# Patient Record
Sex: FEMALE | Race: WHITE | NOT HISPANIC OR LATINO | Employment: OTHER | URBAN - NONMETROPOLITAN AREA
[De-identification: names, ages, dates, MRNs, and addresses within clinical notes are randomized per-mention and may not be internally consistent; named-entity substitution may affect disease eponyms.]

---

## 2017-09-12 ENCOUNTER — OFFICE VISIT (OUTPATIENT)
Dept: GASTROENTEROLOGY | Facility: CLINIC | Age: 67
End: 2017-09-12

## 2017-09-12 VITALS
HEART RATE: 68 BPM | BODY MASS INDEX: 25.27 KG/M2 | SYSTOLIC BLOOD PRESSURE: 122 MMHG | WEIGHT: 148 LBS | DIASTOLIC BLOOD PRESSURE: 80 MMHG | OXYGEN SATURATION: 99 % | HEIGHT: 64 IN

## 2017-09-12 DIAGNOSIS — Z80.0 FAMILY HX OF COLON CANCER: ICD-10-CM

## 2017-09-12 DIAGNOSIS — R13.10 DIFFICULTY SWALLOWING SOLIDS: ICD-10-CM

## 2017-09-12 DIAGNOSIS — Z86.010 HX OF ADENOMATOUS COLONIC POLYPS: Primary | ICD-10-CM

## 2017-09-12 PROBLEM — Z86.0101 HX OF ADENOMATOUS COLONIC POLYPS: Status: ACTIVE | Noted: 2017-09-12

## 2017-09-12 PROCEDURE — 99214 OFFICE O/P EST MOD 30 MIN: CPT | Performed by: CLINICAL NURSE SPECIALIST

## 2017-09-12 RX ORDER — SODIUM, POTASSIUM,MAG SULFATES 17.5-3.13G
SOLUTION, RECONSTITUTED, ORAL ORAL
Qty: 2 BOTTLE | Refills: 0 | Status: SHIPPED | OUTPATIENT
Start: 2017-09-12 | End: 2018-06-20

## 2017-09-12 NOTE — PROGRESS NOTES
Liudmila Ga  1950 9/12/2017  Chief Complaint   Patient presents with   • Colonoscopy     Subjective   HPI  Liudmila Ga is a 66 y.o. female who presents as a referral for preventative maintenance. She has no complaints of nausea or vomiting. No change in bowels. No wt loss. No BRBPR. No melena. There is a negative family hx for colon cancer. No abdominal pain.    She also has recurrent difficulty swallowing It is with any solid foods. It is located in the mid esophageal region. It occurs daily when she eats. She denies any nausea or vomiting. No wt loss. No melena. She has had dilatation in the past in 2015 noted below.   Past Medical History:   Diagnosis Date   • Colon polyps    • GERD (gastroesophageal reflux disease)    • Hiatal hernia    • Osteopenia    • Overactive bladder    • Vitamin D deficiency      Past Surgical History:   Procedure Laterality Date   • CHOLECYSTECTOMY  2004   • COLONOSCOPY  08/20/2014    HYPERPLASTIC POLYP ILEOCECAL VALVE AND HEPATIC FLEXURE, DIVERTICULA IN SIGMOID COLON   • COLONOSCOPY  02/04/2014    TUBULAR ADENOMA CECAL POLYP, TUBULAR ADENOMA RECTAL POLYP, HYPERPLASTICS POLYPS AT ILEOCECAL VALVE AND RECTAL.   • ENDOSCOPY  12/22/2015    HIATAL HERNIA, MILD SCHATZKI RING DILATED   • HYSTERECTOMY  06/2011    TOTAL   • TONSILLECTOMY       Outpatient Prescriptions Marked as Taking for the 9/12/17 encounter (Office Visit) with ALTHEA Valdivia   Medication Sig Dispense Refill   • atenolol (TENORMIN) 25 MG tablet Take 25 mg by mouth Daily.     • DULoxetine (CYMBALTA) 30 MG capsule Take 30 mg by mouth Daily.     • fluticasone (FLONASE) 50 MCG/ACT nasal spray 2 sprays into each nostril Daily.     • zolpidem (AMBIEN) 10 MG tablet Take 10 mg by mouth At Night As Needed for Sleep.       Allergies   Allergen Reactions   • Lortab [Hydrocodone-Acetaminophen]    • Sulfa Antibiotics      Social History     Social History   • Marital status:      Spouse name: N/A   •  "Number of children: N/A   • Years of education: N/A     Occupational History   • Not on file.     Social History Main Topics   • Smoking status: Former Smoker   • Smokeless tobacco: Never Used   • Alcohol use Yes      Comment: RARE   • Drug use: Not on file   • Sexual activity: Not on file     Other Topics Concern   • Not on file     Social History Narrative     Family History   Problem Relation Age of Onset   • Liver cancer Mother    • Colon cancer Brother    • Colon polyps Brother      Health Maintenance   Topic Date Due   • TDAP/TD VACCINES (1 - Tdap) 12/02/1967   • PNEUMOCOCCAL VACCINES (65+ LOW/MEDIUM RISK) (1 of 2 - PCV13) 12/02/2013   • INFLUENZA VACCINE  08/01/2017   • HEPATITIS C SCREENING  08/30/2017   • MEDICARE ANNUAL WELLNESS  08/30/2017   • MAMMOGRAM  08/30/2017   • ZOSTER VACCINE  08/30/2017   • DXA SCAN  09/11/2017   • COLONOSCOPY  08/20/2024       REVIEW OF SYSTEMS  General: well appearing, no fever chills or sweats, no unexplained wt loss  HEENT: no acute visual or hearing disturbances  Cardiovascular: No chest pain or palpitations  Pulmonary: No shortness of breath, coughing, wheezing or hemoptysis  : No burning, urgency, hematuria, or dysuria  Musculoskeletal: No joint pain or stiffness  Peripheral: no edema  Skin: No lesions or rashes  Neuro: No dizziness, headaches, stroke, syncope  Endocrine: No hot or cold intolerances  Hematological: No blood dyscrasias    Objective   Vitals:    09/12/17 1406   BP: 122/80   Pulse: 68   SpO2: 99%   Weight: 148 lb (67.1 kg)   Height: 64\" (162.6 cm)     Body mass index is 25.4 kg/(m^2).    PHYSICAL EXAM  General: age appropriate well nourished well appearing, no acute distress  Head: normocephalic and atraumatic  Global assessment-supple  Neck-No JVD noted, no lymphadenopathy  Pulmonary-clear to auscultation bilaterally, normal respiratory effort  Cardiovascular-normal rate and rhythm, normal heart sounds, S1 and S2 noted  Abdomen-soft, non tender, non " distended, normal bowel sounds all 4 quadrants, no hepatosplenomegaly noted  Extremities-No clubbing cyanosis or edema  Neuro-Non focal, converses appropriately, awake, alert, oriented    Assessment/Plan     Liudmila was seen today for colonoscopy.    Diagnoses and all orders for this visit:    Hx of adenomatous colonic polyps  Comments:  2/2014 as noted in surgical hx.  Orders:  -     SUPREP BOWEL PREP KIT 17.5-3.13-1.6 GM/180ML solution oral solution; Take as directed by office instructions provided  -     Case Request; Standing  -     Implement Anesthesia Orders Day of Procedure; Standing  -     Obtain Informed Consent; Standing  -     Verify bowel prep was successful; Standing  -     Case Request    Difficulty swallowing solids  -     Case Request; Standing  -     Implement Anesthesia Orders Day of Procedure; Standing  -     Obtain Informed Consent; Standing  -     Case Request    Family hx of colon cancer  Comments:  Brother with hx of colon cancer, in his 60s.      SHE SAYS THAT SHE HAS A DIFFICULT COLON SMALL IN SIZE AND TWISTED.     COLONOSCOPY WITH ANESTHESIA (N/A)  Body mass index is 25.4 kg/(m^2).    Patient instructions on prep prior to procedure provided to the patient.    All risks, benefits, alternatives, and indications of colonoscopy procedure have been discussed with the patient. Risks to include perforation of the colon requiring possible surgery or colostomy, risk of bleeding from biopsies or removal of colon tissue, possibility of missing a colon polyp or cancer, or adverse drug reaction.  Benefits to include the diagnosis and management of disease of the colon and rectum. Alternatives to include barium enema, radiographic evaluation, lab testing or no intervention. Pt verbalizes understanding and agrees.

## 2017-11-07 ENCOUNTER — ANESTHESIA (OUTPATIENT)
Dept: GASTROENTEROLOGY | Facility: HOSPITAL | Age: 67
End: 2017-11-07

## 2017-11-07 ENCOUNTER — HOSPITAL ENCOUNTER (OUTPATIENT)
Facility: HOSPITAL | Age: 67
Setting detail: HOSPITAL OUTPATIENT SURGERY
Discharge: HOME | End: 2017-11-07
Attending: INTERNAL MEDICINE | Admitting: INTERNAL MEDICINE

## 2017-11-07 ENCOUNTER — ANESTHESIA EVENT (OUTPATIENT)
Dept: GASTROENTEROLOGY | Facility: HOSPITAL | Age: 67
End: 2017-11-07

## 2017-11-07 VITALS
RESPIRATION RATE: 15 BRPM | TEMPERATURE: 97.7 F | BODY MASS INDEX: 26.29 KG/M2 | OXYGEN SATURATION: 97 % | HEART RATE: 69 BPM | WEIGHT: 154 LBS | SYSTOLIC BLOOD PRESSURE: 112 MMHG | HEIGHT: 64 IN | DIASTOLIC BLOOD PRESSURE: 59 MMHG

## 2017-11-07 DIAGNOSIS — R13.10 DIFFICULTY SWALLOWING SOLIDS: ICD-10-CM

## 2017-11-07 PROCEDURE — 43450 DILATE ESOPHAGUS 1/MULT PASS: CPT | Performed by: INTERNAL MEDICINE

## 2017-11-07 PROCEDURE — 25010000002 PROPOFOL 10 MG/ML EMULSION: Performed by: NURSE ANESTHETIST, CERTIFIED REGISTERED

## 2017-11-07 PROCEDURE — 43235 EGD DIAGNOSTIC BRUSH WASH: CPT | Performed by: INTERNAL MEDICINE

## 2017-11-07 RX ORDER — SODIUM CHLORIDE 9 MG/ML
500 INJECTION, SOLUTION INTRAVENOUS CONTINUOUS PRN
Status: DISCONTINUED | OUTPATIENT
Start: 2017-11-07 | End: 2017-11-07 | Stop reason: HOSPADM

## 2017-11-07 RX ORDER — SODIUM CHLORIDE 0.9 % (FLUSH) 0.9 %
3 SYRINGE (ML) INJECTION AS NEEDED
Status: DISCONTINUED | OUTPATIENT
Start: 2017-11-07 | End: 2017-11-07 | Stop reason: HOSPADM

## 2017-11-07 RX ORDER — LIDOCAINE HYDROCHLORIDE 20 MG/ML
INJECTION, SOLUTION INFILTRATION; PERINEURAL AS NEEDED
Status: DISCONTINUED | OUTPATIENT
Start: 2017-11-07 | End: 2017-11-07 | Stop reason: SURG

## 2017-11-07 RX ORDER — PROPOFOL 10 MG/ML
VIAL (ML) INTRAVENOUS AS NEEDED
Status: DISCONTINUED | OUTPATIENT
Start: 2017-11-07 | End: 2017-11-07 | Stop reason: SURG

## 2017-11-07 RX ADMIN — LIDOCAINE HYDROCHLORIDE 50 MG: 20 INJECTION, SOLUTION INFILTRATION; PERINEURAL at 09:21

## 2017-11-07 RX ADMIN — SODIUM CHLORIDE 500 ML: 9 INJECTION, SOLUTION INTRAVENOUS at 08:21

## 2017-11-07 RX ADMIN — PROPOFOL 80 MG: 10 INJECTION, EMULSION INTRAVENOUS at 09:22

## 2017-11-07 RX ADMIN — PROPOFOL 50 MG: 10 INJECTION, EMULSION INTRAVENOUS at 09:26

## 2017-11-07 NOTE — ANESTHESIA PREPROCEDURE EVALUATION
Anesthesia Evaluation     Patient summary reviewed   no history of anesthetic complications:         Airway   Mallampati: II  TM distance: <3 FB  Dental          Pulmonary    (+) a smoker (quit 01/2012) Former,   (-) COPD, asthma, sleep apnea  Cardiovascular   Exercise tolerance: good (4-7 METS)    Patient on routine beta blocker and Beta blocker given within 24 hours of surgery    (+) dysrhythmias Tachycardia,       Neuro/Psych  (-) seizures, TIA, CVA  GI/Hepatic/Renal/Endo    (+)  hiatal hernia, GERD,   (-) liver disease, no renal disease, diabetes    Musculoskeletal     Abdominal    Substance History      OB/GYN          Other                                        Anesthesia Plan    ASA 2     general   total IV anesthesia  intravenous induction   Anesthetic plan and risks discussed with patient.

## 2017-11-07 NOTE — PLAN OF CARE
Problem: Patient Care Overview (Adult)  Goal: Plan of Care Review  Outcome: Outcome(s) achieved Date Met:  11/07/17 11/07/17 0943   Coping/Psychosocial Response Interventions   Plan Of Care Reviewed With patient;spouse   Patient Care Overview   Progress improving   Outcome Evaluation   Outcome Summary/Follow up Plan discharge criteria met

## 2017-11-07 NOTE — PLAN OF CARE
Problem: Patient Care Overview (Adult)  Goal: Plan of Care Review  Outcome: Ongoing (interventions implemented as appropriate)    11/07/17 0907   Coping/Psychosocial Response Interventions   Plan Of Care Reviewed With patient   Patient Care Overview   Progress no change   Outcome Evaluation   Outcome Summary/Follow up Plan pt tolerated procedure well

## 2017-11-07 NOTE — PLAN OF CARE
Problem: GI Endoscopy (Adult)  Goal: Signs and Symptoms of Listed Potential Problems Will be Absent or Manageable (GI Endoscopy)  Outcome: Outcome(s) achieved Date Met:  11/07/17 11/07/17 0942   GI Endoscopy   Problems Assessed (GI Endoscopy) all   Problems Present (GI Endoscopy) none

## 2017-11-07 NOTE — ANESTHESIA POSTPROCEDURE EVALUATION
Patient: Liudmila Ga    Procedure Summary     Date Anesthesia Start Anesthesia Stop Room / Location    11/07/17 0918 0932 [facility] ENDOSCOPY 4 / [facility]       Procedure Diagnosis Surgeon Provider    ESOPHAGOGASTRODUODENOSCOPY WITH ANESTHESIA (N/A Esophagus) Difficulty swallowing solids  (Difficulty swallowing solids [R13.10]) MD Papo Shane CRNA          Anesthesia Type: general  Last vitals  BP   118/55 (11/07/17 0801)   Temp   97.7 °F (36.5 °C) (11/07/17 0801)   Pulse   72 (11/07/17 0801)   Resp   18 (11/07/17 0801)     SpO2   95 % (11/07/17 0801)     Post Anesthesia Care and Evaluation    Patient participation: complete - patient participated  Level of consciousness: awake  Pain score: 1  Pain management: adequate  Airway patency: patent  Anesthetic complications: No anesthetic complications  PONV Status: none  Cardiovascular status: acceptable  Respiratory status: acceptable  Hydration status: acceptable

## 2017-11-07 NOTE — H&P
"[facility] Gastroenterology  Pre Procedure History & Physical    Chief Complaint:   Dysphagia    Subjective     HPI:   Here for endoscopy.  Having dysphagia to solids and liquids.  Feels like she gets choked at times.  History of hiatal hernia and Schatzki's ring.    Past Medical History:   Past Medical History:   Diagnosis Date   • Arthritis    • Colon polyps    • GERD (gastroesophageal reflux disease)    • Hiatal hernia    • Osteopenia    • Overactive bladder    • Vitamin D deficiency        Past Surgical History:  [unfilled]    Family History:  Family History   Problem Relation Age of Onset   • Liver cancer Mother    • Colon cancer Brother    • Colon polyps Brother        Social History:   reports that she quit smoking about 5 years ago. She has never used smokeless tobacco. She reports that she drinks alcohol. She reports that she does not use illicit drugs.    Medications:   Prior to Admission medications    Medication Sig Start Date End Date Taking? Authorizing Provider   atenolol (TENORMIN) 25 MG tablet Take 25 mg by mouth Daily.   Yes Historical Provider, MD   DULoxetine (CYMBALTA) 30 MG capsule Take 30 mg by mouth Daily.   Yes Historical Provider, MD   fluticasone (FLONASE) 50 MCG/ACT nasal spray 2 sprays into each nostril Daily.   Yes Historical Provider, MD   zolpidem (AMBIEN) 10 MG tablet Take 10 mg by mouth At Night As Needed for Sleep.   Yes Historical Provider, MD   SUPREP BOWEL PREP KIT 17.5-3.13-1.6 GM/180ML solution oral solution Take as directed by office instructions provided 9/12/17   ALTHEA Valdivia       Allergies:  Lortab [hydrocodone-acetaminophen] and Sulfa antibiotics    Objective     Blood pressure 118/55, pulse 72, temperature 97.7 °F (36.5 °C), temperature source Temporal Artery , resp. rate 18, height 64\" (162.6 cm), weight 154 lb (69.9 kg), SpO2 95 %.    Physical Exam   Constitutional: Pt is oriented to person, place, and in no distress.   HENT: Mouth/Throat: Oropharynx is " clear.   Cardiovascular: Normal rate, regular rhythm.    Pulmonary/Chest: Effort normal. No respiratory distress. No  wheezes.   Abdominal: Soft. Non-distended.  Skin: Skin is warm and dry.   Psychiatric: Mood, memory, affect and judgment appear normal.     Assessment/Plan     Diagnosis:    Dysphagia    Anticipated Surgical Procedure:    Proceed with endoscopy as scheduled    The following major R/B/A were discussed with the patient, however the list is not all inclusive . Risk:  Bleeding (immediate and delayed), perforation (rupture or tear), reaction to medication, missed lesion/cancer, pain during the procedure, infection, need for surgery, need for ostomy, need for mechanical ventilation (breathing machine), death.  Benefits: removal of polyp/tissue, burn/clip/or inject to stop bleeding, removal of foreign body, dilate any stricture.  Alternatives: Xray or CT, surgery, do nothing with associated risk   The patient was given time to ask question and received explanation, and agrees to proceed as per History and Physical.   No guarantee given or expressed.    EMR Dragon/transcription disclaimer: Much of this encounter note is an electronic transcription/translation of spoken language to printed text.  The electronic translation of spoken language may permit erroneous, or at times, nonsensical words or phrases to be inadvertently transcribed.  Although I have reviewed the note for such errors, some may still exist.    Stevan Mullins MD  9:21 AM  11/7/2017

## 2017-12-11 ENCOUNTER — ANESTHESIA (OUTPATIENT)
Dept: GASTROENTEROLOGY | Facility: HOSPITAL | Age: 67
End: 2017-12-11

## 2017-12-11 ENCOUNTER — ANESTHESIA EVENT (OUTPATIENT)
Dept: GASTROENTEROLOGY | Facility: HOSPITAL | Age: 67
End: 2017-12-11

## 2017-12-11 ENCOUNTER — HOSPITAL ENCOUNTER (OUTPATIENT)
Facility: HOSPITAL | Age: 67
Setting detail: HOSPITAL OUTPATIENT SURGERY
Discharge: HOME | End: 2017-12-11
Attending: INTERNAL MEDICINE | Admitting: INTERNAL MEDICINE

## 2017-12-11 VITALS
OXYGEN SATURATION: 97 % | HEIGHT: 64 IN | HEART RATE: 63 BPM | TEMPERATURE: 98.2 F | SYSTOLIC BLOOD PRESSURE: 112 MMHG | BODY MASS INDEX: 26.29 KG/M2 | RESPIRATION RATE: 23 BRPM | DIASTOLIC BLOOD PRESSURE: 58 MMHG | WEIGHT: 154 LBS

## 2017-12-11 DIAGNOSIS — Z86.010 HX OF ADENOMATOUS COLONIC POLYPS: ICD-10-CM

## 2017-12-11 PROCEDURE — 88305 TISSUE EXAM BY PATHOLOGIST: CPT | Performed by: INTERNAL MEDICINE

## 2017-12-11 PROCEDURE — 25010000002 PROPOFOL 10 MG/ML EMULSION: Performed by: NURSE ANESTHETIST, CERTIFIED REGISTERED

## 2017-12-11 PROCEDURE — 45385 COLONOSCOPY W/LESION REMOVAL: CPT | Performed by: INTERNAL MEDICINE

## 2017-12-11 RX ORDER — SODIUM CHLORIDE 0.9 % (FLUSH) 0.9 %
3 SYRINGE (ML) INJECTION AS NEEDED
Status: DISCONTINUED | OUTPATIENT
Start: 2017-12-11 | End: 2017-12-11 | Stop reason: HOSPADM

## 2017-12-11 RX ORDER — PROPOFOL 10 MG/ML
VIAL (ML) INTRAVENOUS AS NEEDED
Status: DISCONTINUED | OUTPATIENT
Start: 2017-12-11 | End: 2017-12-11 | Stop reason: SURG

## 2017-12-11 RX ORDER — SODIUM CHLORIDE 9 MG/ML
500 INJECTION, SOLUTION INTRAVENOUS CONTINUOUS PRN
Status: DISCONTINUED | OUTPATIENT
Start: 2017-12-11 | End: 2017-12-11 | Stop reason: HOSPADM

## 2017-12-11 RX ADMIN — PROPOFOL 50 MG: 10 INJECTION, EMULSION INTRAVENOUS at 10:46

## 2017-12-11 RX ADMIN — PROPOFOL 50 MG: 10 INJECTION, EMULSION INTRAVENOUS at 10:55

## 2017-12-11 RX ADMIN — PROPOFOL 50 MG: 10 INJECTION, EMULSION INTRAVENOUS at 10:47

## 2017-12-11 RX ADMIN — PROPOFOL 50 MG: 10 INJECTION, EMULSION INTRAVENOUS at 10:45

## 2017-12-11 RX ADMIN — SODIUM CHLORIDE 500 ML: 9 INJECTION, SOLUTION INTRAVENOUS at 10:34

## 2017-12-11 RX ADMIN — PROPOFOL 50 MG: 10 INJECTION, EMULSION INTRAVENOUS at 11:03

## 2017-12-11 RX ADMIN — PROPOFOL 50 MG: 10 INJECTION, EMULSION INTRAVENOUS at 11:05

## 2017-12-11 RX ADMIN — LIDOCAINE HYDROCHLORIDE 0.5 ML: 10 INJECTION, SOLUTION EPIDURAL; INFILTRATION; INTRACAUDAL; PERINEURAL at 10:35

## 2017-12-11 NOTE — PLAN OF CARE
Problem: Patient Care Overview (Adult)  Goal: Plan of Care Review  Outcome: Ongoing (interventions implemented as appropriate)    12/11/17 1100   Coping/Psychosocial Response Interventions   Plan Of Care Reviewed With patient   Patient Care Overview   Progress no change   Outcome Evaluation   Outcome Summary/Follow up Plan tolerating well         Problem: GI Endoscopy (Adult)  Goal: Signs and Symptoms of Listed Potential Problems Will be Absent or Manageable (GI Endoscopy)  Outcome: Ongoing (interventions implemented as appropriate)

## 2017-12-11 NOTE — H&P
"[facility] Gastroenterology  Pre Procedure History & Physical    Chief Complaint:   History of polyps    Subjective     HPI:   Here for colonoscopy.  History of adenomatous polyps.  See office note dated 9/12/2017    Past Medical History:   Past Medical History:   Diagnosis Date   • Arthritis    • Colon polyps    • GERD (gastroesophageal reflux disease)    • Hiatal hernia    • Osteopenia    • Overactive bladder    • Vitamin D deficiency        Past Surgical History:  [unfilled]    Family History:  Family History   Problem Relation Age of Onset   • Liver cancer Mother    • Colon cancer Brother    • Colon polyps Brother        Social History:   reports that she quit smoking about 5 years ago. She has never used smokeless tobacco. She reports that she drinks alcohol. She reports that she does not use illicit drugs.    Medications:   Prior to Admission medications    Medication Sig Start Date End Date Taking? Authorizing Provider   atenolol (TENORMIN) 25 MG tablet Take 25 mg by mouth Daily.   Yes Historical Provider, MD   DULoxetine (CYMBALTA) 30 MG capsule Take 30 mg by mouth Daily.   Yes Historical Provider, MD   fluticasone (FLONASE) 50 MCG/ACT nasal spray 2 sprays into each nostril Daily.   Yes Historical Provider, MD   SUPREP BOWEL PREP KIT 17.5-3.13-1.6 GM/180ML solution oral solution Take as directed by office instructions provided 9/12/17  Yes ALTHEA Valdivia   zolpidem (AMBIEN) 10 MG tablet Take 10 mg by mouth At Night As Needed for Sleep.   Yes Historical Provider, MD       Allergies:  Lortab [hydrocodone-acetaminophen] and Sulfa antibiotics    Objective     Blood pressure 126/67, pulse 69, temperature 98.2 °F (36.8 °C), temperature source Temporal Artery , resp. rate 20, height 162.6 cm (64\"), weight 69.9 kg (154 lb), SpO2 98 %.    Physical Exam   Constitutional: Pt is oriented to person, place, and in no distress.   HENT: Mouth/Throat: Oropharynx is clear.   Cardiovascular: Normal rate, regular " rhythm.    Pulmonary/Chest: Effort normal. No respiratory distress. No  wheezes.   Abdominal: Soft. Non-distended.  Skin: Skin is warm and dry.   Psychiatric: Mood, memory, affect and judgment appear normal.     Assessment/Plan     Diagnosis:  History of polyps    Anticipated Surgical Procedure:    Proceed with colonoscopy as scheduled    The following major R/B/A were discussed with the patient, however the list is not all inclusive . Risk:  Bleeding (immediate and delayed), perforation (rupture or tear), reaction to medication, missed lesion/cancer, pain during the procedure, infection, need for surgery, need for ostomy, need for mechanical ventilation (breathing machine), death.  Benefits: removal of polyp/tissue, burn/clip/or inject to stop bleeding, removal of foreign body, dilate any stricture.  Alternatives: Xray or CT, surgery, do nothing with associated risk   The patient was given time to ask question and received explanation, and agrees to proceed as per History and Physical.   No guarantee given or expressed.    EMR Dragon/transcription disclaimer: Much of this encounter note is an electronic transcription/translation of spoken language to printed text.  The electronic translation of spoken language may permit erroneous, or at times, nonsensical words or phrases to be inadvertently transcribed.  Although I have reviewed the note for such errors, some may still exist.    Stevan Mullins MD  10:41 AM  12/11/2017

## 2017-12-11 NOTE — PLAN OF CARE
Problem: Patient Care Overview (Adult)  Goal: Plan of Care Review  Outcome: Outcome(s) achieved Date Met:  12/11/17 12/11/17 1110   Coping/Psychosocial Response Interventions   Plan Of Care Reviewed With patient;spouse   Patient Care Overview   Progress improving   Outcome Evaluation   Outcome Summary/Follow up Plan discharge criteria met

## 2017-12-11 NOTE — PLAN OF CARE
Problem: GI Endoscopy (Adult)  Goal: Signs and Symptoms of Listed Potential Problems Will be Absent or Manageable (GI Endoscopy)  Outcome: Outcome(s) achieved Date Met:  12/11/17 12/11/17 1110   GI Endoscopy   Problems Assessed (GI Endoscopy) all   Problems Present (GI Endoscopy) none

## 2017-12-11 NOTE — ANESTHESIA POSTPROCEDURE EVALUATION
Patient: Liudmila Ga    Procedure Summary     Date Anesthesia Start Anesthesia Stop Room / Location    12/11/17 1044 1109  [facility] /  [facility]       Procedure Diagnosis Surgeon Provider    COLONOSCOPY WITH ANESTHESIA (N/A ) Hx of adenomatous colonic polyps  (Hx of adenomatous colonic polyps [Z86.010]) MD Ian Shane CRNA          Anesthesia Type: general  Last vitals  BP   112/58 (12/11/17 1120)   Temp   98.2 °F (36.8 °C) (12/11/17 1008)   Pulse   63 (12/11/17 1120)   Resp   23 (12/11/17 1120)     SpO2   97 % (12/11/17 1120)     Post Anesthesia Care and Evaluation    Patient location during evaluation: PHASE II  Patient participation: complete - patient participated  Level of consciousness: awake and sleepy but conscious  Pain score: 0  Pain management: adequate  Airway patency: patent  Anesthetic complications: No anesthetic complications    Cardiovascular status: acceptable  Respiratory status: acceptable  Hydration status: acceptable

## 2017-12-16 LAB
CYTO UR: NORMAL
LAB AP CASE REPORT: NORMAL
LAB AP CLINICAL INFORMATION: NORMAL
Lab: NORMAL
PATH REPORT.FINAL DX SPEC: NORMAL
PATH REPORT.GROSS SPEC: NORMAL

## 2018-06-20 ENCOUNTER — OFFICE VISIT (OUTPATIENT)
Dept: OBSTETRICS AND GYNECOLOGY | Facility: CLINIC | Age: 68
End: 2018-06-20

## 2018-06-20 VITALS
WEIGHT: 144 LBS | SYSTOLIC BLOOD PRESSURE: 128 MMHG | BODY MASS INDEX: 24.59 KG/M2 | DIASTOLIC BLOOD PRESSURE: 74 MMHG | HEIGHT: 64 IN

## 2018-06-20 DIAGNOSIS — Z46.89 FITTING AND ADJUSTMENT OF PESSARY: ICD-10-CM

## 2018-06-20 DIAGNOSIS — N81.6 RECTOCELE: Primary | ICD-10-CM

## 2018-06-20 DIAGNOSIS — N81.10 FEMALE CYSTOCELE: ICD-10-CM

## 2018-06-20 PROCEDURE — 57160 INSERT PESSARY/OTHER DEVICE: CPT | Performed by: OBSTETRICS & GYNECOLOGY

## 2018-06-20 PROCEDURE — A4562 PESSARY, NON RUBBER,ANY TYPE: HCPCS | Performed by: OBSTETRICS & GYNECOLOGY

## 2018-06-20 PROCEDURE — 99202 OFFICE O/P NEW SF 15 MIN: CPT | Performed by: OBSTETRICS & GYNECOLOGY

## 2018-06-20 RX ORDER — GLUCOSAMINE/D3/BOSWELLIA SERRA 1500MG-400
TABLET ORAL
COMMUNITY

## 2018-06-20 RX ORDER — AMOXICILLIN 500 MG
1200 CAPSULE ORAL DAILY
COMMUNITY

## 2018-06-20 RX ORDER — BISACODYL 5 MG/1
5 TABLET, DELAYED RELEASE ORAL DAILY PRN
COMMUNITY

## 2018-06-20 RX ORDER — LEVOTHYROXINE SODIUM 25 MCG
TABLET ORAL
COMMUNITY
Start: 2018-05-30 | End: 2022-08-23

## 2018-06-20 RX ORDER — DICLOFENAC SODIUM 75 MG/1
75 TABLET, DELAYED RELEASE ORAL 2 TIMES DAILY
COMMUNITY
Start: 2018-05-31

## 2018-06-20 RX ORDER — CHOLECALCIFEROL (VITAMIN D3) 125 MCG
CAPSULE ORAL
COMMUNITY

## 2018-06-20 NOTE — PROGRESS NOTES
Subjective   Chief Complaint   Patient presents with   • Prolapse     New pt. Referral for prolapse. Pt is s/p hyst and previous bladder sling surgery.      Liudmila Ga is a 67 y.o. year old who presents to be seen for pelvic prolapse.      [unfilled]    The patient is s/p hysterectomy and bladder sling for stress incontinence in June 2011.  She had satisfactory results after surgery and reports that everything was good until just recently.  She reports positive vaginal pressure and bulge outside her body, but denies urinary incontinence, urinary hesitancy and stool trapping.  The patient feels like the problem began several months ago.  She is not currently sexually active.  She has used a pessary in the past, but says that her incontinence was worse with the pessary in place.      Past Medical History:   Diagnosis Date   • Arthritis    • Colon polyps    • GERD (gastroesophageal reflux disease)    • Hiatal hernia    • Osteopenia    • Overactive bladder    • Vitamin D deficiency        Current Outpatient Prescriptions:   •  atenolol (TENORMIN) 25 MG tablet, Take 25 mg by mouth Daily., Disp: , Rfl:   •  Biotin 74859 MCG tablet, Take  by mouth., Disp: , Rfl:   •  bisacodyl (DULCOLAX) 5 MG EC tablet, Take 5 mg by mouth Daily As Needed for Constipation., Disp: , Rfl:   •  Cholecalciferol (VITAMIN D3) 2000 units tablet, Take  by mouth., Disp: , Rfl:   •  diclofenac (VOLTAREN) 75 MG EC tablet, , Disp: , Rfl:   •  DULoxetine (CYMBALTA) 30 MG capsule, Take 30 mg by mouth Daily., Disp: , Rfl:   •  fluticasone (FLONASE) 50 MCG/ACT nasal spray, 2 sprays into each nostril Daily., Disp: , Rfl:   •  Omega-3 Fatty Acids (FISH OIL) 1200 MG capsule capsule, Take 1,200 mg by mouth Daily., Disp: , Rfl:   •  SYNTHROID 25 MCG tablet, , Disp: , Rfl:   •  Zinc 50 MG capsule, Take  by mouth., Disp: , Rfl:   •  zolpidem (AMBIEN) 10 MG tablet, Take 10 mg by mouth At Night As Needed for Sleep., Disp: , Rfl:   Family History   Problem  "Relation Age of Onset   • Liver cancer Mother    • Colon cancer Brother    • Colon polyps Brother      Social History     Social History   • Marital status:      Social History Main Topics   • Smoking status: Former Smoker     Quit date: 1/7/2012   • Smokeless tobacco: Never Used   • Alcohol use Yes      Comment: RARE   • Drug use: No   • Sexual activity: No     Other Topics Concern   • Not on file     Allergies   Allergen Reactions   • Lortab [Hydrocodone-Acetaminophen] Itching   • Sulfa Antibiotics Unknown (See Comments)     unknown       Family History   Problem Relation Age of Onset   • Liver cancer Mother    • Colon cancer Brother    • Colon polyps Brother      Review of Systems   Constitutional: Negative for activity change and unexpected weight change.   Respiratory: Negative for shortness of breath.    Cardiovascular: Negative for chest pain.   Gastrointestinal: Positive for constipation (well-managed with stool softeners every day). Negative for abdominal pain and diarrhea.   Genitourinary: Positive for vaginal pain (\"pressure\" \"it feels like a tampon is trying to fall out\"). Negative for difficulty urinating, enuresis (none since sling in 2011), vaginal bleeding and vaginal discharge.           Objective   /74 (BP Location: Right arm, Patient Position: Sitting)   Ht 162.6 cm (64\")   Wt 65.3 kg (144 lb)   BMI 24.72 kg/m²     Physical Exam   Constitutional: She is oriented to person, place, and time. She appears well-developed and well-nourished. No distress.   HENT:   Head: Normocephalic and atraumatic.   Eyes: EOM are normal.   Neck: Normal range of motion.   Pulmonary/Chest: Effort normal.   Abdominal: Soft. She exhibits no distension. There is no tenderness.   Genitourinary:   Genitourinary Comments: S/p hysterectomy.  Vaginal mucosa and cuff unremarkable.  Grade II cystocele, grade III rectocele.  2-3/4\" donut pessary placed.  Patient reported to be comfortable, even with different types " "of movement.   Musculoskeletal: Normal range of motion.   Neurological: She is alert and oriented to person, place, and time.   Skin: Skin is warm and dry.   Psychiatric: She has a normal mood and affect. Her behavior is normal. Judgment normal.   Nursing note and vitals reviewed.      Imaging   No data reviewed       Assessment & Plan    Liudmila was seen today for prolapse.    Diagnoses and all orders for this visit:    Rectocele: Pt fitted with a 2-3/4\" donut pessary.  She reports it to be comfortable.  She will RTO in one week, or sooner if she has problems.  Patient wants to avoid surgery; she is not interested in any discussion of AR/SC at this time.    Female cystocele    Fitting and adjustment of pessary          Chloe Waggoner MD  6/20/2018  3:06 PM  "

## 2018-06-21 ENCOUNTER — TELEPHONE (OUTPATIENT)
Dept: OBSTETRICS AND GYNECOLOGY | Facility: CLINIC | Age: 68
End: 2018-06-21

## 2018-06-28 ENCOUNTER — OFFICE VISIT (OUTPATIENT)
Dept: OBSTETRICS AND GYNECOLOGY | Facility: CLINIC | Age: 68
End: 2018-06-28

## 2018-06-28 VITALS
WEIGHT: 145 LBS | SYSTOLIC BLOOD PRESSURE: 116 MMHG | HEIGHT: 64 IN | DIASTOLIC BLOOD PRESSURE: 80 MMHG | BODY MASS INDEX: 24.75 KG/M2

## 2018-06-28 DIAGNOSIS — N81.6 RECTOCELE: Primary | ICD-10-CM

## 2018-06-28 DIAGNOSIS — N81.10 FEMALE CYSTOCELE: ICD-10-CM

## 2018-06-28 DIAGNOSIS — Z46.89 FITTING AND ADJUSTMENT OF PESSARY: ICD-10-CM

## 2018-06-28 PROCEDURE — 99213 OFFICE O/P EST LOW 20 MIN: CPT | Performed by: OBSTETRICS & GYNECOLOGY

## 2018-06-28 NOTE — PROGRESS NOTES
"Subjective   Chief Complaint   Patient presents with   • Pessary Check     pt here today for pessary check. pt says that around 5:30pm, the day her pessary was inserted, it fell out when she went to the bathroom. pt voices no other concerns.     Liudmila Ga is a 67 y.o. year old .  No LMP recorded. Patient has had a hysterectomy.  She presents to be seen because new pessary placed several days ago fell out.  She returns to try a different one.     The following portions of the patient's history were reviewed and updated as appropriate:current medications and allergies    Smoking status: Former Smoker                                                              Packs/day: 0.00      Years: 0.00         Quit date: 2012  Smokeless tobacco: Never Used                        Review of Systems   Constitutional: Negative for activity change and unexpected weight change.   Respiratory: Negative for shortness of breath.    Cardiovascular: Negative for chest pain.   Gastrointestinal: Positive for constipation (well-managed with stool softeners every day). Negative for abdominal pain and diarrhea.   Genitourinary: Positive for vaginal pain (\"pressure\" \"it feels like a tampon is trying to fall out\"). Negative for difficulty urinating, enuresis (none since sling in ), vaginal bleeding and vaginal discharge.         Objective   /80   Ht 162.6 cm (64\")   Wt 65.8 kg (145 lb)   BMI 24.89 kg/m²     Physical Exam   Constitutional: She is oriented to person, place, and time. She appears well-developed and well-nourished. No distress.   HENT:   Head: Normocephalic and atraumatic.   Eyes: EOM are normal.   Neck: Normal range of motion.   Pulmonary/Chest: Effort normal.   Abdominal: Soft. She exhibits no distension. There is no tenderness.   Genitourinary:   Genitourinary Comments: S/p hysterectomy.  Vaginal mucosa and cuff unremarkable.  Grade II cystocele, grade III rectocele.  2-3/4\" donut pessary placed at " "last visit fell out that evening, so today the patient was fitted with a 3 1/4\" donut.  Patient reported to be comfortable, even with different types of movement.   Musculoskeletal: Normal range of motion.   Neurological: She is alert and oriented to person, place, and time.   Skin: Skin is warm and dry.   Psychiatric: She has a normal mood and affect. Her behavior is normal. Judgment normal.   Nursing note and vitals reviewed.      Lab Review   No data reviewed    Imaging   No data reviewed     Assessment & Plan    Liudmila was seen today for pessary check.    Diagnoses and all orders for this visit:    Rectocele: Fitted for new pessary today - 3 1/4\" donut.  RTO in two weeks.    Female cystocele    Fitting and adjustment of pessary      This note was electronically signed.    Chloe Waggoner MD  June 28, 2018  9:50 AM    Total time spent today with Liudmila  was 20 minutes (level 3).  Greater than 50% of the time was spent coordinating care, fitting for a new size pessary.      "

## 2018-07-06 ENCOUNTER — APPOINTMENT (OUTPATIENT)
Dept: LAB | Facility: HOSPITAL | Age: 68
End: 2018-07-06
Attending: INTERNAL MEDICINE

## 2018-07-06 ENCOUNTER — TRANSCRIBE ORDERS (OUTPATIENT)
Dept: ADMINISTRATIVE | Facility: HOSPITAL | Age: 68
End: 2018-07-06

## 2018-07-06 DIAGNOSIS — E03.9 HYPOTHYROIDISM, UNSPECIFIED TYPE: Primary | ICD-10-CM

## 2018-07-06 LAB — TSH SERPL DL<=0.05 MIU/L-ACNC: 0.17 MIU/ML (ref 0.47–4.68)

## 2018-07-06 PROCEDURE — 84443 ASSAY THYROID STIM HORMONE: CPT | Performed by: INTERNAL MEDICINE

## 2018-07-06 PROCEDURE — 36415 COLL VENOUS BLD VENIPUNCTURE: CPT

## 2018-07-13 ENCOUNTER — TRANSCRIBE ORDERS (OUTPATIENT)
Dept: LAB | Facility: HOSPITAL | Age: 68
End: 2018-07-13

## 2018-07-13 ENCOUNTER — OFFICE VISIT (OUTPATIENT)
Dept: OBSTETRICS AND GYNECOLOGY | Facility: CLINIC | Age: 68
End: 2018-07-13

## 2018-07-13 ENCOUNTER — APPOINTMENT (OUTPATIENT)
Dept: LAB | Facility: HOSPITAL | Age: 68
End: 2018-07-13
Attending: INTERNAL MEDICINE

## 2018-07-13 VITALS
HEIGHT: 64 IN | WEIGHT: 156 LBS | DIASTOLIC BLOOD PRESSURE: 58 MMHG | BODY MASS INDEX: 26.63 KG/M2 | SYSTOLIC BLOOD PRESSURE: 114 MMHG

## 2018-07-13 DIAGNOSIS — N30.00 ACUTE CYSTITIS WITHOUT HEMATURIA: Primary | ICD-10-CM

## 2018-07-13 DIAGNOSIS — N95.1 MENOPAUSAL STATE: Primary | ICD-10-CM

## 2018-07-13 DIAGNOSIS — N81.6 RECTOCELE: ICD-10-CM

## 2018-07-13 DIAGNOSIS — N81.10 FEMALE CYSTOCELE: ICD-10-CM

## 2018-07-13 DIAGNOSIS — E03.9 HYPOTHYROIDISM, UNSPECIFIED TYPE: ICD-10-CM

## 2018-07-13 DIAGNOSIS — R53.83 OTHER FATIGUE: ICD-10-CM

## 2018-07-13 LAB
ESTRADIOL SERPL HS-MCNC: 2947.9 PG/ML
PROGEST SERPL-MCNC: 29.3 NG/ML
T4 FREE SERPL-MCNC: 1.18 NG/DL (ref 0.78–2.19)
TSH SERPL DL<=0.05 MIU/L-ACNC: 0.18 MIU/ML (ref 0.47–4.68)

## 2018-07-13 PROCEDURE — 82679 ASSAY OF ESTRONE: CPT | Performed by: INTERNAL MEDICINE

## 2018-07-13 PROCEDURE — 99213 OFFICE O/P EST LOW 20 MIN: CPT | Performed by: OBSTETRICS & GYNECOLOGY

## 2018-07-13 PROCEDURE — 84439 ASSAY OF FREE THYROXINE: CPT | Performed by: INTERNAL MEDICINE

## 2018-07-13 PROCEDURE — 84443 ASSAY THYROID STIM HORMONE: CPT | Performed by: INTERNAL MEDICINE

## 2018-07-13 PROCEDURE — 82670 ASSAY OF TOTAL ESTRADIOL: CPT | Performed by: INTERNAL MEDICINE

## 2018-07-13 PROCEDURE — 84144 ASSAY OF PROGESTERONE: CPT | Performed by: INTERNAL MEDICINE

## 2018-07-13 PROCEDURE — 84403 ASSAY OF TOTAL TESTOSTERONE: CPT | Performed by: INTERNAL MEDICINE

## 2018-07-13 PROCEDURE — 36415 COLL VENOUS BLD VENIPUNCTURE: CPT

## 2018-07-13 NOTE — PROGRESS NOTES
"Subjective   Chief Complaint   Patient presents with   • Pessary Check     pt here today for pessary check. pt says that she feels like her pessary is sliding down. pt also says that she has felt pain with her pessary the past couple of days. pt voices no other concerns.      Liudmila Ga is a 67 y.o. year old .  No LMP recorded. Patient has had a hysterectomy.  She presents to be seen for two week follow-up of a new pessary.  This one was the second attempt (first one fell out shortly after placed).  She reports that until two days ago this pessary was feeling fine, with occasional adjustments in position by herself.  Two days ago she started feeling more pressure in area of her bladder, and a slight tingling sensation that may represent a UTI.     The following portions of the patient's history were reviewed and updated as appropriate:current medications and allergies    Smoking status: Former Smoker                                                              Packs/day: 0.00      Years: 0.00         Quit date: 2012  Smokeless tobacco: Never Used                        Review of Systems   Constitutional: Negative for activity change and unexpected weight change.   Respiratory: Negative for shortness of breath.    Cardiovascular: Negative for chest pain.   Gastrointestinal: Positive for constipation (well-managed with stool softeners every day). Negative for abdominal pain and diarrhea.   Genitourinary: Positive for dysuria (mild tingling) and vaginal pain (\"pressure\".  Pt reports that this pessary felt fine for first 10-12 days, but only started having pressure two days ago). Negative for difficulty urinating, enuresis (none since sling in ), vaginal bleeding and vaginal discharge.         Objective   /58   Ht 162.6 cm (64\")   Wt 70.8 kg (156 lb)   BMI 26.78 kg/m²     Physical Exam   Constitutional: She is oriented to person, place, and time. She appears well-developed and well-nourished. " No distress.   HENT:   Head: Normocephalic and atraumatic.   Eyes: EOM are normal.   Neck: Normal range of motion.   Pulmonary/Chest: Effort normal.   Abdominal: Soft. She exhibits no distension. There is no tenderness.   Musculoskeletal: Normal range of motion.   Neurological: She is alert and oriented to person, place, and time.   Skin: Skin is warm and dry.   Psychiatric: She has a normal mood and affect. Her behavior is normal. Judgment normal.   Nursing note and vitals reviewed.      Lab Review   No data reviewed    Imaging   No data reviewed     Assessment & Plan    Liudmila was seen today for pessary check.    Diagnoses and all orders for this visit:    Acute cystitis without hematuria: Patient has no new pessary placed 2 weeks ago.  She reports that her current pessary was comfortable for the first 10-12 days, but that she started having pressure in the region of her bladder 2 days ago.  The patient also reports mild tingling or burning when she urinates.  We discussed the fact that her symptoms may be coming from her pessary more from a new UTI, but it is more likely infectious since her pessary was comfortable for more than a week.  Urinalysis is being sent today, and the fascial having new appointment made for next Tuesday in case she needs to return and try a different size pessary.  -     Urinalysis With Microscopic If Indicated (No Culture) - Urine, Clean Catch; Future  -     Urine Culture - Urine, Urine, Clean Catch    Female cystocele    Rectocele    This note was electronically signed.    Chloe Waggoner MD  July 13, 2018  10:41 AM    Total time spent today with Liudmila  was 15 minutes.  Greater than 50% of the time was spent coordinating care, answering her questions and counseling regarding pathophysiology of her presenting problem along with plans for any diagnositc work-up and treatment.

## 2018-07-17 ENCOUNTER — OFFICE VISIT (OUTPATIENT)
Dept: OBSTETRICS AND GYNECOLOGY | Facility: CLINIC | Age: 68
End: 2018-07-17

## 2018-07-17 VITALS
HEIGHT: 64 IN | BODY MASS INDEX: 24.92 KG/M2 | SYSTOLIC BLOOD PRESSURE: 142 MMHG | WEIGHT: 146 LBS | DIASTOLIC BLOOD PRESSURE: 70 MMHG

## 2018-07-17 DIAGNOSIS — N30.00 ACUTE CYSTITIS WITHOUT HEMATURIA: Primary | ICD-10-CM

## 2018-07-17 LAB — ESTRONE SERPL-MCNC: 100 PG/ML

## 2018-07-17 PROCEDURE — 99213 OFFICE O/P EST LOW 20 MIN: CPT | Performed by: NURSE PRACTITIONER

## 2018-07-17 RX ORDER — NITROFURANTOIN 25; 75 MG/1; MG/1
100 CAPSULE ORAL 2 TIMES DAILY
Qty: 14 CAPSULE | Refills: 0 | Status: SHIPPED | OUTPATIENT
Start: 2018-07-17 | End: 2018-08-02

## 2018-07-17 RX ORDER — PHENAZOPYRIDINE HYDROCHLORIDE 200 MG/1
200 TABLET, FILM COATED ORAL 3 TIMES DAILY
Qty: 9 TABLET | Refills: 1 | Status: SHIPPED | OUTPATIENT
Start: 2018-07-17 | End: 2018-07-20

## 2018-07-18 LAB
BACTERIA UR CULT: ABNORMAL
BACTERIA UR CULT: ABNORMAL
OTHER ANTIBIOTIC SUSC ISLT: ABNORMAL

## 2018-07-19 ENCOUNTER — DOCUMENTATION (OUTPATIENT)
Dept: OBSTETRICS AND GYNECOLOGY | Facility: CLINIC | Age: 68
End: 2018-07-19

## 2018-07-20 LAB — TESTOST SERPL-MCNC: 8.8 NG/DL (ref 7–40)

## 2018-07-20 NOTE — PROGRESS NOTES
Liudmila Ga is a 67 y.o.      Chief Complaint   Patient presents with   • Pessary Check     pt here today for pessary check. pessary is out because pt says that she felt like she has a bladder infection. Urine results from last week still pending. pt voices no other concerns.            HPI - Liudmila has dysuria and frequency.  She is wearing a pessary.  Her urine culture shows E-coli but the sensitivity is not in yet.  She is not running a fever.      The following portions of the patient's history were reviewed and updated as appropriate:vital signs, allergies, current medications, past family history, past medical history, past social history, past surgical history and problem list.      Current Outpatient Prescriptions:   •  atenolol (TENORMIN) 25 MG tablet, Take 25 mg by mouth Daily., Disp: , Rfl:   •  Biotin 55496 MCG tablet, Take  by mouth., Disp: , Rfl:   •  bisacodyl (DULCOLAX) 5 MG EC tablet, Take 5 mg by mouth Daily As Needed for Constipation., Disp: , Rfl:   •  Cholecalciferol (VITAMIN D3) 2000 units tablet, Take  by mouth., Disp: , Rfl:   •  diclofenac (VOLTAREN) 75 MG EC tablet, , Disp: , Rfl:   •  DULoxetine (CYMBALTA) 30 MG capsule, Take 30 mg by mouth Daily., Disp: , Rfl:   •  fluticasone (FLONASE) 50 MCG/ACT nasal spray, 2 sprays into each nostril Daily., Disp: , Rfl:   •  Omega-3 Fatty Acids (FISH OIL) 1200 MG capsule capsule, Take 1,200 mg by mouth Daily., Disp: , Rfl:   •  SYNTHROID 25 MCG tablet, , Disp: , Rfl:   •  Zinc 50 MG capsule, Take  by mouth., Disp: , Rfl:   •  zolpidem (AMBIEN) 10 MG tablet, Take 10 mg by mouth At Night As Needed for Sleep., Disp: , Rfl:   •  nitrofurantoin, macrocrystal-monohydrate, (MACROBID) 100 MG capsule, Take 1 capsule by mouth 2 (Two) Times a Day., Disp: 14 capsule, Rfl: 0  •  phenazopyridine (PYRIDIUM) 200 MG tablet, Take 1 tablet by mouth 3 (Three) Times a Day for 3 days., Disp: 9 tablet, Rfl: 1    Review of Systems   Constitutional: Negative  "for activity change and unexpected weight change.   Respiratory: Negative for shortness of breath.    Cardiovascular: Negative for chest pain.   Gastrointestinal: Positive for constipation (well-managed with stool softeners every day). Negative for abdominal pain and diarrhea.   Genitourinary: Positive for dysuria (burning), frequency and urgency. Negative for dyspareunia, enuresis (none since sling in 2011), vaginal bleeding, vaginal discharge and vaginal pain.     Breast ROS: negative    Objective      /70   Ht 162.6 cm (64\")   Wt 66.2 kg (146 lb)   BMI 25.06 kg/m²       Physical Exam   Constitutional: She is oriented to person, place, and time. She appears well-developed and well-nourished.   HENT:   Head: Normocephalic and atraumatic.   Eyes: Right eye exhibits no discharge. Left eye exhibits no discharge.   Pulmonary/Chest: Effort normal.   Neurological: She is alert and oriented to person, place, and time.   Psychiatric: She has a normal mood and affect. Her behavior is normal.   Nursing note and vitals reviewed.       Assessment/Plan     ASSESSMENT/PLAN    Liudmila was seen today for pessary check.    Diagnoses and all orders for this visit:    Acute cystitis without hematuria  -     nitrofurantoin, macrocrystal-monohydrate, (MACROBID) 100 MG capsule; Take 1 capsule by mouth 2 (Two) Times a Day.  -     phenazopyridine (PYRIDIUM) 200 MG tablet; Take 1 tablet by mouth 3 (Three) Times a Day for 3 days.      Ua culture pending and patient will be notified if I need to change her antibiotic based on final ua culture and sensitivity.  She is to drink plenty of water and report elevated temp, n/v and/or flank pain.        ALTHEA Moore     Sensitivity reviewed   [facility]    7/20/2018           "

## 2018-07-31 ENCOUNTER — OFFICE VISIT (OUTPATIENT)
Dept: OBSTETRICS AND GYNECOLOGY | Facility: CLINIC | Age: 68
End: 2018-07-31

## 2018-07-31 VITALS
WEIGHT: 149 LBS | HEIGHT: 64 IN | SYSTOLIC BLOOD PRESSURE: 124 MMHG | DIASTOLIC BLOOD PRESSURE: 68 MMHG | BODY MASS INDEX: 25.44 KG/M2

## 2018-07-31 DIAGNOSIS — E28.0 ESTRADIOL EXCESS: Primary | ICD-10-CM

## 2018-07-31 DIAGNOSIS — Z46.89 FITTING AND ADJUSTMENT OF PESSARY: ICD-10-CM

## 2018-07-31 DIAGNOSIS — N81.10 FEMALE CYSTOCELE: ICD-10-CM

## 2018-07-31 DIAGNOSIS — N81.6 RECTOCELE: ICD-10-CM

## 2018-07-31 LAB
ALBUMIN SERPL-MCNC: 4.4 G/DL (ref 3.5–5)
ALBUMIN/GLOB SERPL: 1.6 G/DL (ref 1.1–2.5)
ALP SERPL-CCNC: 76 U/L (ref 24–120)
ALT SERPL-CCNC: 74 U/L (ref 0–54)
AST SERPL-CCNC: 48 U/L (ref 7–45)
BILIRUB SERPL-MCNC: 0.3 MG/DL (ref 0.1–1)
BUN SERPL-MCNC: 27 MG/DL (ref 5–21)
BUN/CREAT SERPL: 27.3 (ref 7–25)
CALCIUM SERPL-MCNC: 10.3 MG/DL (ref 8.4–10.4)
CHLORIDE SERPL-SCNC: 102 MMOL/L (ref 98–110)
CO2 SERPL-SCNC: 27 MMOL/L (ref 24–31)
CREAT SERPL-MCNC: 0.99 MG/DL (ref 0.5–1.4)
GLOBULIN SER CALC-MCNC: 2.8 GM/DL
GLUCOSE SERPL-MCNC: 94 MG/DL (ref 70–100)
POTASSIUM SERPL-SCNC: 5.2 MMOL/L (ref 3.5–5.3)
PROT SERPL-MCNC: 7.2 G/DL (ref 6.3–8.7)
SODIUM SERPL-SCNC: 141 MMOL/L (ref 135–145)

## 2018-07-31 PROCEDURE — 57160 INSERT PESSARY/OTHER DEVICE: CPT | Performed by: OBSTETRICS & GYNECOLOGY

## 2018-08-02 ENCOUNTER — TELEPHONE (OUTPATIENT)
Dept: OBSTETRICS AND GYNECOLOGY | Facility: CLINIC | Age: 68
End: 2018-08-02

## 2018-08-02 ENCOUNTER — OFFICE VISIT (OUTPATIENT)
Dept: OBSTETRICS AND GYNECOLOGY | Facility: CLINIC | Age: 68
End: 2018-08-02

## 2018-08-02 VITALS
SYSTOLIC BLOOD PRESSURE: 130 MMHG | WEIGHT: 148 LBS | DIASTOLIC BLOOD PRESSURE: 76 MMHG | HEIGHT: 64 IN | BODY MASS INDEX: 25.27 KG/M2

## 2018-08-02 DIAGNOSIS — Z46.89 PESSARY MAINTENANCE: Primary | ICD-10-CM

## 2018-08-02 PROCEDURE — 99213 OFFICE O/P EST LOW 20 MIN: CPT | Performed by: NURSE PRACTITIONER

## 2018-08-02 NOTE — TELEPHONE ENCOUNTER
Pt says the pessary she got put in on the 31st is causing a lot of pain right now. She thought it was go away but its got worse. She said is requesting an appt to be seen. I connected her with scheduling to see who has an opening to check her pessary

## 2018-08-03 ENCOUNTER — HOSPITAL ENCOUNTER (OUTPATIENT)
Dept: CT IMAGING | Facility: HOSPITAL | Age: 68
Discharge: HOME | End: 2018-08-03
Attending: OBSTETRICS & GYNECOLOGY | Admitting: OBSTETRICS & GYNECOLOGY

## 2018-08-03 DIAGNOSIS — E28.0 ESTRADIOL EXCESS: ICD-10-CM

## 2018-08-03 LAB — CREAT BLDA-MCNC: 1.1 MG/DL (ref 0.6–1.3)

## 2018-08-03 PROCEDURE — 82565 ASSAY OF CREATININE: CPT

## 2018-08-03 PROCEDURE — 74177 CT ABD & PELVIS W/CONTRAST: CPT

## 2018-08-03 PROCEDURE — 25010000002 IOPAMIDOL 61 % SOLUTION: Performed by: OBSTETRICS & GYNECOLOGY

## 2018-08-03 RX ADMIN — IOPAMIDOL 100 ML: 612 INJECTION, SOLUTION INTRAVENOUS at 08:47

## 2018-08-06 DIAGNOSIS — Z78.0 MENOPAUSE: ICD-10-CM

## 2018-08-06 DIAGNOSIS — E28.0 ESTROGEN EXCESS: Primary | ICD-10-CM

## 2018-08-06 NOTE — PROGRESS NOTES
Please let patient know that her CT showed no evidence of an adrenal tumor.  I want her to come to the office and have her hormone level re-drawn before we consider scanning her head to look for a pituitary tumor.  I am ordering an estradiol level that she can come in and have drawn in our office.  Thanks.

## 2018-08-06 NOTE — PROGRESS NOTES
Liudmila Ga is a 67 y.o.      Chief Complaint   Patient presents with   • Pelvic Pain     Pt here to have her pessary removed. It causing her to cramp. Pt states she is having some poss. vaginal bleeding.           HPI The cube pessary is causing her a lot of vaginal pain and  Backache.  It was just put in recently.    The following portions of the patient's history were reviewed and updated as appropriate:vital signs, allergies, current medications, past family history, past medical history, past social history, past surgical history and problem list.      Current Outpatient Prescriptions:   •  atenolol (TENORMIN) 25 MG tablet, Take 25 mg by mouth Daily., Disp: , Rfl:   •  Biotin 03203 MCG tablet, Take  by mouth., Disp: , Rfl:   •  bisacodyl (DULCOLAX) 5 MG EC tablet, Take 5 mg by mouth Daily As Needed for Constipation., Disp: , Rfl:   •  Cholecalciferol (VITAMIN D3) 2000 units tablet, Take  by mouth., Disp: , Rfl:   •  diclofenac (VOLTAREN) 75 MG EC tablet, , Disp: , Rfl:   •  DULoxetine (CYMBALTA) 30 MG capsule, Take 30 mg by mouth Daily., Disp: , Rfl:   •  fluticasone (FLONASE) 50 MCG/ACT nasal spray, 2 sprays into each nostril Daily., Disp: , Rfl:   •  Omega-3 Fatty Acids (FISH OIL) 1200 MG capsule capsule, Take 1,200 mg by mouth Daily., Disp: , Rfl:   •  SYNTHROID 25 MCG tablet, , Disp: , Rfl:   •  Zinc 50 MG capsule, Take  by mouth., Disp: , Rfl:   •  zolpidem (AMBIEN) 10 MG tablet, Take 10 mg by mouth At Night As Needed for Sleep., Disp: , Rfl:     Review of Systems   Constitutional: Negative for activity change and unexpected weight change.   Respiratory: Negative for shortness of breath.    Cardiovascular: Negative for chest pain.   Gastrointestinal: Positive for constipation (well-managed with stool softeners every day). Negative for abdominal pain and diarrhea.   Genitourinary: Positive for dysuria (burning), frequency and urgency. Negative for dyspareunia, enuresis (none since sling in  "2011), vaginal bleeding, vaginal discharge and vaginal pain.     Breast ROS: negative    Objective      /76   Ht 162.6 cm (64\")   Wt 67.1 kg (148 lb)   Breastfeeding? No   BMI 25.40 kg/m²       Physical Exam   Constitutional: She is oriented to person, place, and time. She appears well-developed and well-nourished.   HENT:   Head: Normocephalic and atraumatic.   Abdominal: Soft. She exhibits no distension. There is no tenderness.   Genitourinary: There is no rash or tenderness on the right labia. There is no rash or tenderness on the left labia. Right adnexum displays tenderness. Right adnexum displays no mass. Left adnexum displays tenderness. Left adnexum displays no mass. No tenderness or bleeding in the vagina. No signs of injury around the vagina. No vaginal discharge found.   Genitourinary Comments: Cube pessary removed due to being painful   Neurological: She is alert and oriented to person, place, and time.   Skin: Skin is warm and dry.   Psychiatric: She has a normal mood and affect. Her behavior is normal.   Nursing note and vitals reviewed.       Assessment/Plan     Liudmila was seen today for pelvic pain.    Diagnoses and all orders for this visit:    Pessary maintenance  Cube pessary removed due to it being painful and causing backache.  Patient will RTO for another fitting.            Claudia Orr, APRN  8/6/2018           "

## 2018-08-07 LAB — ESTRADIOL SERPL-MCNC: 14.2 PG/ML

## 2018-08-07 NOTE — PROGRESS NOTES
Please let patient know that her estradiol level is now normal.  I do not think that she needs any further imaging.  I will call Nigel Ramos and let her know.

## 2018-08-08 ENCOUNTER — OFFICE VISIT (OUTPATIENT)
Dept: OBSTETRICS AND GYNECOLOGY | Facility: CLINIC | Age: 68
End: 2018-08-08

## 2018-08-08 VITALS
BODY MASS INDEX: 25.78 KG/M2 | WEIGHT: 151 LBS | SYSTOLIC BLOOD PRESSURE: 122 MMHG | HEIGHT: 64 IN | DIASTOLIC BLOOD PRESSURE: 77 MMHG

## 2018-08-08 DIAGNOSIS — N81.11 CYSTOCELE, MIDLINE: ICD-10-CM

## 2018-08-08 DIAGNOSIS — N81.6 RECTOCELE: ICD-10-CM

## 2018-08-08 DIAGNOSIS — Z46.89 FITTING AND ADJUSTMENT OF PESSARY: Primary | ICD-10-CM

## 2018-08-08 PROCEDURE — 57160 INSERT PESSARY/OTHER DEVICE: CPT | Performed by: NURSE PRACTITIONER

## 2018-08-08 PROCEDURE — A4562 PESSARY, NON RUBBER,ANY TYPE: HCPCS | Performed by: NURSE PRACTITIONER

## 2018-08-08 RX ORDER — ASCORBIC ACID 500 MG
1000 TABLET ORAL DAILY
COMMUNITY

## 2018-08-09 NOTE — PROGRESS NOTES
"Subjective   Chief Complaint   Patient presents with   • Pessary Check     pt here today for pessary check. pt pessary not in today because she felt like it was sliding out. wanting to try to get fitted with another size.      Liudmila Ga is a 67 y.o. year old .  No LMP recorded. Patient has had a hysterectomy.  She presents to be seen because of her pessary is not fitting well.  The patient has had two different donut pessaries in past few weeks.  Will try a different shape today.     The following portions of the patient's history were reviewed and updated as appropriate:current medications and allergies    Smoking status: Former Smoker                                                              Packs/day: 0.00      Years: 0.00         Quit date: 2012  Smokeless tobacco: Never Used                        Review of Systems   Constitutional: Negative for activity change and unexpected weight change.   Respiratory: Negative for shortness of breath.    Cardiovascular: Negative for chest pain.   Gastrointestinal: Positive for constipation (well-managed with stool softeners every day). Negative for abdominal pain and diarrhea.   Genitourinary: Positive for dysuria (mild tingling) and vaginal pain (\"pressure\".  Pt feels current pessary trying to come out). Negative for difficulty urinating, enuresis (none since sling in ), vaginal bleeding and vaginal discharge.         Objective   /68   Ht 162.6 cm (64\")   Wt 67.6 kg (149 lb)   BMI 25.58 kg/m²     Physical Exam   Constitutional: She is oriented to person, place, and time. She appears well-developed and well-nourished. No distress.   HENT:   Head: Normocephalic and atraumatic.   Eyes: EOM are normal.   Neck: Normal range of motion.   Pulmonary/Chest: Effort normal.   Abdominal: Soft. She exhibits no distension. There is no tenderness.   Genitourinary:   Genitourinary Comments: Donut pessary exchanged for cube with holes   Musculoskeletal: " Normal range of motion.   Neurological: She is alert and oriented to person, place, and time.   Skin: Skin is warm and dry.   Psychiatric: She has a normal mood and affect. Her behavior is normal. Judgment normal.   Nursing note and vitals reviewed.      Lab Review   hormone panel    Imaging   No data reviewed     Assessment & Plan  Liudmila was seen today for pessary check.    Diagnoses and all orders for this visit:    Estradiol excess: I was recently contacted by patient's PCP, who had drawn hormone levels.  Her estradiol was almost 3,000 and Dr. Ramos suggested I be the one to investigate.  This was discussed with the patient today.  CT being ordered to look for an adrenal tumor and patient has been instructed to stop all compounded HRT.  -     CT Abdomen Pelvis With Contrast; Future  -     Comprehensive Metabolic Panel    Female cystocele    Rectocele    Fitting and adjustment of pessary: donut pessary replaced with a cube today.  Vaginal mucosa intact.        This note was electronically signed.    Chloe Waggoner MD  August 9, 2018  7:51 AM    Total time spent today with Liudmila  was 20 minutes (level 3).  Greater than 50% of the time was spent coordinating care, answering her questions and counseling regarding pathophysiology of her presenting problem along with plans for any diagnositc work-up and treatment.

## 2018-08-09 NOTE — PROGRESS NOTES
Liudmila Ga is a 67 y.o.      Chief Complaint   Patient presents with   • Pessary Check     Has tried several pessaries, is here to try again.            ANN Nur is here to try another pessary.  The cube that I removed last week had been in place a few days and became so painful, it slipped down in the vagina and also caused increased rectal pressure and back pain.      The following portions of the patient's history were reviewed and updated as appropriate:vital signs, allergies, current medications, past family history, past medical history, past social history, past surgical history and problem list.      Current Outpatient Prescriptions:   •  atenolol (TENORMIN) 25 MG tablet, Take 25 mg by mouth Daily., Disp: , Rfl:   •  Biotin 26311 MCG tablet, Take  by mouth., Disp: , Rfl:   •  bisacodyl (DULCOLAX) 5 MG EC tablet, Take 5 mg by mouth Daily As Needed for Constipation., Disp: , Rfl:   •  Cholecalciferol (VITAMIN D3) 2000 units tablet, Take  by mouth., Disp: , Rfl:   •  diclofenac (VOLTAREN) 75 MG EC tablet, , Disp: , Rfl:   •  DULoxetine (CYMBALTA) 30 MG capsule, Take 30 mg by mouth Daily., Disp: , Rfl:   •  fluticasone (FLONASE) 50 MCG/ACT nasal spray, 2 sprays into each nostril Daily., Disp: , Rfl:   •  Omega-3 Fatty Acids (FISH OIL) 1200 MG capsule capsule, Take 1,200 mg by mouth Daily., Disp: , Rfl:   •  SYNTHROID 25 MCG tablet, , Disp: , Rfl:   •  vitamin C (ASCORBIC ACID) 500 MG tablet, Take 1,000 mg by mouth Daily., Disp: , Rfl:   •  Zinc 50 MG capsule, Take  by mouth., Disp: , Rfl:   •  zolpidem (AMBIEN) 10 MG tablet, Take 10 mg by mouth At Night As Needed for Sleep., Disp: , Rfl:     Review of Systems   Constitutional: Negative for diaphoresis, fatigue and fever.   HENT: Negative for congestion, dental problem, ear discharge and facial swelling.    Eyes: Negative for blurred vision and double vision.   Respiratory: Negative for cough, chest tightness and stridor.   "  Cardiovascular: Negative for chest pain and leg swelling.   Gastrointestinal: Negative for abdominal distention, abdominal pain, blood in stool and indigestion.   Endocrine: Negative for heat intolerance and polyphagia.   Genitourinary: Positive for pelvic pain and vaginal pain. Negative for difficulty urinating, flank pain, hematuria and breast lump.        Patient has been off estrogen compound for several weeks as her level was over 2500     Rechecked and is now 14    Musculoskeletal: Positive for arthralgias and myalgias.   Neurological: Negative for dizziness, numbness, headache and confusion.   Psychiatric/Behavioral: Negative for agitation and behavioral problems.     Breast ROS: negative    Objective      /77 (BP Location: Left arm, Patient Position: Sitting, Cuff Size: Adult)   Ht 162.6 cm (64\")   Wt 68.5 kg (151 lb)   BMI 25.92 kg/m²       Physical Exam   Constitutional: She is oriented to person, place, and time. She appears well-developed and well-nourished.   Eyes: Right eye exhibits no discharge. Left eye exhibits discharge.   Pulmonary/Chest: Effort normal.   Abdominal: Soft.   Genitourinary: There is no rash or tenderness on the right labia. There is no rash or tenderness on the left labia.   Cervix is not parous and not nulliparous. Cervix does not exhibit motion tenderness or discharge. Right adnexum displays no mass and no tenderness. Left adnexum displays no mass and no tenderness.   Genitourinary Comments: Vaginal vault is slightly short  Rectocele and cystocele   Neurological: She is alert and oriented to person, place, and time.   Skin: Skin is warm and dry.   Psychiatric: She has a normal mood and affect. Her behavior is normal.   Nursing note and vitals reviewed.       Assessment/Plan       Liudmila was seen today for pessary check.    Diagnoses and all orders for this visit:    Fitting and adjustment of pessary  Multiple pessaries were tried and a gelhorn with short tip was " ordered.  Hopefully this will be comfortable for Liudmila.     Her estrogen level (off compounds) now 14, she is having hot flashes and she will start on Premarin .625.  She will be called when pessary arrives.    Rectocele    Cystocele, midline        Claudiashanice Orr, APRN  8/9/2018

## 2018-08-16 ENCOUNTER — OFFICE VISIT (OUTPATIENT)
Dept: OBSTETRICS AND GYNECOLOGY | Facility: CLINIC | Age: 68
End: 2018-08-16

## 2018-08-16 VITALS
HEIGHT: 64 IN | DIASTOLIC BLOOD PRESSURE: 68 MMHG | WEIGHT: 147 LBS | BODY MASS INDEX: 25.1 KG/M2 | SYSTOLIC BLOOD PRESSURE: 110 MMHG

## 2018-08-16 DIAGNOSIS — N81.11 CYSTOCELE, MIDLINE: Primary | ICD-10-CM

## 2018-08-16 DIAGNOSIS — N81.6 RECTOCELE: ICD-10-CM

## 2018-08-16 PROCEDURE — A4562 PESSARY, NON RUBBER,ANY TYPE: HCPCS | Performed by: NURSE PRACTITIONER

## 2018-08-16 PROCEDURE — 57160 INSERT PESSARY/OTHER DEVICE: CPT | Performed by: NURSE PRACTITIONER

## 2018-08-16 NOTE — PROGRESS NOTES
"      Liudmila Ga is a 67 y.o.      Chief Complaint   Patient presents with   • Pessary Check     Pt is here for a pessary insertion.           HPI -Liudmila is here for insertion of a gelhorn 2 1/2 short  For vaginal vault prolaps.  Several pessaries have been tried and hopefully this one will work.        The following portions of the patient's history were reviewed and updated as appropriate:vital signs, allergies, current medications, past family history, past medical history, past social history, past surgical history and problem list.      Current Outpatient Prescriptions:   •  atenolol (TENORMIN) 25 MG tablet, Take 25 mg by mouth Daily., Disp: , Rfl:   •  Biotin 17647 MCG tablet, Take  by mouth., Disp: , Rfl:   •  bisacodyl (DULCOLAX) 5 MG EC tablet, Take 5 mg by mouth Daily As Needed for Constipation., Disp: , Rfl:   •  Cholecalciferol (VITAMIN D3) 2000 units tablet, Take  by mouth., Disp: , Rfl:   •  diclofenac (VOLTAREN) 75 MG EC tablet, , Disp: , Rfl:   •  DULoxetine (CYMBALTA) 30 MG capsule, Take 30 mg by mouth Daily., Disp: , Rfl:   •  estrogens, conjugated, (PREMARIN) 0.625 MG tablet, Take 0.625 mg by mouth Daily. Take daily for 21 days then do not take for 7 days., Disp: , Rfl:   •  fluticasone (FLONASE) 50 MCG/ACT nasal spray, 2 sprays into each nostril Daily., Disp: , Rfl:   •  Omega-3 Fatty Acids (FISH OIL) 1200 MG capsule capsule, Take 1,200 mg by mouth Daily., Disp: , Rfl:   •  SYNTHROID 25 MCG tablet, , Disp: , Rfl:   •  vitamin C (ASCORBIC ACID) 500 MG tablet, Take 1,000 mg by mouth Daily., Disp: , Rfl:   •  Zinc 50 MG capsule, Take  by mouth., Disp: , Rfl:   •  zolpidem (AMBIEN) 10 MG tablet, Take 10 mg by mouth At Night As Needed for Sleep., Disp: , Rfl:     Review of Systems  Breast ROS: negative    Objective      /68   Ht 162.6 cm (64\")   Wt 66.7 kg (147 lb)   BMI 25.23 kg/m²       Physical Exam   Constitutional: She appears well-developed and well-nourished. No " distress.   HENT:   Head: Normocephalic and atraumatic.   Eyes: Right eye exhibits no discharge. Left eye exhibits no discharge.   Pulmonary/Chest: Effort normal.   Abdominal: Soft. She exhibits no distension. There is no tenderness.   Genitourinary: Pelvic exam was performed with patient supine. There is no tenderness or lesion on the right labia. There is no tenderness or lesion on the left labia. Uterus is not enlarged. Right adnexum displays no tenderness and no fullness. Left adnexum displays no tenderness and no fullness. No tenderness or bleeding in the vagina. No signs of injury around the vagina. No vaginal discharge found. There is a cystocele and rectocele present in the vagina.  Genitourinary Comments: relaxed   Neurological: She is alert.   Skin: Skin is dry.   Nursing note and vitals reviewed.       Assessment/Plan     Liudmila was seen today for pessary check.    Diagnoses and all orders for this visit:    Cystocele, midline    Rectocele    #2 1/4 is uncomfortable but does no stay in place when she goes to the BR to void.  # 2 1/2 is inserted and this is comfortable and is stay in place.      Patient is counseled re: BSE, diet, exercise, mammogram, calcium and Vit. D3            Claudia Orr, APRN  8/16/2018

## 2018-11-02 ENCOUNTER — TRANSCRIBE ORDERS (OUTPATIENT)
Dept: ADMINISTRATIVE | Facility: HOSPITAL | Age: 68
End: 2018-11-02

## 2018-11-02 ENCOUNTER — APPOINTMENT (OUTPATIENT)
Dept: LAB | Facility: HOSPITAL | Age: 68
End: 2018-11-02
Attending: INTERNAL MEDICINE

## 2018-11-02 DIAGNOSIS — E03.9 HYPOTHYROIDISM, UNSPECIFIED TYPE: Primary | ICD-10-CM

## 2018-11-02 LAB — TSH SERPL DL<=0.05 MIU/L-ACNC: 0.23 MIU/ML (ref 0.47–4.68)

## 2018-11-02 PROCEDURE — 84443 ASSAY THYROID STIM HORMONE: CPT | Performed by: INTERNAL MEDICINE

## 2018-11-02 PROCEDURE — 36415 COLL VENOUS BLD VENIPUNCTURE: CPT

## 2019-08-06 ENCOUNTER — OFFICE VISIT (OUTPATIENT)
Dept: GASTROENTEROLOGY | Facility: CLINIC | Age: 69
End: 2019-08-06

## 2019-08-06 VITALS
WEIGHT: 155 LBS | OXYGEN SATURATION: 94 % | HEIGHT: 64 IN | BODY MASS INDEX: 26.46 KG/M2 | HEART RATE: 76 BPM | SYSTOLIC BLOOD PRESSURE: 142 MMHG | DIASTOLIC BLOOD PRESSURE: 78 MMHG

## 2019-08-06 DIAGNOSIS — R13.19 ESOPHAGEAL DYSPHAGIA: ICD-10-CM

## 2019-08-06 DIAGNOSIS — E66.9 OBESITY, UNSPECIFIED OBESITY SEVERITY, UNSPECIFIED OBESITY TYPE: ICD-10-CM

## 2019-08-06 DIAGNOSIS — Z78.9 NONSMOKER: ICD-10-CM

## 2019-08-06 DIAGNOSIS — Z79.1 NSAID LONG-TERM USE: ICD-10-CM

## 2019-08-06 DIAGNOSIS — K21.9 GASTROESOPHAGEAL REFLUX DISEASE WITHOUT ESOPHAGITIS: Primary | ICD-10-CM

## 2019-08-06 PROCEDURE — 99214 OFFICE O/P EST MOD 30 MIN: CPT | Performed by: CLINICAL NURSE SPECIALIST

## 2019-08-06 RX ORDER — OMEPRAZOLE 20 MG/1
20 CAPSULE, DELAYED RELEASE ORAL DAILY
Qty: 90 CAPSULE | Refills: 4 | Status: SHIPPED | OUTPATIENT
Start: 2019-08-06 | End: 2019-08-19 | Stop reason: HOSPADM

## 2019-08-06 RX ORDER — OMEPRAZOLE 20 MG/1
20 CAPSULE, DELAYED RELEASE ORAL DAILY
COMMUNITY
End: 2019-08-06 | Stop reason: SDUPTHER

## 2019-08-06 NOTE — PROGRESS NOTES
Liudmila Ga  1950 8/6/2019  Chief Complaint   Patient presents with   • GI Problem     Burping and nausea     Subjective   HPI  Liudmila Ga is a 68 y.o. female who presents with a complaint of progressive ongoing reflux, nausea increased belching. No certain triggers. She has managed on Prilosec in the past but has been off of this for year or more and managing on Gaviscon. It is moderate to severe at times. She has associated dysphagia as well Mid esophageal region. Vomiting helps. It is with oral intake that is typically meat such as a burger. This is moderate for her. Dilatation in the past has helped her. It comes and goes. No wt loss. No fever. No change in bowels. She is up to date with her colonoscopy.    Past Medical History:   Diagnosis Date   • Arthritis    • Colon polyps    • GERD (gastroesophageal reflux disease)    • Hiatal hernia    • Osteopenia    • Overactive bladder    • Vitamin D deficiency      Past Surgical History:   Procedure Laterality Date   • CHOLECYSTECTOMY  2004   • COLONOSCOPY  08/20/2014    HYPERPLASTIC POLYP ILEOCECAL VALVE AND HEPATIC FLEXURE, DIVERTICULA IN SIGMOID COLON   • COLONOSCOPY  02/04/2014    TUBULAR ADENOMA CECAL POLYP, TUBULAR ADENOMA RECTAL POLYP, HYPERPLASTICS POLYPS AT ILEOCECAL VALVE AND RECTAL.   • COLONOSCOPY N/A 12/11/2017    2 Sessile serrated adenomas large intestine and hepatic flexure, 2 tubular adenomas cecum and at 60 cm  repeat exam in 3 years   • ENDOSCOPY  12/22/2015    HIATAL HERNIA, MILD SCHATZKI RING DILATED   • ENDOSCOPY N/A 11/7/2017    HH, dilated otherwise normal exam   • HYSTERECTOMY  06/2011    TOTAL   • TONSILLECTOMY         Outpatient Medications Marked as Taking for the 8/6/19 encounter (Office Visit) with Allegra Houser APRN   Medication Sig Dispense Refill   • atenolol (TENORMIN) 25 MG tablet Take 25 mg by mouth Daily.     • Biotin 27011 MCG tablet Take  by mouth.     • bisacodyl (DULCOLAX) 5 MG EC tablet Take 5 mg by  mouth Daily As Needed for Constipation.     • Cholecalciferol (VITAMIN D3) 2000 units tablet Take  by mouth.     • diclofenac (VOLTAREN) 75 MG EC tablet      • DULoxetine (CYMBALTA) 30 MG capsule Take 30 mg by mouth Daily.     • fluticasone (FLONASE) 50 MCG/ACT nasal spray 2 sprays into each nostril Daily.     • Omega-3 Fatty Acids (FISH OIL) 1200 MG capsule capsule Take 1,200 mg by mouth Daily.     • omeprazole (priLOSEC) 20 MG capsule Take 1 capsule by mouth Daily. 90 capsule 4   • SYNTHROID 25 MCG tablet      • vitamin C (ASCORBIC ACID) 500 MG tablet Take 1,000 mg by mouth Daily.     • Zinc 50 MG capsule Take  by mouth.     • zolpidem (AMBIEN) 10 MG tablet Take 10 mg by mouth At Night As Needed for Sleep.     • [DISCONTINUED] omeprazole (priLOSEC) 20 MG capsule Take 20 mg by mouth Daily.       Allergies   Allergen Reactions   • Lortab [Hydrocodone-Acetaminophen] Itching   • Sulfa Antibiotics Unknown (See Comments)     unknown     Social History     Socioeconomic History   • Marital status:      Spouse name: Not on file   • Number of children: Not on file   • Years of education: Not on file   • Highest education level: Not on file   Tobacco Use   • Smoking status: Former Smoker     Last attempt to quit: 2012     Years since quittin.5   • Smokeless tobacco: Never Used   Substance and Sexual Activity   • Alcohol use: Yes     Comment: RARE   • Drug use: No   • Sexual activity: No     Family History   Problem Relation Age of Onset   • Liver cancer Mother    • Colon cancer Brother    • Colon polyps Brother    • Breast cancer Neg Hx    • Ovarian cancer Neg Hx      Health Maintenance   Topic Date Due   • TDAP/TD VACCINES (1 - Tdap) 1967   • ZOSTER VACCINE (2 of 3) 2009   • PNEUMOCOCCAL VACCINES (65+ LOW/MEDIUM RISK) (1 of 2 - PCV13) 2013   • HEPATITIS C SCREENING  2017   • MEDICARE ANNUAL WELLNESS  2017   • COLOGUARD  2017   • DXA SCAN  2018   • INFLUENZA VACCINE   "08/01/2019   • MAMMOGRAM  06/19/2020     Review of Systems   Constitutional: Negative for activity change, appetite change, chills, diaphoresis, fatigue, fever and unexpected weight change.   HENT: Positive for trouble swallowing. Negative for ear pain, hearing loss, mouth sores, sore throat and voice change.    Eyes: Negative.    Respiratory: Negative for cough, choking, shortness of breath and wheezing.    Cardiovascular: Negative for chest pain and palpitations.   Gastrointestinal: Positive for nausea. Negative for abdominal pain, blood in stool, constipation, diarrhea and vomiting.        Belching and dyspepsia   Endocrine: Negative for cold intolerance and heat intolerance.   Genitourinary: Negative for decreased urine volume, dysuria, frequency, hematuria and urgency.   Musculoskeletal: Negative for back pain, gait problem and myalgias.   Skin: Negative for color change, pallor and rash.   Allergic/Immunologic: Negative for food allergies and immunocompromised state.   Neurological: Negative for dizziness, tremors, seizures, syncope, weakness, light-headedness, numbness and headaches.   Hematological: Negative for adenopathy. Does not bruise/bleed easily.   Psychiatric/Behavioral: Negative for agitation and confusion. The patient is not nervous/anxious.    All other systems reviewed and are negative.    Objective   Vitals:    08/06/19 1042   BP: 142/78   Pulse: 76   SpO2: 94%   Weight: 70.3 kg (155 lb)   Height: 162.6 cm (64\")     Body mass index is 26.61 kg/m².  Physical Exam   Constitutional: She is oriented to person, place, and time. She appears well-developed and well-nourished.   HENT:   Head: Normocephalic and atraumatic.   Eyes: Pupils are equal, round, and reactive to light.   Neck: Normal range of motion. Neck supple. No tracheal deviation present.   Cardiovascular: Normal rate, regular rhythm and normal heart sounds. Exam reveals no gallop and no friction rub.   No murmur heard.  Pulmonary/Chest: " Effort normal and breath sounds normal. No respiratory distress. She has no wheezes. She has no rales. She exhibits no tenderness.   Abdominal: Soft. Bowel sounds are normal. She exhibits no distension. There is no hepatosplenomegaly. There is no tenderness. There is no rigidity, no rebound and no guarding.   Musculoskeletal: Normal range of motion. She exhibits no edema, tenderness or deformity.   Neurological: She is alert and oriented to person, place, and time. She has normal reflexes.   Skin: Skin is warm and dry. No rash noted. No pallor.   Psychiatric: She has a normal mood and affect. Her behavior is normal. Judgment and thought content normal.     Assessment/Plan   Liudmila was seen today for gi problem.    Diagnoses and all orders for this visit:    Gastroesophageal reflux disease without esophagitis  -     Cancel: Case Request; Standing  -     Cancel: Follow Anesthesia Guidelines / Standing Orders; Future  -     Cancel: Obtain Informed Consent; Future  -     Cancel: Implement Anesthesia Orders Day of Procedure; Standing  -     Cancel: Obtain Informed Consent; Standing  -     Cancel: Case Request  -     Case Request; Standing  -     Case Request  -     omeprazole (priLOSEC) 20 MG capsule; Take 1 capsule by mouth Daily.    Esophageal dysphagia    NSAID long-term use  Comments:  suggested dicontinuing    Obesity, unspecified obesity severity, unspecified obesity type    Nonsmoker    Other orders  -     Follow Anesthesia Guidelines / Standing Orders; Future  -     Obtain Informed Consent; Future  -     Implement Anesthesia Orders Day of Procedure; Standing  -     Obtain Informed Consent; Standing    I discussed non pharmaceutical treatment of gerd.  This includes gradually losing weight to achieve ideal body wt., elevation of the head of bed by 4-6 inches, nothing to eat or drink 3 hours prior to lying down, avoiding tight clothing, stress reduction, tobacco cessation, reduction of alcohol intake, and dietary  restrictions (avoiding caffeine, coffee, fatty foods, mints, chocolate, spicy foods and tomato based sauces as much as possible).    Smaller more frequent meals discussed  She is back on her Prilosec, to continue.    ESOPHAGOGASTRODUODENOSCOPY WITH ANESTHESIA (N/A)  EMR Dragon/transcription disclaimer: Much of this encounter note is electronic transcription/translation of spoken language to printed text. The electronic translation of spoken language may be erroneous, or at times, nonsensical words or phrases may be inadvertently transcribed. Although I have reviewed the note for such errors, some may still exist.  Body mass index is 26.61 kg/m².  No Follow-up on file.    Patient's Body mass index is 26.61 kg/m². BMI is above normal parameters. Recommendations include: nutrition counseling.      All risks, benefits, alternatives, and indications of colonoscopy and/or Endoscopy procedure have been discussed with the patient. Risks to include perforation of the colon requiring possible surgery or colostomy, risk of bleeding from biopsies or removal of colon tissue, possibility of missing a colon polyp or cancer, or adverse drug reaction.  Benefits to include the diagnosis and management of disease of the colon and rectum. Alternatives to include barium enema, radiographic evaluation, lab testing or no intervention. Pt verbalizes understanding and agrees.     Allegra Houser, APRN  8/6/2019  11:03 AM      Obesity, Adult  Obesity is the condition of having too much total body fat. Being overweight or obese means that your weight is greater than what is considered healthy for your body size. Obesity is determined by a measurement called BMI. BMI is an estimate of body fat and is calculated from height and weight. For adults, a BMI of 30 or higher is considered obese.  Obesity can eventually lead to other health concerns and major illnesses, including:  · Stroke.  · Coronary artery disease (CAD).  · Type 2  diabetes.  · Some types of cancer, including cancers of the colon, breast, uterus, and gallbladder.  · Osteoarthritis.  · High blood pressure (hypertension).  · High cholesterol.  · Sleep apnea.  · Gallbladder stones.  · Infertility problems.  What are the causes?  The main cause of obesity is taking in (consuming) more calories than your body uses for energy. Other factors that contribute to this condition may include:  · Being born with genes that make you more likely to become obese.  · Having a medical condition that causes obesity. These conditions include:  ¨ Hypothyroidism.  ¨ Polycystic ovarian syndrome (PCOS).  ¨ Binge-eating disorder.  ¨ Cushing syndrome.  · Taking certain medicines, such as steroids, antidepressants, and seizure medicines.  · Not being physically active (sedentary lifestyle).  · Living where there are limited places to exercise safely or buy healthy foods.  · Not getting enough sleep.  What increases the risk?  The following factors may increase your risk of this condition:  · Having a family history of obesity.  · Being a woman of -American descent.  · Being a man of  descent.  What are the signs or symptoms?  Having excessive body fat is the main symptom of this condition.  How is this diagnosed?  This condition may be diagnosed based on:  · Your symptoms.  · Your medical history.  · A physical exam. Your health care provider may measure:  ¨ Your BMI. If you are an adult with a BMI between 25 and less than 30, you are considered overweight. If you are an adult with a BMI of 30 or higher, you are considered obese.  ¨ The distances around your hips and your waist (circumferences). These may be compared to each other to help diagnose your condition.  ¨ Your skinfold thickness. Your health care provider may gently pinch a fold of your skin and measure it.  How is this treated?  Treatment for this condition often includes changing your lifestyle. Treatment may include some or  all of the following:  · Dietary changes. Work with your health care provider and a dietitian to set a weight-loss goal that is healthy and reasonable for you. Dietary changes may include eating:  ¨ Smaller portions. A portion size is the amount of a particular food that is healthy for you to eat at one time. This varies from person to person.  ¨ Low-calorie or low-fat options.  ¨ More whole grains, fruits, and vegetables.  · Regular physical activity. This may include aerobic activity (cardio) and strength training.  · Medicine to help you lose weight. Your health care provider may prescribe medicine if you are unable to lose 1 pound a week after 6 weeks of eating more healthily and doing more physical activity.  · Surgery. Surgical options may include gastric banding and gastric bypass. Surgery may be done if:  ¨ Other treatments have not helped to improve your condition.  ¨ You have a BMI of 40 or higher.  ¨ You have life-threatening health problems related to obesity.  Follow these instructions at home:     Eating and drinking     · Follow recommendations from your health care provider about what you eat and drink. Your health care provider may advise you to:  ¨ Limit fast foods, sweets, and processed snack foods.  ¨ Choose low-fat options, such as low-fat milk instead of whole milk.  ¨ Eat 5 or more servings of fruits or vegetables every day.  ¨ Eat at home more often. This gives you more control over what you eat.  ¨ Choose healthy foods when you eat out.  ¨ Learn what a healthy portion size is.  ¨ Keep low-fat snacks on hand.  ¨ Avoid sugary drinks, such as soda, fruit juice, iced tea sweetened with sugar, and flavored milk.  ¨ Eat a healthy breakfast.  · Drink enough water to keep your urine clear or pale yellow.  · Do not go without eating for long periods of time (do not fast) or follow a fad diet. Fasting and fad diets can be unhealthy and even dangerous.  Physical Activity   · Exercise regularly, as told  by your health care provider. Ask your health care provider what types of exercise are safe for you and how often you should exercise.  · Warm up and stretch before being active.  · Cool down and stretch after being active.  · Rest between periods of activity.  Lifestyle   · Limit the time that you spend in front of your TV, computer, or video game system.  · Find ways to reward yourself that do not involve food.  · Limit alcohol intake to no more than 1 drink a day for nonpregnant women and 2 drinks a day for men. One drink equals 12 oz of beer, 5 oz of wine, or 1½ oz of hard liquor.  General instructions   · Keep a weight loss journal to keep track of the food you eat and how much you exercise you get.  · Take over-the-counter and prescription medicines only as told by your health care provider.  · Take vitamins and supplements only as told by your health care provider.  · Consider joining a support group. Your health care provider may be able to recommend a support group.  · Keep all follow-up visits as told by your health care provider. This is important.  Contact a health care provider if:  · You are unable to meet your weight loss goal after 6 weeks of dietary and lifestyle changes.  This information is not intended to replace advice given to you by your health care provider. Make sure you discuss any questions you have with your health care provider.  Document Released: 01/25/2006 Document Revised: 05/22/2017 Document Reviewed: 10/05/2016  [employer] Interactive Patient Education © 2017 [employer]      If you smoke or use tobacco, 4 minutes reading provided  Steps to Quit Smoking  Smoking tobacco can be harmful to your health and can affect almost every organ in your body. Smoking puts you, and those around you, at risk for developing many serious chronic diseases. Quitting smoking is difficult, but it is one of the best things that you can do for your health. It is never too late to quit.  What are the  benefits of quitting smoking?  When you quit smoking, you lower your risk of developing serious diseases and conditions, such as:  · Lung cancer or lung disease, such as COPD.  · Heart disease.  · Stroke.  · Heart attack.  · Infertility.  · Osteoporosis and bone fractures.  Additionally, symptoms such as coughing, wheezing, and shortness of breath may get better when you quit. You may also find that you get sick less often because your body is stronger at fighting off colds and infections. If you are pregnant, quitting smoking can help to reduce your chances of having a baby of low birth weight.  How do I get ready to quit?  When you decide to quit smoking, create a plan to make sure that you are successful. Before you quit:  · Pick a date to quit. Set a date within the next two weeks to give you time to prepare.  · Write down the reasons why you are quitting. Keep this list in places where you will see it often, such as on your bathroom mirror or in your car or wallet.  · Identify the people, places, things, and activities that make you want to smoke (triggers) and avoid them. Make sure to take these actions:  ¨ Throw away all cigarettes at home, at work, and in your car.  ¨ Throw away smoking accessories, such as ashtrays and lighters.  ¨ Clean your car and make sure to empty the ashtray.  ¨ Clean your home, including curtains and carpets.  · Tell your family, friends, and coworkers that you are quitting. Support from your loved ones can make quitting easier.  · Talk with your health care provider about your options for quitting smoking.  · Find out what treatment options are covered by your health insurance.  What strategies can I use to quit smoking?  Talk with your healthcare provider about different strategies to quit smoking. Some strategies include:  · Quitting smoking altogether instead of gradually lessening how much you smoke over a period of time. Research shows that quitting “cold turkey” is more  successful than gradually quitting.  · Attending in-person counseling to help you build problem-solving skills. You are more likely to have success in quitting if you attend several counseling sessions. Even short sessions of 10 minutes can be effective.  · Finding resources and support systems that can help you to quit smoking and remain smoke-free after you quit. These resources are most helpful when you use them often. They can include:  ¨ Online chats with a counselor.  ¨ Telephone quitlines.  ¨ Printed self-help materials.  ¨ Support groups or group counseling.  ¨ Text messaging programs.  ¨ Mobile phone applications.  · Taking medicines to help you quit smoking. (If you are pregnant or breastfeeding, talk with your health care provider first.) Some medicines contain nicotine and some do not. Both types of medicines help with cravings, but the medicines that include nicotine help to relieve withdrawal symptoms. Your health care provider may recommend:  ¨ Nicotine patches, gum, or lozenges.  ¨ Nicotine inhalers or sprays.  ¨ Non-nicotine medicine that is taken by mouth.  Talk with your health care provider about combining strategies, such as taking medicines while you are also receiving in-person counseling. Using these two strategies together makes you more likely to succeed in quitting than if you used either strategy on its own.  If you are pregnant or breastfeeding, talk with your health care provider about finding counseling or other support strategies to quit smoking. Do not take medicine to help you quit smoking unless told to do so by your health care provider.  What things can I do to make it easier to quit?  Quitting smoking might feel overwhelming at first, but there is a lot that you can do to make it easier. Take these important actions:  · Reach out to your family and friends and ask that they support and encourage you during this time. Call telephone quitlines, reach out to support groups, or work  with a counselor for support.  · Ask people who smoke to avoid smoking around you.  · Avoid places that trigger you to smoke, such as bars, parties, or smoke-break areas at work.  · Spend time around people who do not smoke.  · Lessen stress in your life, because stress can be a smoking trigger for some people. To lessen stress, try:  ¨ Exercising regularly.  ¨ Deep-breathing exercises.  ¨ Yoga.  ¨ Meditating.  ¨ Performing a body scan. This involves closing your eyes, scanning your body from head to toe, and noticing which parts of your body are particularly tense. Purposefully relax the muscles in those areas.  · Download or purchase mobile phone or tablet apps (applications) that can help you stick to your quit plan by providing reminders, tips, and encouragement. There are many free apps, such as QuitGuide from the CDC (Centers for Disease Control and Prevention). You can find other support for quitting smoking (smoking cessation) through smokefree.gov and other websites.  How will I feel when I quit smoking?  Within the first 24 hours of quitting smoking, you may start to feel some withdrawal symptoms. These symptoms are usually most noticeable 2-3 days after quitting, but they usually do not last beyond 2-3 weeks. Changes or symptoms that you might experience include:  · Mood swings.  · Restlessness, anxiety, or irritation.  · Difficulty concentrating.  · Dizziness.  · Strong cravings for sugary foods in addition to nicotine.  · Mild weight gain.  · Constipation.  · Nausea.  · Coughing or a sore throat.  · Changes in how your medicines work in your body.  · A depressed mood.  · Difficulty sleeping (insomnia).  After the first 2-3 weeks of quitting, you may start to notice more positive results, such as:  · Improved sense of smell and taste.  · Decreased coughing and sore throat.  · Slower heart rate.  · Lower blood pressure.  · Clearer skin.  · The ability to breathe more easily.  · Fewer sick days.  Quitting  smoking is very challenging for most people. Do not get discouraged if you are not successful the first time. Some people need to make many attempts to quit before they achieve long-term success. Do your best to stick to your quit plan, and talk with your health care provider if you have any questions or concerns.  This information is not intended to replace advice given to you by your health care provider. Make sure you discuss any questions you have with your health care provider.  Document Released: 12/12/2002 Document Revised: 08/15/2017 Document Reviewed: 05/03/2016  [employer] Interactive Patient Education © 2017 [employer]

## 2019-08-06 NOTE — H&P (VIEW-ONLY)
Liudmila Ga  1950 8/6/2019  Chief Complaint   Patient presents with   • GI Problem     Burping and nausea     Subjective   HPI  Liudmila Ga is a 68 y.o. female who presents with a complaint of progressive ongoing reflux, nausea increased belching. No certain triggers. She has managed on Prilosec in the past but has been off of this for year or more and managing on Gaviscon. It is moderate to severe at times. She has associated dysphagia as well Mid esophageal region. Vomiting helps. It is with oral intake that is typically meat such as a burger. This is moderate for her. Dilatation in the past has helped her. It comes and goes. No wt loss. No fever. No change in bowels. She is up to date with her colonoscopy.    Past Medical History:   Diagnosis Date   • Arthritis    • Colon polyps    • GERD (gastroesophageal reflux disease)    • Hiatal hernia    • Osteopenia    • Overactive bladder    • Vitamin D deficiency      Past Surgical History:   Procedure Laterality Date   • CHOLECYSTECTOMY  2004   • COLONOSCOPY  08/20/2014    HYPERPLASTIC POLYP ILEOCECAL VALVE AND HEPATIC FLEXURE, DIVERTICULA IN SIGMOID COLON   • COLONOSCOPY  02/04/2014    TUBULAR ADENOMA CECAL POLYP, TUBULAR ADENOMA RECTAL POLYP, HYPERPLASTICS POLYPS AT ILEOCECAL VALVE AND RECTAL.   • COLONOSCOPY N/A 12/11/2017    2 Sessile serrated adenomas large intestine and hepatic flexure, 2 tubular adenomas cecum and at 60 cm  repeat exam in 3 years   • ENDOSCOPY  12/22/2015    HIATAL HERNIA, MILD SCHATZKI RING DILATED   • ENDOSCOPY N/A 11/7/2017    HH, dilated otherwise normal exam   • HYSTERECTOMY  06/2011    TOTAL   • TONSILLECTOMY         Outpatient Medications Marked as Taking for the 8/6/19 encounter (Office Visit) with Allegra Houser APRN   Medication Sig Dispense Refill   • atenolol (TENORMIN) 25 MG tablet Take 25 mg by mouth Daily.     • Biotin 88184 MCG tablet Take  by mouth.     • bisacodyl (DULCOLAX) 5 MG EC tablet Take 5 mg by  mouth Daily As Needed for Constipation.     • Cholecalciferol (VITAMIN D3) 2000 units tablet Take  by mouth.     • diclofenac (VOLTAREN) 75 MG EC tablet      • DULoxetine (CYMBALTA) 30 MG capsule Take 30 mg by mouth Daily.     • fluticasone (FLONASE) 50 MCG/ACT nasal spray 2 sprays into each nostril Daily.     • Omega-3 Fatty Acids (FISH OIL) 1200 MG capsule capsule Take 1,200 mg by mouth Daily.     • omeprazole (priLOSEC) 20 MG capsule Take 1 capsule by mouth Daily. 90 capsule 4   • SYNTHROID 25 MCG tablet      • vitamin C (ASCORBIC ACID) 500 MG tablet Take 1,000 mg by mouth Daily.     • Zinc 50 MG capsule Take  by mouth.     • zolpidem (AMBIEN) 10 MG tablet Take 10 mg by mouth At Night As Needed for Sleep.     • [DISCONTINUED] omeprazole (priLOSEC) 20 MG capsule Take 20 mg by mouth Daily.       Allergies   Allergen Reactions   • Lortab [Hydrocodone-Acetaminophen] Itching   • Sulfa Antibiotics Unknown (See Comments)     unknown     Social History     Socioeconomic History   • Marital status:      Spouse name: Not on file   • Number of children: Not on file   • Years of education: Not on file   • Highest education level: Not on file   Tobacco Use   • Smoking status: Former Smoker     Last attempt to quit: 2012     Years since quittin.5   • Smokeless tobacco: Never Used   Substance and Sexual Activity   • Alcohol use: Yes     Comment: RARE   • Drug use: No   • Sexual activity: No     Family History   Problem Relation Age of Onset   • Liver cancer Mother    • Colon cancer Brother    • Colon polyps Brother    • Breast cancer Neg Hx    • Ovarian cancer Neg Hx      Health Maintenance   Topic Date Due   • TDAP/TD VACCINES (1 - Tdap) 1967   • ZOSTER VACCINE (2 of 3) 2009   • PNEUMOCOCCAL VACCINES (65+ LOW/MEDIUM RISK) (1 of 2 - PCV13) 2013   • HEPATITIS C SCREENING  2017   • MEDICARE ANNUAL WELLNESS  2017   • COLOGUARD  2017   • DXA SCAN  2018   • INFLUENZA VACCINE   "08/01/2019   • MAMMOGRAM  06/19/2020     Review of Systems   Constitutional: Negative for activity change, appetite change, chills, diaphoresis, fatigue, fever and unexpected weight change.   HENT: Positive for trouble swallowing. Negative for ear pain, hearing loss, mouth sores, sore throat and voice change.    Eyes: Negative.    Respiratory: Negative for cough, choking, shortness of breath and wheezing.    Cardiovascular: Negative for chest pain and palpitations.   Gastrointestinal: Positive for nausea. Negative for abdominal pain, blood in stool, constipation, diarrhea and vomiting.        Belching and dyspepsia   Endocrine: Negative for cold intolerance and heat intolerance.   Genitourinary: Negative for decreased urine volume, dysuria, frequency, hematuria and urgency.   Musculoskeletal: Negative for back pain, gait problem and myalgias.   Skin: Negative for color change, pallor and rash.   Allergic/Immunologic: Negative for food allergies and immunocompromised state.   Neurological: Negative for dizziness, tremors, seizures, syncope, weakness, light-headedness, numbness and headaches.   Hematological: Negative for adenopathy. Does not bruise/bleed easily.   Psychiatric/Behavioral: Negative for agitation and confusion. The patient is not nervous/anxious.    All other systems reviewed and are negative.    Objective   Vitals:    08/06/19 1042   BP: 142/78   Pulse: 76   SpO2: 94%   Weight: 70.3 kg (155 lb)   Height: 162.6 cm (64\")     Body mass index is 26.61 kg/m².  Physical Exam   Constitutional: She is oriented to person, place, and time. She appears well-developed and well-nourished.   HENT:   Head: Normocephalic and atraumatic.   Eyes: Pupils are equal, round, and reactive to light.   Neck: Normal range of motion. Neck supple. No tracheal deviation present.   Cardiovascular: Normal rate, regular rhythm and normal heart sounds. Exam reveals no gallop and no friction rub.   No murmur heard.  Pulmonary/Chest: " Effort normal and breath sounds normal. No respiratory distress. She has no wheezes. She has no rales. She exhibits no tenderness.   Abdominal: Soft. Bowel sounds are normal. She exhibits no distension. There is no hepatosplenomegaly. There is no tenderness. There is no rigidity, no rebound and no guarding.   Musculoskeletal: Normal range of motion. She exhibits no edema, tenderness or deformity.   Neurological: She is alert and oriented to person, place, and time. She has normal reflexes.   Skin: Skin is warm and dry. No rash noted. No pallor.   Psychiatric: She has a normal mood and affect. Her behavior is normal. Judgment and thought content normal.     Assessment/Plan   Liudmila was seen today for gi problem.    Diagnoses and all orders for this visit:    Gastroesophageal reflux disease without esophagitis  -     Cancel: Case Request; Standing  -     Cancel: Follow Anesthesia Guidelines / Standing Orders; Future  -     Cancel: Obtain Informed Consent; Future  -     Cancel: Implement Anesthesia Orders Day of Procedure; Standing  -     Cancel: Obtain Informed Consent; Standing  -     Cancel: Case Request  -     Case Request; Standing  -     Case Request  -     omeprazole (priLOSEC) 20 MG capsule; Take 1 capsule by mouth Daily.    Esophageal dysphagia    NSAID long-term use  Comments:  suggested dicontinuing    Obesity, unspecified obesity severity, unspecified obesity type    Nonsmoker    Other orders  -     Follow Anesthesia Guidelines / Standing Orders; Future  -     Obtain Informed Consent; Future  -     Implement Anesthesia Orders Day of Procedure; Standing  -     Obtain Informed Consent; Standing    I discussed non pharmaceutical treatment of gerd.  This includes gradually losing weight to achieve ideal body wt., elevation of the head of bed by 4-6 inches, nothing to eat or drink 3 hours prior to lying down, avoiding tight clothing, stress reduction, tobacco cessation, reduction of alcohol intake, and dietary  restrictions (avoiding caffeine, coffee, fatty foods, mints, chocolate, spicy foods and tomato based sauces as much as possible).    Smaller more frequent meals discussed  She is back on her Prilosec, to continue.    ESOPHAGOGASTRODUODENOSCOPY WITH ANESTHESIA (N/A)  EMR Dragon/transcription disclaimer: Much of this encounter note is electronic transcription/translation of spoken language to printed text. The electronic translation of spoken language may be erroneous, or at times, nonsensical words or phrases may be inadvertently transcribed. Although I have reviewed the note for such errors, some may still exist.  Body mass index is 26.61 kg/m².  No Follow-up on file.    Patient's Body mass index is 26.61 kg/m². BMI is above normal parameters. Recommendations include: nutrition counseling.      All risks, benefits, alternatives, and indications of colonoscopy and/or Endoscopy procedure have been discussed with the patient. Risks to include perforation of the colon requiring possible surgery or colostomy, risk of bleeding from biopsies or removal of colon tissue, possibility of missing a colon polyp or cancer, or adverse drug reaction.  Benefits to include the diagnosis and management of disease of the colon and rectum. Alternatives to include barium enema, radiographic evaluation, lab testing or no intervention. Pt verbalizes understanding and agrees.     Allegra Houser, APRN  8/6/2019  11:03 AM      Obesity, Adult  Obesity is the condition of having too much total body fat. Being overweight or obese means that your weight is greater than what is considered healthy for your body size. Obesity is determined by a measurement called BMI. BMI is an estimate of body fat and is calculated from height and weight. For adults, a BMI of 30 or higher is considered obese.  Obesity can eventually lead to other health concerns and major illnesses, including:  · Stroke.  · Coronary artery disease (CAD).  · Type 2  diabetes.  · Some types of cancer, including cancers of the colon, breast, uterus, and gallbladder.  · Osteoarthritis.  · High blood pressure (hypertension).  · High cholesterol.  · Sleep apnea.  · Gallbladder stones.  · Infertility problems.  What are the causes?  The main cause of obesity is taking in (consuming) more calories than your body uses for energy. Other factors that contribute to this condition may include:  · Being born with genes that make you more likely to become obese.  · Having a medical condition that causes obesity. These conditions include:  ¨ Hypothyroidism.  ¨ Polycystic ovarian syndrome (PCOS).  ¨ Binge-eating disorder.  ¨ Cushing syndrome.  · Taking certain medicines, such as steroids, antidepressants, and seizure medicines.  · Not being physically active (sedentary lifestyle).  · Living where there are limited places to exercise safely or buy healthy foods.  · Not getting enough sleep.  What increases the risk?  The following factors may increase your risk of this condition:  · Having a family history of obesity.  · Being a woman of -American descent.  · Being a man of  descent.  What are the signs or symptoms?  Having excessive body fat is the main symptom of this condition.  How is this diagnosed?  This condition may be diagnosed based on:  · Your symptoms.  · Your medical history.  · A physical exam. Your health care provider may measure:  ¨ Your BMI. If you are an adult with a BMI between 25 and less than 30, you are considered overweight. If you are an adult with a BMI of 30 or higher, you are considered obese.  ¨ The distances around your hips and your waist (circumferences). These may be compared to each other to help diagnose your condition.  ¨ Your skinfold thickness. Your health care provider may gently pinch a fold of your skin and measure it.  How is this treated?  Treatment for this condition often includes changing your lifestyle. Treatment may include some or  all of the following:  · Dietary changes. Work with your health care provider and a dietitian to set a weight-loss goal that is healthy and reasonable for you. Dietary changes may include eating:  ¨ Smaller portions. A portion size is the amount of a particular food that is healthy for you to eat at one time. This varies from person to person.  ¨ Low-calorie or low-fat options.  ¨ More whole grains, fruits, and vegetables.  · Regular physical activity. This may include aerobic activity (cardio) and strength training.  · Medicine to help you lose weight. Your health care provider may prescribe medicine if you are unable to lose 1 pound a week after 6 weeks of eating more healthily and doing more physical activity.  · Surgery. Surgical options may include gastric banding and gastric bypass. Surgery may be done if:  ¨ Other treatments have not helped to improve your condition.  ¨ You have a BMI of 40 or higher.  ¨ You have life-threatening health problems related to obesity.  Follow these instructions at home:     Eating and drinking     · Follow recommendations from your health care provider about what you eat and drink. Your health care provider may advise you to:  ¨ Limit fast foods, sweets, and processed snack foods.  ¨ Choose low-fat options, such as low-fat milk instead of whole milk.  ¨ Eat 5 or more servings of fruits or vegetables every day.  ¨ Eat at home more often. This gives you more control over what you eat.  ¨ Choose healthy foods when you eat out.  ¨ Learn what a healthy portion size is.  ¨ Keep low-fat snacks on hand.  ¨ Avoid sugary drinks, such as soda, fruit juice, iced tea sweetened with sugar, and flavored milk.  ¨ Eat a healthy breakfast.  · Drink enough water to keep your urine clear or pale yellow.  · Do not go without eating for long periods of time (do not fast) or follow a fad diet. Fasting and fad diets can be unhealthy and even dangerous.  Physical Activity   · Exercise regularly, as told  by your health care provider. Ask your health care provider what types of exercise are safe for you and how often you should exercise.  · Warm up and stretch before being active.  · Cool down and stretch after being active.  · Rest between periods of activity.  Lifestyle   · Limit the time that you spend in front of your TV, computer, or video game system.  · Find ways to reward yourself that do not involve food.  · Limit alcohol intake to no more than 1 drink a day for nonpregnant women and 2 drinks a day for men. One drink equals 12 oz of beer, 5 oz of wine, or 1½ oz of hard liquor.  General instructions   · Keep a weight loss journal to keep track of the food you eat and how much you exercise you get.  · Take over-the-counter and prescription medicines only as told by your health care provider.  · Take vitamins and supplements only as told by your health care provider.  · Consider joining a support group. Your health care provider may be able to recommend a support group.  · Keep all follow-up visits as told by your health care provider. This is important.  Contact a health care provider if:  · You are unable to meet your weight loss goal after 6 weeks of dietary and lifestyle changes.  This information is not intended to replace advice given to you by your health care provider. Make sure you discuss any questions you have with your health care provider.  Document Released: 01/25/2006 Document Revised: 05/22/2017 Document Reviewed: 10/05/2016  [employer] Patient Education © 2017 [employer]      If you smoke or use tobacco, 4 minutes reading provided  Steps to Quit Smoking  Smoking tobacco can be harmful to your health and can affect almost every organ in your body. Smoking puts you, and those around you, at risk for developing many serious chronic diseases. Quitting smoking is difficult, but it is one of the best things that you can do for your health. It is never too late to quit.  What are the  benefits of quitting smoking?  When you quit smoking, you lower your risk of developing serious diseases and conditions, such as:  · Lung cancer or lung disease, such as COPD.  · Heart disease.  · Stroke.  · Heart attack.  · Infertility.  · Osteoporosis and bone fractures.  Additionally, symptoms such as coughing, wheezing, and shortness of breath may get better when you quit. You may also find that you get sick less often because your body is stronger at fighting off colds and infections. If you are pregnant, quitting smoking can help to reduce your chances of having a baby of low birth weight.  How do I get ready to quit?  When you decide to quit smoking, create a plan to make sure that you are successful. Before you quit:  · Pick a date to quit. Set a date within the next two weeks to give you time to prepare.  · Write down the reasons why you are quitting. Keep this list in places where you will see it often, such as on your bathroom mirror or in your car or wallet.  · Identify the people, places, things, and activities that make you want to smoke (triggers) and avoid them. Make sure to take these actions:  ¨ Throw away all cigarettes at home, at work, and in your car.  ¨ Throw away smoking accessories, such as ashtrays and lighters.  ¨ Clean your car and make sure to empty the ashtray.  ¨ Clean your home, including curtains and carpets.  · Tell your family, friends, and coworkers that you are quitting. Support from your loved ones can make quitting easier.  · Talk with your health care provider about your options for quitting smoking.  · Find out what treatment options are covered by your health insurance.  What strategies can I use to quit smoking?  Talk with your healthcare provider about different strategies to quit smoking. Some strategies include:  · Quitting smoking altogether instead of gradually lessening how much you smoke over a period of time. Research shows that quitting “cold turkey” is more  successful than gradually quitting.  · Attending in-person counseling to help you build problem-solving skills. You are more likely to have success in quitting if you attend several counseling sessions. Even short sessions of 10 minutes can be effective.  · Finding resources and support systems that can help you to quit smoking and remain smoke-free after you quit. These resources are most helpful when you use them often. They can include:  ¨ Online chats with a counselor.  ¨ Telephone quitlines.  ¨ Printed self-help materials.  ¨ Support groups or group counseling.  ¨ Text messaging programs.  ¨ Mobile phone applications.  · Taking medicines to help you quit smoking. (If you are pregnant or breastfeeding, talk with your health care provider first.) Some medicines contain nicotine and some do not. Both types of medicines help with cravings, but the medicines that include nicotine help to relieve withdrawal symptoms. Your health care provider may recommend:  ¨ Nicotine patches, gum, or lozenges.  ¨ Nicotine inhalers or sprays.  ¨ Non-nicotine medicine that is taken by mouth.  Talk with your health care provider about combining strategies, such as taking medicines while you are also receiving in-person counseling. Using these two strategies together makes you more likely to succeed in quitting than if you used either strategy on its own.  If you are pregnant or breastfeeding, talk with your health care provider about finding counseling or other support strategies to quit smoking. Do not take medicine to help you quit smoking unless told to do so by your health care provider.  What things can I do to make it easier to quit?  Quitting smoking might feel overwhelming at first, but there is a lot that you can do to make it easier. Take these important actions:  · Reach out to your family and friends and ask that they support and encourage you during this time. Call telephone quitlines, reach out to support groups, or work  with a counselor for support.  · Ask people who smoke to avoid smoking around you.  · Avoid places that trigger you to smoke, such as bars, parties, or smoke-break areas at work.  · Spend time around people who do not smoke.  · Lessen stress in your life, because stress can be a smoking trigger for some people. To lessen stress, try:  ¨ Exercising regularly.  ¨ Deep-breathing exercises.  ¨ Yoga.  ¨ Meditating.  ¨ Performing a body scan. This involves closing your eyes, scanning your body from head to toe, and noticing which parts of your body are particularly tense. Purposefully relax the muscles in those areas.  · Download or purchase mobile phone or tablet apps (applications) that can help you stick to your quit plan by providing reminders, tips, and encouragement. There are many free apps, such as QuitGuide from the CDC (Centers for Disease Control and Prevention). You can find other support for quitting smoking (smoking cessation) through smokefree.gov and other websites.  How will I feel when I quit smoking?  Within the first 24 hours of quitting smoking, you may start to feel some withdrawal symptoms. These symptoms are usually most noticeable 2–3 days after quitting, but they usually do not last beyond 2–3 weeks. Changes or symptoms that you might experience include:  · Mood swings.  · Restlessness, anxiety, or irritation.  · Difficulty concentrating.  · Dizziness.  · Strong cravings for sugary foods in addition to nicotine.  · Mild weight gain.  · Constipation.  · Nausea.  · Coughing or a sore throat.  · Changes in how your medicines work in your body.  · A depressed mood.  · Difficulty sleeping (insomnia).  After the first 2–3 weeks of quitting, you may start to notice more positive results, such as:  · Improved sense of smell and taste.  · Decreased coughing and sore throat.  · Slower heart rate.  · Lower blood pressure.  · Clearer skin.  · The ability to breathe more easily.  · Fewer sick days.  Quitting  smoking is very challenging for most people. Do not get discouraged if you are not successful the first time. Some people need to make many attempts to quit before they achieve long-term success. Do your best to stick to your quit plan, and talk with your health care provider if you have any questions or concerns.  This information is not intended to replace advice given to you by your health care provider. Make sure you discuss any questions you have with your health care provider.  Document Released: 12/12/2002 Document Revised: 08/15/2017 Document Reviewed: 05/03/2016  [employer] Interactive Patient Education © 2017 [employer]

## 2019-08-19 ENCOUNTER — HOSPITAL ENCOUNTER (OUTPATIENT)
Facility: HOSPITAL | Age: 69
Setting detail: HOSPITAL OUTPATIENT SURGERY
Discharge: HOME | End: 2019-08-19
Attending: INTERNAL MEDICINE | Admitting: INTERNAL MEDICINE

## 2019-08-19 ENCOUNTER — ANESTHESIA (OUTPATIENT)
Dept: GASTROENTEROLOGY | Facility: HOSPITAL | Age: 69
End: 2019-08-19

## 2019-08-19 ENCOUNTER — ANESTHESIA EVENT (OUTPATIENT)
Dept: GASTROENTEROLOGY | Facility: HOSPITAL | Age: 69
End: 2019-08-19

## 2019-08-19 VITALS
HEIGHT: 64 IN | BODY MASS INDEX: 27.31 KG/M2 | OXYGEN SATURATION: 94 % | HEART RATE: 75 BPM | SYSTOLIC BLOOD PRESSURE: 120 MMHG | WEIGHT: 160 LBS | DIASTOLIC BLOOD PRESSURE: 54 MMHG | TEMPERATURE: 98 F | RESPIRATION RATE: 18 BRPM

## 2019-08-19 DIAGNOSIS — K21.9 GASTROESOPHAGEAL REFLUX DISEASE WITHOUT ESOPHAGITIS: ICD-10-CM

## 2019-08-19 DIAGNOSIS — R11.0 NAUSEA: Primary | ICD-10-CM

## 2019-08-19 PROCEDURE — 88305 TISSUE EXAM BY PATHOLOGIST: CPT | Performed by: INTERNAL MEDICINE

## 2019-08-19 PROCEDURE — 25010000002 PROPOFOL 10 MG/ML EMULSION: Performed by: NURSE ANESTHETIST, CERTIFIED REGISTERED

## 2019-08-19 PROCEDURE — 43249 ESOPH EGD DILATION <30 MM: CPT | Performed by: INTERNAL MEDICINE

## 2019-08-19 PROCEDURE — 43239 EGD BIOPSY SINGLE/MULTIPLE: CPT | Performed by: INTERNAL MEDICINE

## 2019-08-19 PROCEDURE — C1726 CATH, BAL DIL, NON-VASCULAR: HCPCS | Performed by: INTERNAL MEDICINE

## 2019-08-19 RX ORDER — PANTOPRAZOLE SODIUM 40 MG/1
40 TABLET, DELAYED RELEASE ORAL DAILY
Qty: 30 TABLET | Refills: 11 | Status: SHIPPED | OUTPATIENT
Start: 2019-08-19 | End: 2019-09-16

## 2019-08-19 RX ORDER — LIDOCAINE HYDROCHLORIDE 20 MG/ML
INJECTION, SOLUTION INFILTRATION; PERINEURAL AS NEEDED
Status: DISCONTINUED | OUTPATIENT
Start: 2019-08-19 | End: 2019-08-19 | Stop reason: SURG

## 2019-08-19 RX ORDER — SODIUM CHLORIDE 0.9 % (FLUSH) 0.9 %
3-10 SYRINGE (ML) INJECTION AS NEEDED
Status: CANCELLED | OUTPATIENT
Start: 2019-08-19

## 2019-08-19 RX ORDER — SODIUM CHLORIDE 0.9 % (FLUSH) 0.9 %
3 SYRINGE (ML) INJECTION EVERY 12 HOURS SCHEDULED
Status: CANCELLED | OUTPATIENT
Start: 2019-08-19

## 2019-08-19 RX ORDER — SODIUM CHLORIDE 9 MG/ML
100 INJECTION, SOLUTION INTRAVENOUS CONTINUOUS
Status: CANCELLED | OUTPATIENT
Start: 2019-08-19

## 2019-08-19 RX ORDER — SODIUM CHLORIDE 9 MG/ML
500 INJECTION, SOLUTION INTRAVENOUS CONTINUOUS PRN
Status: DISCONTINUED | OUTPATIENT
Start: 2019-08-19 | End: 2019-08-19 | Stop reason: HOSPADM

## 2019-08-19 RX ORDER — PROPOFOL 10 MG/ML
VIAL (ML) INTRAVENOUS AS NEEDED
Status: DISCONTINUED | OUTPATIENT
Start: 2019-08-19 | End: 2019-08-19 | Stop reason: SURG

## 2019-08-19 RX ORDER — SODIUM CHLORIDE 0.9 % (FLUSH) 0.9 %
3 SYRINGE (ML) INJECTION AS NEEDED
Status: DISCONTINUED | OUTPATIENT
Start: 2019-08-19 | End: 2019-08-19 | Stop reason: HOSPADM

## 2019-08-19 RX ADMIN — PROPOFOL 50 MG: 10 INJECTION, EMULSION INTRAVENOUS at 12:06

## 2019-08-19 RX ADMIN — PROPOFOL 80 MG: 10 INJECTION, EMULSION INTRAVENOUS at 12:00

## 2019-08-19 RX ADMIN — LIDOCAINE HYDROCHLORIDE 50 MG: 20 INJECTION, SOLUTION INFILTRATION; PERINEURAL at 11:57

## 2019-08-19 RX ADMIN — PROPOFOL 70 MG: 10 INJECTION, EMULSION INTRAVENOUS at 11:58

## 2019-08-19 RX ADMIN — SODIUM CHLORIDE 500 ML: 9 INJECTION, SOLUTION INTRAVENOUS at 10:06

## 2019-08-19 NOTE — DISCHARGE INSTRUCTIONS
SCHEDULING WILL CALL PATIENT TO ARRANGE FOR A GASTRIC EMPTYING SCAN. IF YOU DO NOT HEAR FROM SCHEDULING WITHIN 48 PLEASE CALL [phone] OPT[facility] 3

## 2019-08-19 NOTE — ANESTHESIA POSTPROCEDURE EVALUATION
Patient: Liudmila Ga    Procedure Summary     Date:  08/19/19 Room / Location:  [facility] ENDOSCOPY 2 / [facility] END[facility]    Anesthesia Start:  1154 Anesthesia Stop:  1211    Procedure:  ESOPHAGOGASTRODUODENOSCOPY WITH ANESTHESIA (N/A ) Diagnosis:       Gastroesophageal reflux disease without esophagitis      (Gastroesophageal reflux disease without esophagitis [K21.9])    Surgeon:  Carin Huang MD Provider:  Papo Jaimes CRNA    Anesthesia Type:  MAC ASA Status:  2          Anesthesia Type: MAC  Last vitals  BP   129/71 (08/19/19 0948)   Temp   98 °F (36.7 °C) (08/19/19 0948)   Pulse   75 (08/19/19 0948)   Resp   20 (08/19/19 0948)     SpO2   93 % (08/19/19 0948)     Post Anesthesia Care and Evaluation    Patient location during evaluation: PHASE II  Patient participation: complete - patient participated  Level of consciousness: awake  Pain score: 0  Pain management: adequate  Airway patency: patent  Anesthetic complications: No anesthetic complications  PONV Status: none  Cardiovascular status: acceptable  Respiratory status: acceptable  Hydration status: acceptable

## 2019-08-19 NOTE — ANESTHESIA PREPROCEDURE EVALUATION
Anesthesia Evaluation     no history of anesthetic complications:  NPO Solid Status: > 8 hours  NPO Liquid Status: > 8 hours           Airway   Mallampati: III  TM distance: >3 FB  Neck ROM: full  Dental - normal exam         Pulmonary - normal exam    breath sounds clear to auscultation  (-) asthma, recent URI, sleep apnea, not a smoker  Cardiovascular - normal exam  Exercise tolerance: good (4-7 METS)    Patient on routine beta blocker and Beta blocker given within 24 hours of surgery  Rhythm: regular  Rate: normal    (+) dysrhythmias (treated with atenolol) Tachycardia,   (-) pacemaker, hypertension, past MI, angina, cardiac stents, CABG      Neuro/Psych  (-) seizures, TIA, CVA  GI/Hepatic/Renal/Endo    (+)  hiatal hernia, GERD,  hypothyroidism,   (-) liver disease, no renal disease, diabetes, hyperthyroidism    Musculoskeletal     Abdominal    Substance History      OB/GYN          Other                        Anesthesia Plan    ASA 2     MAC     intravenous induction   Anesthetic plan, all risks, benefits, and alternatives have been provided, discussed and informed consent has been obtained with: patient.

## 2019-08-20 LAB
CYTO UR: NORMAL
LAB AP CASE REPORT: NORMAL
LAB AP CLINICAL INFORMATION: NORMAL
PATH REPORT.FINAL DX SPEC: NORMAL
PATH REPORT.GROSS SPEC: NORMAL

## 2019-08-28 ENCOUNTER — HOSPITAL ENCOUNTER (OUTPATIENT)
Dept: NUCLEAR MEDICINE | Facility: HOSPITAL | Age: 69
Discharge: HOME | End: 2019-08-28

## 2019-08-28 DIAGNOSIS — R11.0 NAUSEA: ICD-10-CM

## 2019-08-28 PROCEDURE — 0 TECHNETIUM SULFUR COLLOID: Performed by: INTERNAL MEDICINE

## 2019-08-28 PROCEDURE — 78264 GASTRIC EMPTYING IMG STUDY: CPT

## 2019-08-28 PROCEDURE — A9541 TC99M SULFUR COLLOID: HCPCS | Performed by: INTERNAL MEDICINE

## 2019-08-28 RX ADMIN — TECHNETIUM TC 99M SULFUR COLLOID 1 DOSE: KIT at 08:10

## 2019-09-16 ENCOUNTER — OFFICE VISIT (OUTPATIENT)
Dept: GASTROENTEROLOGY | Facility: CLINIC | Age: 69
End: 2019-09-16

## 2019-09-16 VITALS
HEART RATE: 70 BPM | OXYGEN SATURATION: 96 % | DIASTOLIC BLOOD PRESSURE: 72 MMHG | HEIGHT: 64 IN | BODY MASS INDEX: 26.46 KG/M2 | WEIGHT: 155 LBS | SYSTOLIC BLOOD PRESSURE: 122 MMHG

## 2019-09-16 DIAGNOSIS — E66.9 OBESITY, UNSPECIFIED OBESITY SEVERITY, UNSPECIFIED OBESITY TYPE: ICD-10-CM

## 2019-09-16 DIAGNOSIS — R11.0 NAUSEA: ICD-10-CM

## 2019-09-16 DIAGNOSIS — Z78.9 NONSMOKER: ICD-10-CM

## 2019-09-16 DIAGNOSIS — R13.19 ESOPHAGEAL DYSPHAGIA: ICD-10-CM

## 2019-09-16 DIAGNOSIS — K21.9 GASTROESOPHAGEAL REFLUX DISEASE WITHOUT ESOPHAGITIS: Primary | ICD-10-CM

## 2019-09-16 PROCEDURE — 99214 OFFICE O/P EST MOD 30 MIN: CPT | Performed by: CLINICAL NURSE SPECIALIST

## 2019-09-16 RX ORDER — OMEPRAZOLE 20 MG/1
20 CAPSULE, DELAYED RELEASE ORAL 2 TIMES DAILY
COMMUNITY
End: 2020-07-07

## 2019-09-16 NOTE — PROGRESS NOTES
Liudmila Ga  1950 9/20/2019  Chief Complaint   Patient presents with   • GI Problem     Still having some nausea and belching and some epigastric pain         HPI    Liudmila Ga is a  68 y.o. female here for a follow up visit for progressive ongoing reflux nausea increased belching.  She was last seen by me on 8/6/2019 she was set up for endoscopy this was performed by Dr. Carin Huang on 8/19/2019.  Small hiatal hernia nonobstructing Schatzki's ring which was dilated, a medium amount of food residue in the stomach was noted.  Normal examined duodenum and no specimens collected a gastric emptying scan was arranged post procedure and Dr. Huang recommended repeat endoscopy and continued PPI therapy.  Gastric emptying study normal solid-phase gastric emptying 8/28/2019 she has persistent symptoms especially with certain foods such as salads.  She has some intermittent nausea good days and bad days symptoms seem to come and go.  No fever chills sweats no change in bowels she is up-to-date with her colonoscopy.    Past Medical History:   Diagnosis Date   • Arthritis    • Colon polyps    • Disease of thyroid gland    • GERD (gastroesophageal reflux disease)    • Hiatal hernia    • Osteopenia    • Overactive bladder    • Vitamin D deficiency      Past Surgical History:   Procedure Laterality Date   • CHOLECYSTECTOMY  2004   • COLONOSCOPY  08/20/2014    HYPERPLASTIC POLYP ILEOCECAL VALVE AND HEPATIC FLEXURE, DIVERTICULA IN SIGMOID COLON   • COLONOSCOPY  02/04/2014    TUBULAR ADENOMA CECAL POLYP, TUBULAR ADENOMA RECTAL POLYP, HYPERPLASTICS POLYPS AT ILEOCECAL VALVE AND RECTAL.   • COLONOSCOPY N/A 12/11/2017    2 Sessile serrated adenomas large intestine and hepatic flexure, 2 tubular adenomas cecum and at 60 cm  repeat exam in 3 years   • ENDOSCOPY  12/22/2015    HIATAL HERNIA, MILD SCHATZKI RING DILATED   • ENDOSCOPY N/A 11/7/2017    HH, dilated otherwise normal exam   • ENDOSCOPY N/A 8/19/2019     Small HH, NOn-obstructing Schatzki ring dilated, food residue in stomach   • HYSTERECTOMY  2011    TOTAL   • TONSILLECTOMY         Outpatient Medications Marked as Taking for the 19 encounter (Office Visit) with Allegra Houser APRN   Medication Sig Dispense Refill   • atenolol (TENORMIN) 25 MG tablet Take 25 mg by mouth Daily.     • Biotin 36930 MCG tablet Take  by mouth.     • bisacodyl (DULCOLAX) 5 MG EC tablet Take 5 mg by mouth Daily As Needed for Constipation.     • Cholecalciferol (VITAMIN D3) 2000 units tablet Take  by mouth.     • diclofenac (VOLTAREN) 75 MG EC tablet      • DULoxetine (CYMBALTA) 30 MG capsule Take 30 mg by mouth Daily.     • fluticasone (FLONASE) 50 MCG/ACT nasal spray 2 sprays into each nostril Daily.     • Omega-3 Fatty Acids (FISH OIL) 1200 MG capsule capsule Take 1,200 mg by mouth Daily.     • omeprazole (priLOSEC) 20 MG capsule Take 20 mg by mouth 2 (Two) Times a Day.     • SYNTHROID 25 MCG tablet      • vitamin C (ASCORBIC ACID) 500 MG tablet Take 1,000 mg by mouth Daily.     • Zinc 50 MG capsule Take  by mouth.     • zolpidem (AMBIEN) 10 MG tablet Take 10 mg by mouth At Night As Needed for Sleep.         Allergies   Allergen Reactions   • Lortab [Hydrocodone-Acetaminophen] Itching   • Sulfa Antibiotics Unknown (See Comments)     unknown       Social History     Socioeconomic History   • Marital status:      Spouse name: Not on file   • Number of children: Not on file   • Years of education: Not on file   • Highest education level: Not on file   Tobacco Use   • Smoking status: Former Smoker     Last attempt to quit: 2012     Years since quittin.7   • Smokeless tobacco: Never Used   Substance and Sexual Activity   • Alcohol use: Yes     Comment: nightly   • Drug use: No   • Sexual activity: No       Family History   Problem Relation Age of Onset   • Liver cancer Mother    • Colon cancer Brother    • Colon polyps Brother    • Breast cancer Neg Hx    •  "Ovarian cancer Neg Hx        Review of Systems   Constitutional: Negative for activity change, appetite change, chills, diaphoresis, fatigue, fever and unexpected weight change.   HENT: Negative for ear pain, hearing loss, mouth sores, sore throat, trouble swallowing and voice change.    Eyes: Negative.    Respiratory: Negative for cough, choking, shortness of breath and wheezing.    Cardiovascular: Negative for chest pain and palpitations.   Gastrointestinal: Positive for nausea. Negative for abdominal pain, blood in stool, constipation, diarrhea and vomiting.   Endocrine: Negative for cold intolerance and heat intolerance.   Genitourinary: Negative for decreased urine volume, dysuria, frequency, hematuria and urgency.   Musculoskeletal: Negative for back pain, gait problem and myalgias.   Skin: Negative for color change, pallor and rash.   Allergic/Immunologic: Negative for food allergies and immunocompromised state.   Neurological: Negative for dizziness, tremors, seizures, syncope, weakness, light-headedness, numbness and headaches.   Hematological: Negative for adenopathy. Does not bruise/bleed easily.   Psychiatric/Behavioral: Negative for agitation and confusion. The patient is not nervous/anxious.    All other systems reviewed and are negative.      /72   Pulse 70   Ht 162.6 cm (64\")   Wt 70.3 kg (155 lb)   SpO2 96%   Breastfeeding? No   BMI 26.61 kg/m²   Body mass index is 26.61 kg/m².    Physical Exam   Constitutional: She is oriented to person, place, and time. She appears well-developed and well-nourished.   HENT:   Head: Normocephalic and atraumatic.   Eyes: Pupils are equal, round, and reactive to light.   Neck: Normal range of motion. Neck supple. No tracheal deviation present.   Cardiovascular: Normal rate, regular rhythm and normal heart sounds. Exam reveals no gallop and no friction rub.   No murmur heard.  Pulmonary/Chest: Effort normal and breath sounds normal. No respiratory " distress. She has no wheezes. She has no rales. She exhibits no tenderness.   Abdominal: Soft. Bowel sounds are normal. She exhibits no distension. There is no hepatosplenomegaly. There is no tenderness. There is no rigidity, no rebound and no guarding.   Musculoskeletal: Normal range of motion. She exhibits no edema, tenderness or deformity.   Neurological: She is alert and oriented to person, place, and time. She has normal reflexes.   Skin: Skin is warm and dry. No rash noted. No pallor.   Psychiatric: She has a normal mood and affect. Her behavior is normal. Judgment and thought content normal.       ASSESSMENT AND PLAN    Patient's Body mass index is 26.61 kg/m². BMI is above normal parameters. Recommendations include: nutrition counseling.    Liudmila was seen today for gi problem.    Diagnoses and all orders for this visit:    Gastroesophageal reflux disease without esophagitis  -     Case Request; Standing  -     Follow Anesthesia Guidelines / Standing Orders; Future  -     Obtain Informed Consent; Future  -     Implement Anesthesia Orders Day of Procedure; Standing  -     Obtain Informed Consent; Standing  -     Case Request    Nausea    Esophageal dysphagia    Nonsmoker    Obesity, unspecified obesity severity, unspecified obesity type    I have once again expressed the need for smaller more frequent meals taking her time eating as well as consuming easily digested food avoiding salad since he seemed to give her difficulties she is to continue PPI therapy further recommendations to follow  Reviewed endoscopy by Dr. Huang and Dr. Huang's recommendation was repeat procedure to look at the stomach given she had a medium amount of food residue in the stomach on her exam 8/19/2019.  She is aware and agreeable risk benefits indications alternatives discussed  ESOPHAGOGASTRODUODENOSCOPY WITH ANESTHESIA (N/A)    There are no Patient Instructions on file for this visit.  Allegra Houser, APRN  11:34  AM  9/20/2019    Obesity, Adult  Obesity is the condition of having too much total body fat. Being overweight or obese means that your weight is greater than what is considered healthy for your body size. Obesity is determined by a measurement called BMI. BMI is an estimate of body fat and is calculated from height and weight. For adults, a BMI of 30 or higher is considered obese.  Obesity can eventually lead to other health concerns and major illnesses, including:  · Stroke.  · Coronary artery disease (CAD).  · Type 2 diabetes.  · Some types of cancer, including cancers of the colon, breast, uterus, and gallbladder.  · Osteoarthritis.  · High blood pressure (hypertension).  · High cholesterol.  · Sleep apnea.  · Gallbladder stones.  · Infertility problems.  What are the causes?  The main cause of obesity is taking in (consuming) more calories than your body uses for energy. Other factors that contribute to this condition may include:  · Being born with genes that make you more likely to become obese.  · Having a medical condition that causes obesity. These conditions include:  ¨ Hypothyroidism.  ¨ Polycystic ovarian syndrome (PCOS).  ¨ Binge-eating disorder.  ¨ Cushing syndrome.  · Taking certain medicines, such as steroids, antidepressants, and seizure medicines.  · Not being physically active (sedentary lifestyle).  · Living where there are limited places to exercise safely or buy healthy foods.  · Not getting enough sleep.  What increases the risk?  The following factors may increase your risk of this condition:  · Having a family history of obesity.  · Being a woman of -American descent.  · Being a man of  descent.  What are the signs or symptoms?  Having excessive body fat is the main symptom of this condition.  How is this diagnosed?  This condition may be diagnosed based on:  · Your symptoms.  · Your medical history.  · A physical exam. Your health care provider may measure:  ¨ Your BMI. If  you are an adult with a BMI between 25 and less than 30, you are considered overweight. If you are an adult with a BMI of 30 or higher, you are considered obese.  ¨ The distances around your hips and your waist (circumferences). These may be compared to each other to help diagnose your condition.  ¨ Your skinfold thickness. Your health care provider may gently pinch a fold of your skin and measure it.  How is this treated?  Treatment for this condition often includes changing your lifestyle. Treatment may include some or all of the following:  · Dietary changes. Work with your health care provider and a dietitian to set a weight-loss goal that is healthy and reasonable for you. Dietary changes may include eating:  ¨ Smaller portions. A portion size is the amount of a particular food that is healthy for you to eat at one time. This varies from person to person.  ¨ Low-calorie or low-fat options.  ¨ More whole grains, fruits, and vegetables.  · Regular physical activity. This may include aerobic activity (cardio) and strength training.  · Medicine to help you lose weight. Your health care provider may prescribe medicine if you are unable to lose 1 pound a week after 6 weeks of eating more healthily and doing more physical activity.  · Surgery. Surgical options may include gastric banding and gastric bypass. Surgery may be done if:  ¨ Other treatments have not helped to improve your condition.  ¨ You have a BMI of 40 or higher.  ¨ You have life-threatening health problems related to obesity.  Follow these instructions at home:     Eating and drinking     · Follow recommendations from your health care provider about what you eat and drink. Your health care provider may advise you to:  ¨ Limit fast foods, sweets, and processed snack foods.  ¨ Choose low-fat options, such as low-fat milk instead of whole milk.  ¨ Eat 5 or more servings of fruits or vegetables every day.  ¨ Eat at home more often. This gives you more  control over what you eat.  ¨ Choose healthy foods when you eat out.  ¨ Learn what a healthy portion size is.  ¨ Keep low-fat snacks on hand.  ¨ Avoid sugary drinks, such as soda, fruit juice, iced tea sweetened with sugar, and flavored milk.  ¨ Eat a healthy breakfast.  · Drink enough water to keep your urine clear or pale yellow.  · Do not go without eating for long periods of time (do not fast) or follow a fad diet. Fasting and fad diets can be unhealthy and even dangerous.  Physical Activity   · Exercise regularly, as told by your health care provider. Ask your health care provider what types of exercise are safe for you and how often you should exercise.  · Warm up and stretch before being active.  · Cool down and stretch after being active.  · Rest between periods of activity.  Lifestyle   · Limit the time that you spend in front of your TV, computer, or video game system.  · Find ways to reward yourself that do not involve food.  · Limit alcohol intake to no more than 1 drink a day for nonpregnant women and 2 drinks a day for men. One drink equals 12 oz of beer, 5 oz of wine, or 1½ oz of hard liquor.  General instructions   · Keep a weight loss journal to keep track of the food you eat and how much you exercise you get.  · Take over-the-counter and prescription medicines only as told by your health care provider.  · Take vitamins and supplements only as told by your health care provider.  · Consider joining a support group. Your health care provider may be able to recommend a support group.  · Keep all follow-up visits as told by your health care provider. This is important.  Contact a health care provider if:  · You are unable to meet your weight loss goal after 6 weeks of dietary and lifestyle changes.  This information is not intended to replace advice given to you by your health care provider. Make sure you discuss any questions you have with your health care provider.  Document Released: 01/25/2006  Document Revised: 05/22/2017 Document Reviewed: 10/05/2016  [employer] Patient Education © 2017 [employer]      IF YOU SMOKE OR USE TOBACCO PLEASE READ THE FOLLOWING:    Why is smoking bad for me?  Smoking increases the risk of heart disease, lung disease, vascular disease, stroke, and cancer.     If you smoke, STOP!    If you would like more information on quitting smoking, please visit the [employer] website: www.[place]/[employer]/healthier-together/smoke   This link will provide additional resources including the QUIT line and the [employer] the [employer] support groups.     For more information:    Quit Now [place]  1-800-QUIT-NOW  https://[email or web address].quitlogix.org/en-[place]/

## 2019-09-20 ENCOUNTER — TELEPHONE (OUTPATIENT)
Dept: GASTROENTEROLOGY | Facility: CLINIC | Age: 69
End: 2019-09-20

## 2019-09-20 NOTE — TELEPHONE ENCOUNTER
Temo,  Get her scheduled for a repeat Endoscopy as suggested by Dr Huang please. Pt is aware that you are going to be calling and case request is in.   Thanks kristin

## 2019-10-03 ENCOUNTER — ANESTHESIA EVENT (OUTPATIENT)
Dept: GASTROENTEROLOGY | Facility: HOSPITAL | Age: 69
End: 2019-10-03

## 2019-10-03 ENCOUNTER — ANESTHESIA (OUTPATIENT)
Dept: GASTROENTEROLOGY | Facility: HOSPITAL | Age: 69
End: 2019-10-03

## 2019-10-03 ENCOUNTER — HOSPITAL ENCOUNTER (OUTPATIENT)
Facility: HOSPITAL | Age: 69
Setting detail: HOSPITAL OUTPATIENT SURGERY
Discharge: HOME | End: 2019-10-03
Attending: INTERNAL MEDICINE | Admitting: INTERNAL MEDICINE

## 2019-10-03 VITALS
SYSTOLIC BLOOD PRESSURE: 110 MMHG | BODY MASS INDEX: 27.14 KG/M2 | RESPIRATION RATE: 20 BRPM | WEIGHT: 159 LBS | OXYGEN SATURATION: 99 % | HEART RATE: 64 BPM | HEIGHT: 64 IN | DIASTOLIC BLOOD PRESSURE: 45 MMHG | TEMPERATURE: 97.2 F

## 2019-10-03 DIAGNOSIS — K21.9 GASTROESOPHAGEAL REFLUX DISEASE WITHOUT ESOPHAGITIS: ICD-10-CM

## 2019-10-03 PROCEDURE — 25010000002 PROPOFOL 10 MG/ML EMULSION: Performed by: NURSE ANESTHETIST, CERTIFIED REGISTERED

## 2019-10-03 PROCEDURE — 43239 EGD BIOPSY SINGLE/MULTIPLE: CPT | Performed by: INTERNAL MEDICINE

## 2019-10-03 PROCEDURE — 87081 CULTURE SCREEN ONLY: CPT | Performed by: INTERNAL MEDICINE

## 2019-10-03 RX ORDER — SODIUM CHLORIDE 0.9 % (FLUSH) 0.9 %
3-10 SYRINGE (ML) INJECTION AS NEEDED
Status: CANCELLED | OUTPATIENT
Start: 2019-10-03

## 2019-10-03 RX ORDER — SODIUM CHLORIDE 0.9 % (FLUSH) 0.9 %
10 SYRINGE (ML) INJECTION AS NEEDED
Status: DISCONTINUED | OUTPATIENT
Start: 2019-10-03 | End: 2019-10-03 | Stop reason: HOSPADM

## 2019-10-03 RX ORDER — SODIUM CHLORIDE 0.9 % (FLUSH) 0.9 %
3 SYRINGE (ML) INJECTION EVERY 12 HOURS SCHEDULED
Status: CANCELLED | OUTPATIENT
Start: 2019-10-03

## 2019-10-03 RX ORDER — CETIRIZINE HYDROCHLORIDE 10 MG/1
10 TABLET ORAL DAILY
COMMUNITY

## 2019-10-03 RX ORDER — SODIUM CHLORIDE 9 MG/ML
500 INJECTION, SOLUTION INTRAVENOUS CONTINUOUS PRN
Status: DISCONTINUED | OUTPATIENT
Start: 2019-10-03 | End: 2019-10-03 | Stop reason: HOSPADM

## 2019-10-03 RX ORDER — PROPOFOL 10 MG/ML
VIAL (ML) INTRAVENOUS AS NEEDED
Status: DISCONTINUED | OUTPATIENT
Start: 2019-10-03 | End: 2019-10-03 | Stop reason: SURG

## 2019-10-03 RX ORDER — SODIUM CHLORIDE 9 MG/ML
100 INJECTION, SOLUTION INTRAVENOUS CONTINUOUS
Status: CANCELLED | OUTPATIENT
Start: 2019-10-03

## 2019-10-03 RX ADMIN — LIDOCAINE HYDROCHLORIDE 100 MG: 20 INJECTION, SOLUTION INTRAVENOUS at 08:10

## 2019-10-03 RX ADMIN — PROPOFOL 150 MG: 10 INJECTION, EMULSION INTRAVENOUS at 08:10

## 2019-10-03 RX ADMIN — SODIUM CHLORIDE 500 ML: 9 INJECTION, SOLUTION INTRAVENOUS at 07:17

## 2019-10-03 NOTE — ANESTHESIA PREPROCEDURE EVALUATION
Anesthesia Evaluation     no history of anesthetic complications:  NPO Solid Status: > 8 hours  NPO Liquid Status: > 8 hours           Airway   Mallampati: III  TM distance: <3 FB  Neck ROM: full  Dental          Pulmonary - normal exam    breath sounds clear to auscultation  (+) a smoker (quit 2012) Former,   (-) asthma, recent URI, sleep apnea  Cardiovascular - normal exam  Exercise tolerance: good (4-7 METS)    Rhythm: regular  Rate: normal    (+) dysrhythmias (No issues since taking atenolol) Tachycardia,   (-) pacemaker, hypertension, past MI, angina, cardiac stents      Neuro/Psych  (-) seizures, TIA, CVA  GI/Hepatic/Renal/Endo    (+) obesity,  GERD,  hypothyroidism,   (-) liver disease, no renal disease, diabetes, hyperthyroidism    Musculoskeletal     Abdominal    Substance History      OB/GYN          Other                      Anesthesia Plan    ASA 3     MAC     intravenous induction   Anesthetic plan, all risks, benefits, and alternatives have been provided, discussed and informed consent has been obtained with: patient.

## 2019-10-03 NOTE — ANESTHESIA POSTPROCEDURE EVALUATION
"Patient: Liudmila Ga    Procedure Summary     Date:  10/03/19 Room / Location:  [facility] ENDOSCOPY 4 / [facility]    Anesthesia Start:  0808 Anesthesia Stop:  0819    Procedure:  ESOPHAGOGASTRODUODENOSCOPY WITH ANESTHESIA (N/A ) Diagnosis:       Gastroesophageal reflux disease without esophagitis      (Gastroesophageal reflux disease without esophagitis [K21.9])    Surgeon:  Stevan Mullins MD Provider:  Milly Zhao CRNA    Anesthesia Type:  MAC ASA Status:  3          Anesthesia Type: MAC  Last vitals  BP   109/50 (10/03/19 0819)   Temp   97.2 °F (36.2 °C) (10/03/19 0654)   Pulse   64 (10/03/19 0819)   Resp   20 (10/03/19 0819)     SpO2   94 % (10/03/19 0819)     Post Anesthesia Care and Evaluation    Patient location during evaluation: PACU  Patient participation: complete - patient participated  Level of consciousness: awake and alert  Pain management: adequate  Airway patency: patent  Anesthetic complications: No anesthetic complications    Cardiovascular status: acceptable  Respiratory status: acceptable  Hydration status: acceptable    Comments: Blood pressure 109/50, pulse 64, temperature 97.2 °F (36.2 °C), temperature source Temporal, resp. rate 20, height 162.6 cm (64\"), weight 72.1 kg (159 lb), SpO2 94 %, not currently breastfeeding.    Pt discharged from PACU based on melvi score >8      "

## 2019-10-04 LAB — UREASE TISS QL: NEGATIVE

## 2020-07-07 ENCOUNTER — OFFICE VISIT (OUTPATIENT)
Dept: GASTROENTEROLOGY | Facility: CLINIC | Age: 70
End: 2020-07-07

## 2020-07-07 VITALS
RESPIRATION RATE: 16 BRPM | HEART RATE: 76 BPM | HEIGHT: 64 IN | BODY MASS INDEX: 25.95 KG/M2 | SYSTOLIC BLOOD PRESSURE: 128 MMHG | WEIGHT: 152 LBS | DIASTOLIC BLOOD PRESSURE: 78 MMHG

## 2020-07-07 DIAGNOSIS — E66.9 OBESITY, UNSPECIFIED OBESITY SEVERITY, UNSPECIFIED OBESITY TYPE: ICD-10-CM

## 2020-07-07 DIAGNOSIS — Z86.010 HX OF ADENOMATOUS COLONIC POLYPS: Primary | ICD-10-CM

## 2020-07-07 DIAGNOSIS — K21.9 GASTROESOPHAGEAL REFLUX DISEASE WITHOUT ESOPHAGITIS: ICD-10-CM

## 2020-07-07 DIAGNOSIS — Z78.9 NONSMOKER: ICD-10-CM

## 2020-07-07 DIAGNOSIS — R11.0 NAUSEA: ICD-10-CM

## 2020-07-07 PROCEDURE — 99213 OFFICE O/P EST LOW 20 MIN: CPT | Performed by: CLINICAL NURSE SPECIALIST

## 2020-07-07 RX ORDER — PANTOPRAZOLE SODIUM 40 MG/1
40 TABLET, DELAYED RELEASE ORAL DAILY
COMMUNITY

## 2020-07-07 RX ORDER — SODIUM, POTASSIUM,MAG SULFATES 17.5-3.13G
SOLUTION, RECONSTITUTED, ORAL ORAL
Qty: 2 BOTTLE | Refills: 0 | Status: SHIPPED | OUTPATIENT
Start: 2020-07-07 | End: 2022-05-02

## 2020-07-07 NOTE — H&P (VIEW-ONLY)
Liudmila Ga  1950 7/7/2020  Chief Complaint   Patient presents with   • GI Problem     Vomiting     Subjective   HPI  Liudmila Ga is a 69 y.o. female who presents with a GERD and nausea. This is ongoing persistent for her for years. Stable at this time. She is on PPI therapy and managing. No dysphagia. She is s/p endo last year and gastric emptying study was normal.   Positive family hx for colon cancer in her brother. Hx of adenomatous polyp with her last colonoscopy. No change in bowels. No rectal bleeding.    Past Medical History:   Diagnosis Date   • Arthritis    • Colon polyps    • Disease of thyroid gland    • GERD (gastroesophageal reflux disease)    • Hiatal hernia    • Osteopenia    • Overactive bladder    • Vitamin D deficiency      Past Surgical History:   Procedure Laterality Date   • CHOLECYSTECTOMY  2004   • COLONOSCOPY  08/20/2014    HYPERPLASTIC POLYP ILEOCECAL VALVE AND HEPATIC FLEXURE, DIVERTICULA IN SIGMOID COLON   • COLONOSCOPY  02/04/2014    TUBULAR ADENOMA CECAL POLYP, TUBULAR ADENOMA RECTAL POLYP, HYPERPLASTICS POLYPS AT ILEOCECAL VALVE AND RECTAL.   • COLONOSCOPY N/A 12/11/2017    2 Sessile serrated adenomas large intestine and hepatic flexure, 2 tubular adenomas cecum and at 60 cm  repeat exam in 3 years   • ENDOSCOPY  12/22/2015    HIATAL HERNIA, MILD SCHATZKI RING DILATED   • ENDOSCOPY N/A 11/7/2017    HH, dilated otherwise normal exam   • ENDOSCOPY N/A 8/19/2019    Small HH, NOn-obstructing Schatzki ring dilated, food residue in stomach   • ENDOSCOPY N/A 10/3/2019    Gastritis otherwise normal exam   • HYSTERECTOMY  06/2011    TOTAL   • TONSILLECTOMY         Outpatient Medications Marked as Taking for the 7/7/20 encounter (Office Visit) with Allegra Houser APRN   Medication Sig Dispense Refill   • atenolol (TENORMIN) 25 MG tablet Take 25 mg by mouth Daily.     • Biotin 61179 MCG tablet Take  by mouth.     • bisacodyl (DULCOLAX) 5 MG EC tablet Take 5 mg  by mouth Daily As Needed for Constipation.     • cetirizine (zyrTEC) 10 MG tablet Take 10 mg by mouth Daily.     • Cholecalciferol (VITAMIN D3) 2000 units tablet Take  by mouth.     • diclofenac (VOLTAREN) 75 MG EC tablet      • DULoxetine (CYMBALTA) 30 MG capsule Take 30 mg by mouth Daily.     • fluticasone (FLONASE) 50 MCG/ACT nasal spray 2 sprays into each nostril Daily.     • Lysine 500 MG capsule Take 500 mg by mouth.     • Omega-3 Fatty Acids (FISH OIL) 1200 MG capsule capsule Take 1,200 mg by mouth Daily.     • pantoprazole (PROTONIX) 40 MG EC tablet Take 40 mg by mouth Daily.     • SYNTHROID 25 MCG tablet      • vitamin C (ASCORBIC ACID) 500 MG tablet Take 1,000 mg by mouth Daily.     • Zinc 50 MG capsule Take  by mouth.     • zolpidem (AMBIEN) 10 MG tablet Take 10 mg by mouth At Night As Needed for Sleep.       Allergies   Allergen Reactions   • Lortab [Hydrocodone-Acetaminophen] Itching   • Sulfa Antibiotics Unknown (See Comments)     unknown     Social History     Socioeconomic History   • Marital status:      Spouse name: Not on file   • Number of children: Not on file   • Years of education: Not on file   • Highest education level: Not on file   Tobacco Use   • Smoking status: Former Smoker     Last attempt to quit: 2012     Years since quittin.5   • Smokeless tobacco: Never Used   Substance and Sexual Activity   • Alcohol use: Yes     Comment: nightly   • Drug use: No   • Sexual activity: Never     Family History   Problem Relation Age of Onset   • Liver cancer Mother    • Colon cancer Brother    • Colon polyps Brother    • Breast cancer Neg Hx    • Ovarian cancer Neg Hx      Health Maintenance   Topic Date Due   • TDAP/TD VACCINES (1 - Tdap) 1959   • ZOSTER VACCINE (2 of 3) 2009   • Pneumococcal Vaccine Once at 65 Years Old  2013   • HEPATITIS C SCREENING  2017   • MEDICARE ANNUAL WELLNESS  2017   • COLOGUARD  2017   • DXA SCAN  2018   •  "MAMMOGRAM  06/19/2020   • INFLUENZA VACCINE  08/01/2020     Review of Systems   Constitutional: Negative for activity change, appetite change, chills, diaphoresis, fatigue, fever and unexpected weight change.   HENT: Negative for ear pain, hearing loss, mouth sores, sore throat, trouble swallowing and voice change.    Eyes: Negative.    Respiratory: Negative for cough, choking, shortness of breath and wheezing.    Cardiovascular: Negative for chest pain and palpitations.   Gastrointestinal: Negative for abdominal pain, blood in stool, constipation, diarrhea, nausea and vomiting.   Endocrine: Negative for cold intolerance and heat intolerance.   Genitourinary: Negative for decreased urine volume, dysuria, frequency, hematuria and urgency.   Musculoskeletal: Negative for back pain, gait problem and myalgias.   Skin: Negative for color change, pallor and rash.   Allergic/Immunologic: Negative for food allergies and immunocompromised state.   Neurological: Negative for dizziness, tremors, seizures, syncope, weakness, light-headedness, numbness and headaches.   Hematological: Negative for adenopathy. Does not bruise/bleed easily.   Psychiatric/Behavioral: Negative for agitation and confusion. The patient is not nervous/anxious.    All other systems reviewed and are negative.    Objective   Vitals:    07/07/20 1241   BP: 128/78   Pulse: 76   Resp: 16   Weight: 68.9 kg (152 lb)   Height: 162.6 cm (64\")     Body mass index is 26.09 kg/m².  Physical Exam   Constitutional: She is oriented to person, place, and time. She appears well-developed and well-nourished.   HENT:   Head: Normocephalic and atraumatic.   Eyes: Pupils are equal, round, and reactive to light.   Neck: Normal range of motion. Neck supple. No tracheal deviation present.   Cardiovascular: Normal rate, regular rhythm and normal heart sounds. Exam reveals no gallop and no friction rub.   No murmur heard.  Pulmonary/Chest: Effort normal and breath sounds normal. " No respiratory distress. She has no wheezes. She has no rales. She exhibits no tenderness.   Abdominal: Soft. Bowel sounds are normal. She exhibits no distension. There is no hepatosplenomegaly. There is no tenderness. There is no rigidity, no rebound and no guarding.   Musculoskeletal: Normal range of motion. She exhibits no edema, tenderness or deformity.   Neurological: She is alert and oriented to person, place, and time. She has normal reflexes.   Skin: Skin is warm and dry. No rash noted. No pallor.   Psychiatric: She has a normal mood and affect. Her behavior is normal. Judgment and thought content normal.     Assessment/Plan   Liudmila was seen today for gi problem.    Diagnoses and all orders for this visit:    Hx of adenomatous colonic polyps  -     Case Request; Standing  -     Follow Anesthesia Guidelines / Standing Orders; Future  -     Obtain Informed Consent; Future  -     Case Request  -     SUPREP BOWEL PREP KIT 17.5-3.13-1.6 GM/177ML solution oral solution; Take as directed by office instructions provided    Gastroesophageal reflux disease without esophagitis    Nausea  Comments:  improved    Nonsmoker    Obesity, unspecified obesity severity, unspecified obesity type    continue PPI therapy call with any recurrent dysphagia.     COLONOSCOPY WITH ANESTHESIA (N/A)  Part of this note may be an electronic transcription/translation of spoken language to printed text using the Dragon Dictation System.  Body mass index is 26.09 kg/m².  Return if symptoms worsen or fail to improve.    Patient's Body mass index is 26.09 kg/m². BMI is above normal parameters. Recommendations include: nutrition counseling.      All risks, benefits, alternatives, and indications of colonoscopy and/or Endoscopy procedure have been discussed with the patient. Risks to include perforation of the colon requiring possible surgery or colostomy, risk of bleeding from biopsies or removal of colon tissue, possibility of missing a colon  polyp or cancer, or adverse drug reaction.  Benefits to include the diagnosis and management of disease of the colon and rectum. Alternatives to include barium enema, radiographic evaluation, lab testing or no intervention. Pt verbalizes understanding and agrees.     Allegra Houser, APRN  7/7/2020  12:56      Obesity, Adult  Obesity is the condition of having too much total body fat. Being overweight or obese means that your weight is greater than what is considered healthy for your body size. Obesity is determined by a measurement called BMI. BMI is an estimate of body fat and is calculated from height and weight. For adults, a BMI of 30 or higher is considered obese.  Obesity can eventually lead to other health concerns and major illnesses, including:  · Stroke.  · Coronary artery disease (CAD).  · Type 2 diabetes.  · Some types of cancer, including cancers of the colon, breast, uterus, and gallbladder.  · Osteoarthritis.  · High blood pressure (hypertension).  · High cholesterol.  · Sleep apnea.  · Gallbladder stones.  · Infertility problems.  What are the causes?  The main cause of obesity is taking in (consuming) more calories than your body uses for energy. Other factors that contribute to this condition may include:  · Being born with genes that make you more likely to become obese.  · Having a medical condition that causes obesity. These conditions include:  ¨ Hypothyroidism.  ¨ Polycystic ovarian syndrome (PCOS).  ¨ Binge-eating disorder.  ¨ Cushing syndrome.  · Taking certain medicines, such as steroids, antidepressants, and seizure medicines.  · Not being physically active (sedentary lifestyle).  · Living where there are limited places to exercise safely or buy healthy foods.  · Not getting enough sleep.  What increases the risk?  The following factors may increase your risk of this condition:  · Having a family history of obesity.  · Being a woman of -American descent.  · Being a man of   descent.  What are the signs or symptoms?  Having excessive body fat is the main symptom of this condition.  How is this diagnosed?  This condition may be diagnosed based on:  · Your symptoms.  · Your medical history.  · A physical exam. Your health care provider may measure:  ¨ Your BMI. If you are an adult with a BMI between 25 and less than 30, you are considered overweight. If you are an adult with a BMI of 30 or higher, you are considered obese.  ¨ The distances around your hips and your waist (circumferences). These may be compared to each other to help diagnose your condition.  ¨ Your skinfold thickness. Your health care provider may gently pinch a fold of your skin and measure it.  How is this treated?  Treatment for this condition often includes changing your lifestyle. Treatment may include some or all of the following:  · Dietary changes. Work with your health care provider and a dietitian to set a weight-loss goal that is healthy and reasonable for you. Dietary changes may include eating:  ¨ Smaller portions. A portion size is the amount of a particular food that is healthy for you to eat at one time. This varies from person to person.  ¨ Low-calorie or low-fat options.  ¨ More whole grains, fruits, and vegetables.  · Regular physical activity. This may include aerobic activity (cardio) and strength training.  · Medicine to help you lose weight. Your health care provider may prescribe medicine if you are unable to lose 1 pound a week after 6 weeks of eating more healthily and doing more physical activity.  · Surgery. Surgical options may include gastric banding and gastric bypass. Surgery may be done if:  ¨ Other treatments have not helped to improve your condition.  ¨ You have a BMI of 40 or higher.  ¨ You have life-threatening health problems related to obesity.  Follow these instructions at home:     Eating and drinking     · Follow recommendations from your health care provider about what  you eat and drink. Your health care provider may advise you to:  ¨ Limit fast foods, sweets, and processed snack foods.  ¨ Choose low-fat options, such as low-fat milk instead of whole milk.  ¨ Eat 5 or more servings of fruits or vegetables every day.  ¨ Eat at home more often. This gives you more control over what you eat.  ¨ Choose healthy foods when you eat out.  ¨ Learn what a healthy portion size is.  ¨ Keep low-fat snacks on hand.  ¨ Avoid sugary drinks, such as soda, fruit juice, iced tea sweetened with sugar, and flavored milk.  ¨ Eat a healthy breakfast.  · Drink enough water to keep your urine clear or pale yellow.  · Do not go without eating for long periods of time (do not fast) or follow a fad diet. Fasting and fad diets can be unhealthy and even dangerous.  Physical Activity   · Exercise regularly, as told by your health care provider. Ask your health care provider what types of exercise are safe for you and how often you should exercise.  · Warm up and stretch before being active.  · Cool down and stretch after being active.  · Rest between periods of activity.  Lifestyle   · Limit the time that you spend in front of your TV, computer, or video game system.  · Find ways to reward yourself that do not involve food.  · Limit alcohol intake to no more than 1 drink a day for nonpregnant women and 2 drinks a day for men. One drink equals 12 oz of beer, 5 oz of wine, or 1½ oz of hard liquor.  General instructions   · Keep a weight loss journal to keep track of the food you eat and how much you exercise you get.  · Take over-the-counter and prescription medicines only as told by your health care provider.  · Take vitamins and supplements only as told by your health care provider.  · Consider joining a support group. Your health care provider may be able to recommend a support group.  · Keep all follow-up visits as told by your health care provider. This is important.  Contact a health care provider  if:  · You are unable to meet your weight loss goal after 6 weeks of dietary and lifestyle changes.  This information is not intended to replace advice given to you by your health care provider. Make sure you discuss any questions you have with your health care provider.  Document Released: 01/25/2006 Document Revised: 05/22/2017 Document Reviewed: 10/05/2016  [employer] Interactive Patient Education © 2017 [employer]      If you smoke or use tobacco, 4 minutes reading provided  Steps to Quit Smoking  Smoking tobacco can be harmful to your health and can affect almost every organ in your body. Smoking puts you, and those around you, at risk for developing many serious chronic diseases. Quitting smoking is difficult, but it is one of the best things that you can do for your health. It is never too late to quit.  What are the benefits of quitting smoking?  When you quit smoking, you lower your risk of developing serious diseases and conditions, such as:  · Lung cancer or lung disease, such as COPD.  · Heart disease.  · Stroke.  · Heart attack.  · Infertility.  · Osteoporosis and bone fractures.  Additionally, symptoms such as coughing, wheezing, and shortness of breath may get better when you quit. You may also find that you get sick less often because your body is stronger at fighting off colds and infections. If you are pregnant, quitting smoking can help to reduce your chances of having a baby of low birth weight.  How do I get ready to quit?  When you decide to quit smoking, create a plan to make sure that you are successful. Before you quit:  · Pick a date to quit. Set a date within the next two weeks to give you time to prepare.  · Write down the reasons why you are quitting. Keep this list in places where you will see it often, such as on your bathroom mirror or in your car or wallet.  · Identify the people, places, things, and activities that make you want to smoke (triggers) and avoid them. Make sure to take  these actions:  ¨ Throw away all cigarettes at home, at work, and in your car.  ¨ Throw away smoking accessories, such as ashtrays and lighters.  ¨ Clean your car and make sure to empty the ashtray.  ¨ Clean your home, including curtains and carpets.  · Tell your family, friends, and coworkers that you are quitting. Support from your loved ones can make quitting easier.  · Talk with your health care provider about your options for quitting smoking.  · Find out what treatment options are covered by your health insurance.  What strategies can I use to quit smoking?  Talk with your healthcare provider about different strategies to quit smoking. Some strategies include:  · Quitting smoking altogether instead of gradually lessening how much you smoke over a period of time. Research shows that quitting “cold turkey” is more successful than gradually quitting.  · Attending in-person counseling to help you build problem-solving skills. You are more likely to have success in quitting if you attend several counseling sessions. Even short sessions of 10 minutes can be effective.  · Finding resources and support systems that can help you to quit smoking and remain smoke-free after you quit. These resources are most helpful when you use them often. They can include:  ¨ Online chats with a counselor.  ¨ Telephone quitlines.  ¨ Printed self-help materials.  ¨ Support groups or group counseling.  ¨ Text messaging programs.  ¨ Mobile phone applications.  · Taking medicines to help you quit smoking. (If you are pregnant or breastfeeding, talk with your health care provider first.) Some medicines contain nicotine and some do not. Both types of medicines help with cravings, but the medicines that include nicotine help to relieve withdrawal symptoms. Your health care provider may recommend:  ¨ Nicotine patches, gum, or lozenges.  ¨ Nicotine inhalers or sprays.  ¨ Non-nicotine medicine that is taken by mouth.  Talk with your health care  provider about combining strategies, such as taking medicines while you are also receiving in-person counseling. Using these two strategies together makes you more likely to succeed in quitting than if you used either strategy on its own.  If you are pregnant or breastfeeding, talk with your health care provider about finding counseling or other support strategies to quit smoking. Do not take medicine to help you quit smoking unless told to do so by your health care provider.  What things can I do to make it easier to quit?  Quitting smoking might feel overwhelming at first, but there is a lot that you can do to make it easier. Take these important actions:  · Reach out to your family and friends and ask that they support and encourage you during this time. Call telephone quitlines, reach out to support groups, or work with a counselor for support.  · Ask people who smoke to avoid smoking around you.  · Avoid places that trigger you to smoke, such as bars, parties, or smoke-break areas at work.  · Spend time around people who do not smoke.  · Lessen stress in your life, because stress can be a smoking trigger for some people. To lessen stress, try:  ¨ Exercising regularly.  ¨ Deep-breathing exercises.  ¨ Yoga.  ¨ Meditating.  ¨ Performing a body scan. This involves closing your eyes, scanning your body from head to toe, and noticing which parts of your body are particularly tense. Purposefully relax the muscles in those areas.  · Download or purchase mobile phone or tablet apps (applications) that can help you stick to your quit plan by providing reminders, tips, and encouragement. There are many free apps, such as QuitGuide from the CDC (Centers for Disease Control and Prevention). You can find other support for quitting smoking (smoking cessation) through smokefree.gov and other websites.  How will I feel when I quit smoking?  Within the first 24 hours of quitting smoking, you may start to feel some withdrawal  symptoms. These symptoms are usually most noticeable 2-3 days after quitting, but they usually do not last beyond 2-3 weeks. Changes or symptoms that you might experience include:  · Mood swings.  · Restlessness, anxiety, or irritation.  · Difficulty concentrating.  · Dizziness.  · Strong cravings for sugary foods in addition to nicotine.  · Mild weight gain.  · Constipation.  · Nausea.  · Coughing or a sore throat.  · Changes in how your medicines work in your body.  · A depressed mood.  · Difficulty sleeping (insomnia).  After the first 2-3 weeks of quitting, you may start to notice more positive results, such as:  · Improved sense of smell and taste.  · Decreased coughing and sore throat.  · Slower heart rate.  · Lower blood pressure.  · Clearer skin.  · The ability to breathe more easily.  · Fewer sick days.  Quitting smoking is very challenging for most people. Do not get discouraged if you are not successful the first time. Some people need to make many attempts to quit before they achieve long-term success. Do your best to stick to your quit plan, and talk with your health care provider if you have any questions or concerns.  This information is not intended to replace advice given to you by your health care provider. Make sure you discuss any questions you have with your health care provider.  Document Released: 12/12/2002 Document Revised: 08/15/2017 Document Reviewed: 05/03/2016  [employer] Interactive Patient Education © 2017 [employer]

## 2020-07-07 NOTE — PROGRESS NOTES
Liudmila Ga  1950 7/7/2020  Chief Complaint   Patient presents with   • GI Problem     Vomiting     Subjective   HPI  Liudmila Ga is a 69 y.o. female who presents with a GERD and nausea. This is ongoing persistent for her for years. Stable at this time. She is on PPI therapy and managing. No dysphagia. She is s/p endo last year and gastric emptying study was normal.   Positive family hx for colon cancer in her brother. Hx of adenomatous polyp with her last colonoscopy. No change in bowels. No rectal bleeding.    Past Medical History:   Diagnosis Date   • Arthritis    • Colon polyps    • Disease of thyroid gland    • GERD (gastroesophageal reflux disease)    • Hiatal hernia    • Osteopenia    • Overactive bladder    • Vitamin D deficiency      Past Surgical History:   Procedure Laterality Date   • CHOLECYSTECTOMY  2004   • COLONOSCOPY  08/20/2014    HYPERPLASTIC POLYP ILEOCECAL VALVE AND HEPATIC FLEXURE, DIVERTICULA IN SIGMOID COLON   • COLONOSCOPY  02/04/2014    TUBULAR ADENOMA CECAL POLYP, TUBULAR ADENOMA RECTAL POLYP, HYPERPLASTICS POLYPS AT ILEOCECAL VALVE AND RECTAL.   • COLONOSCOPY N/A 12/11/2017    2 Sessile serrated adenomas large intestine and hepatic flexure, 2 tubular adenomas cecum and at 60 cm  repeat exam in 3 years   • ENDOSCOPY  12/22/2015    HIATAL HERNIA, MILD SCHATZKI RING DILATED   • ENDOSCOPY N/A 11/7/2017    HH, dilated otherwise normal exam   • ENDOSCOPY N/A 8/19/2019    Small HH, NOn-obstructing Schatzki ring dilated, food residue in stomach   • ENDOSCOPY N/A 10/3/2019    Gastritis otherwise normal exam   • HYSTERECTOMY  06/2011    TOTAL   • TONSILLECTOMY         Outpatient Medications Marked as Taking for the 7/7/20 encounter (Office Visit) with Allegra Houser APRN   Medication Sig Dispense Refill   • atenolol (TENORMIN) 25 MG tablet Take 25 mg by mouth Daily.     • Biotin 01325 MCG tablet Take  by mouth.     • bisacodyl (DULCOLAX) 5 MG EC tablet Take 5 mg  by mouth Daily As Needed for Constipation.     • cetirizine (zyrTEC) 10 MG tablet Take 10 mg by mouth Daily.     • Cholecalciferol (VITAMIN D3) 2000 units tablet Take  by mouth.     • diclofenac (VOLTAREN) 75 MG EC tablet      • DULoxetine (CYMBALTA) 30 MG capsule Take 30 mg by mouth Daily.     • fluticasone (FLONASE) 50 MCG/ACT nasal spray 2 sprays into each nostril Daily.     • Lysine 500 MG capsule Take 500 mg by mouth.     • Omega-3 Fatty Acids (FISH OIL) 1200 MG capsule capsule Take 1,200 mg by mouth Daily.     • pantoprazole (PROTONIX) 40 MG EC tablet Take 40 mg by mouth Daily.     • SYNTHROID 25 MCG tablet      • vitamin C (ASCORBIC ACID) 500 MG tablet Take 1,000 mg by mouth Daily.     • Zinc 50 MG capsule Take  by mouth.     • zolpidem (AMBIEN) 10 MG tablet Take 10 mg by mouth At Night As Needed for Sleep.       Allergies   Allergen Reactions   • Lortab [Hydrocodone-Acetaminophen] Itching   • Sulfa Antibiotics Unknown (See Comments)     unknown     Social History     Socioeconomic History   • Marital status:      Spouse name: Not on file   • Number of children: Not on file   • Years of education: Not on file   • Highest education level: Not on file   Tobacco Use   • Smoking status: Former Smoker     Last attempt to quit: 2012     Years since quittin.5   • Smokeless tobacco: Never Used   Substance and Sexual Activity   • Alcohol use: Yes     Comment: nightly   • Drug use: No   • Sexual activity: Never     Family History   Problem Relation Age of Onset   • Liver cancer Mother    • Colon cancer Brother    • Colon polyps Brother    • Breast cancer Neg Hx    • Ovarian cancer Neg Hx      Health Maintenance   Topic Date Due   • TDAP/TD VACCINES (1 - Tdap) 1959   • ZOSTER VACCINE (2 of 3) 2009   • Pneumococcal Vaccine Once at 65 Years Old  2013   • HEPATITIS C SCREENING  2017   • MEDICARE ANNUAL WELLNESS  2017   • COLOGUARD  2017   • DXA SCAN  2018   •  "MAMMOGRAM  06/19/2020   • INFLUENZA VACCINE  08/01/2020     Review of Systems   Constitutional: Negative for activity change, appetite change, chills, diaphoresis, fatigue, fever and unexpected weight change.   HENT: Negative for ear pain, hearing loss, mouth sores, sore throat, trouble swallowing and voice change.    Eyes: Negative.    Respiratory: Negative for cough, choking, shortness of breath and wheezing.    Cardiovascular: Negative for chest pain and palpitations.   Gastrointestinal: Negative for abdominal pain, blood in stool, constipation, diarrhea, nausea and vomiting.   Endocrine: Negative for cold intolerance and heat intolerance.   Genitourinary: Negative for decreased urine volume, dysuria, frequency, hematuria and urgency.   Musculoskeletal: Negative for back pain, gait problem and myalgias.   Skin: Negative for color change, pallor and rash.   Allergic/Immunologic: Negative for food allergies and immunocompromised state.   Neurological: Negative for dizziness, tremors, seizures, syncope, weakness, light-headedness, numbness and headaches.   Hematological: Negative for adenopathy. Does not bruise/bleed easily.   Psychiatric/Behavioral: Negative for agitation and confusion. The patient is not nervous/anxious.    All other systems reviewed and are negative.    Objective   Vitals:    07/07/20 1241   BP: 128/78   Pulse: 76   Resp: 16   Weight: 68.9 kg (152 lb)   Height: 162.6 cm (64\")     Body mass index is 26.09 kg/m².  Physical Exam   Constitutional: She is oriented to person, place, and time. She appears well-developed and well-nourished.   HENT:   Head: Normocephalic and atraumatic.   Eyes: Pupils are equal, round, and reactive to light.   Neck: Normal range of motion. Neck supple. No tracheal deviation present.   Cardiovascular: Normal rate, regular rhythm and normal heart sounds. Exam reveals no gallop and no friction rub.   No murmur heard.  Pulmonary/Chest: Effort normal and breath sounds normal. " No respiratory distress. She has no wheezes. She has no rales. She exhibits no tenderness.   Abdominal: Soft. Bowel sounds are normal. She exhibits no distension. There is no hepatosplenomegaly. There is no tenderness. There is no rigidity, no rebound and no guarding.   Musculoskeletal: Normal range of motion. She exhibits no edema, tenderness or deformity.   Neurological: She is alert and oriented to person, place, and time. She has normal reflexes.   Skin: Skin is warm and dry. No rash noted. No pallor.   Psychiatric: She has a normal mood and affect. Her behavior is normal. Judgment and thought content normal.     Assessment/Plan   Liudmila was seen today for gi problem.    Diagnoses and all orders for this visit:    Hx of adenomatous colonic polyps  -     Case Request; Standing  -     Follow Anesthesia Guidelines / Standing Orders; Future  -     Obtain Informed Consent; Future  -     Case Request  -     SUPREP BOWEL PREP KIT 17.5-3.13-1.6 GM/177ML solution oral solution; Take as directed by office instructions provided    Gastroesophageal reflux disease without esophagitis    Nausea  Comments:  improved    Nonsmoker    Obesity, unspecified obesity severity, unspecified obesity type    continue PPI therapy call with any recurrent dysphagia.     COLONOSCOPY WITH ANESTHESIA (N/A)  Part of this note may be an electronic transcription/translation of spoken language to printed text using the Dragon Dictation System.  Body mass index is 26.09 kg/m².  Return if symptoms worsen or fail to improve.    Patient's Body mass index is 26.09 kg/m². BMI is above normal parameters. Recommendations include: nutrition counseling.      All risks, benefits, alternatives, and indications of colonoscopy and/or Endoscopy procedure have been discussed with the patient. Risks to include perforation of the colon requiring possible surgery or colostomy, risk of bleeding from biopsies or removal of colon tissue, possibility of missing a colon  polyp or cancer, or adverse drug reaction.  Benefits to include the diagnosis and management of disease of the colon and rectum. Alternatives to include barium enema, radiographic evaluation, lab testing or no intervention. Pt verbalizes understanding and agrees.     Allegra Houser, APRN  7/7/2020  12:56      Obesity, Adult  Obesity is the condition of having too much total body fat. Being overweight or obese means that your weight is greater than what is considered healthy for your body size. Obesity is determined by a measurement called BMI. BMI is an estimate of body fat and is calculated from height and weight. For adults, a BMI of 30 or higher is considered obese.  Obesity can eventually lead to other health concerns and major illnesses, including:  · Stroke.  · Coronary artery disease (CAD).  · Type 2 diabetes.  · Some types of cancer, including cancers of the colon, breast, uterus, and gallbladder.  · Osteoarthritis.  · High blood pressure (hypertension).  · High cholesterol.  · Sleep apnea.  · Gallbladder stones.  · Infertility problems.  What are the causes?  The main cause of obesity is taking in (consuming) more calories than your body uses for energy. Other factors that contribute to this condition may include:  · Being born with genes that make you more likely to become obese.  · Having a medical condition that causes obesity. These conditions include:  ¨ Hypothyroidism.  ¨ Polycystic ovarian syndrome (PCOS).  ¨ Binge-eating disorder.  ¨ Cushing syndrome.  · Taking certain medicines, such as steroids, antidepressants, and seizure medicines.  · Not being physically active (sedentary lifestyle).  · Living where there are limited places to exercise safely or buy healthy foods.  · Not getting enough sleep.  What increases the risk?  The following factors may increase your risk of this condition:  · Having a family history of obesity.  · Being a woman of -American descent.  · Being a man of   descent.  What are the signs or symptoms?  Having excessive body fat is the main symptom of this condition.  How is this diagnosed?  This condition may be diagnosed based on:  · Your symptoms.  · Your medical history.  · A physical exam. Your health care provider may measure:  ¨ Your BMI. If you are an adult with a BMI between 25 and less than 30, you are considered overweight. If you are an adult with a BMI of 30 or higher, you are considered obese.  ¨ The distances around your hips and your waist (circumferences). These may be compared to each other to help diagnose your condition.  ¨ Your skinfold thickness. Your health care provider may gently pinch a fold of your skin and measure it.  How is this treated?  Treatment for this condition often includes changing your lifestyle. Treatment may include some or all of the following:  · Dietary changes. Work with your health care provider and a dietitian to set a weight-loss goal that is healthy and reasonable for you. Dietary changes may include eating:  ¨ Smaller portions. A portion size is the amount of a particular food that is healthy for you to eat at one time. This varies from person to person.  ¨ Low-calorie or low-fat options.  ¨ More whole grains, fruits, and vegetables.  · Regular physical activity. This may include aerobic activity (cardio) and strength training.  · Medicine to help you lose weight. Your health care provider may prescribe medicine if you are unable to lose 1 pound a week after 6 weeks of eating more healthily and doing more physical activity.  · Surgery. Surgical options may include gastric banding and gastric bypass. Surgery may be done if:  ¨ Other treatments have not helped to improve your condition.  ¨ You have a BMI of 40 or higher.  ¨ You have life-threatening health problems related to obesity.  Follow these instructions at home:     Eating and drinking     · Follow recommendations from your health care provider about what  you eat and drink. Your health care provider may advise you to:  ¨ Limit fast foods, sweets, and processed snack foods.  ¨ Choose low-fat options, such as low-fat milk instead of whole milk.  ¨ Eat 5 or more servings of fruits or vegetables every day.  ¨ Eat at home more often. This gives you more control over what you eat.  ¨ Choose healthy foods when you eat out.  ¨ Learn what a healthy portion size is.  ¨ Keep low-fat snacks on hand.  ¨ Avoid sugary drinks, such as soda, fruit juice, iced tea sweetened with sugar, and flavored milk.  ¨ Eat a healthy breakfast.  · Drink enough water to keep your urine clear or pale yellow.  · Do not go without eating for long periods of time (do not fast) or follow a fad diet. Fasting and fad diets can be unhealthy and even dangerous.  Physical Activity   · Exercise regularly, as told by your health care provider. Ask your health care provider what types of exercise are safe for you and how often you should exercise.  · Warm up and stretch before being active.  · Cool down and stretch after being active.  · Rest between periods of activity.  Lifestyle   · Limit the time that you spend in front of your TV, computer, or video game system.  · Find ways to reward yourself that do not involve food.  · Limit alcohol intake to no more than 1 drink a day for nonpregnant women and 2 drinks a day for men. One drink equals 12 oz of beer, 5 oz of wine, or 1½ oz of hard liquor.  General instructions   · Keep a weight loss journal to keep track of the food you eat and how much you exercise you get.  · Take over-the-counter and prescription medicines only as told by your health care provider.  · Take vitamins and supplements only as told by your health care provider.  · Consider joining a support group. Your health care provider may be able to recommend a support group.  · Keep all follow-up visits as told by your health care provider. This is important.  Contact a health care provider  if:  · You are unable to meet your weight loss goal after 6 weeks of dietary and lifestyle changes.  This information is not intended to replace advice given to you by your health care provider. Make sure you discuss any questions you have with your health care provider.  Document Released: 01/25/2006 Document Revised: 05/22/2017 Document Reviewed: 10/05/2016  [employer] Interactive Patient Education © 2017 [employer]      If you smoke or use tobacco, 4 minutes reading provided  Steps to Quit Smoking  Smoking tobacco can be harmful to your health and can affect almost every organ in your body. Smoking puts you, and those around you, at risk for developing many serious chronic diseases. Quitting smoking is difficult, but it is one of the best things that you can do for your health. It is never too late to quit.  What are the benefits of quitting smoking?  When you quit smoking, you lower your risk of developing serious diseases and conditions, such as:  · Lung cancer or lung disease, such as COPD.  · Heart disease.  · Stroke.  · Heart attack.  · Infertility.  · Osteoporosis and bone fractures.  Additionally, symptoms such as coughing, wheezing, and shortness of breath may get better when you quit. You may also find that you get sick less often because your body is stronger at fighting off colds and infections. If you are pregnant, quitting smoking can help to reduce your chances of having a baby of low birth weight.  How do I get ready to quit?  When you decide to quit smoking, create a plan to make sure that you are successful. Before you quit:  · Pick a date to quit. Set a date within the next two weeks to give you time to prepare.  · Write down the reasons why you are quitting. Keep this list in places where you will see it often, such as on your bathroom mirror or in your car or wallet.  · Identify the people, places, things, and activities that make you want to smoke (triggers) and avoid them. Make sure to take  these actions:  ¨ Throw away all cigarettes at home, at work, and in your car.  ¨ Throw away smoking accessories, such as ashtrays and lighters.  ¨ Clean your car and make sure to empty the ashtray.  ¨ Clean your home, including curtains and carpets.  · Tell your family, friends, and coworkers that you are quitting. Support from your loved ones can make quitting easier.  · Talk with your health care provider about your options for quitting smoking.  · Find out what treatment options are covered by your health insurance.  What strategies can I use to quit smoking?  Talk with your healthcare provider about different strategies to quit smoking. Some strategies include:  · Quitting smoking altogether instead of gradually lessening how much you smoke over a period of time. Research shows that quitting “cold turkey” is more successful than gradually quitting.  · Attending in-person counseling to help you build problem-solving skills. You are more likely to have success in quitting if you attend several counseling sessions. Even short sessions of 10 minutes can be effective.  · Finding resources and support systems that can help you to quit smoking and remain smoke-free after you quit. These resources are most helpful when you use them often. They can include:  ¨ Online chats with a counselor.  ¨ Telephone quitlines.  ¨ Printed self-help materials.  ¨ Support groups or group counseling.  ¨ Text messaging programs.  ¨ Mobile phone applications.  · Taking medicines to help you quit smoking. (If you are pregnant or breastfeeding, talk with your health care provider first.) Some medicines contain nicotine and some do not. Both types of medicines help with cravings, but the medicines that include nicotine help to relieve withdrawal symptoms. Your health care provider may recommend:  ¨ Nicotine patches, gum, or lozenges.  ¨ Nicotine inhalers or sprays.  ¨ Non-nicotine medicine that is taken by mouth.  Talk with your health care  provider about combining strategies, such as taking medicines while you are also receiving in-person counseling. Using these two strategies together makes you more likely to succeed in quitting than if you used either strategy on its own.  If you are pregnant or breastfeeding, talk with your health care provider about finding counseling or other support strategies to quit smoking. Do not take medicine to help you quit smoking unless told to do so by your health care provider.  What things can I do to make it easier to quit?  Quitting smoking might feel overwhelming at first, but there is a lot that you can do to make it easier. Take these important actions:  · Reach out to your family and friends and ask that they support and encourage you during this time. Call telephone quitlines, reach out to support groups, or work with a counselor for support.  · Ask people who smoke to avoid smoking around you.  · Avoid places that trigger you to smoke, such as bars, parties, or smoke-break areas at work.  · Spend time around people who do not smoke.  · Lessen stress in your life, because stress can be a smoking trigger for some people. To lessen stress, try:  ¨ Exercising regularly.  ¨ Deep-breathing exercises.  ¨ Yoga.  ¨ Meditating.  ¨ Performing a body scan. This involves closing your eyes, scanning your body from head to toe, and noticing which parts of your body are particularly tense. Purposefully relax the muscles in those areas.  · Download or purchase mobile phone or tablet apps (applications) that can help you stick to your quit plan by providing reminders, tips, and encouragement. There are many free apps, such as QuitGuide from the CDC (Centers for Disease Control and Prevention). You can find other support for quitting smoking (smoking cessation) through smokefree.gov and other websites.  How will I feel when I quit smoking?  Within the first 24 hours of quitting smoking, you may start to feel some withdrawal  symptoms. These symptoms are usually most noticeable 2-3 days after quitting, but they usually do not last beyond 2-3 weeks. Changes or symptoms that you might experience include:  · Mood swings.  · Restlessness, anxiety, or irritation.  · Difficulty concentrating.  · Dizziness.  · Strong cravings for sugary foods in addition to nicotine.  · Mild weight gain.  · Constipation.  · Nausea.  · Coughing or a sore throat.  · Changes in how your medicines work in your body.  · A depressed mood.  · Difficulty sleeping (insomnia).  After the first 2-3 weeks of quitting, you may start to notice more positive results, such as:  · Improved sense of smell and taste.  · Decreased coughing and sore throat.  · Slower heart rate.  · Lower blood pressure.  · Clearer skin.  · The ability to breathe more easily.  · Fewer sick days.  Quitting smoking is very challenging for most people. Do not get discouraged if you are not successful the first time. Some people need to make many attempts to quit before they achieve long-term success. Do your best to stick to your quit plan, and talk with your health care provider if you have any questions or concerns.  This information is not intended to replace advice given to you by your health care provider. Make sure you discuss any questions you have with your health care provider.  Document Released: 12/12/2002 Document Revised: 08/15/2017 Document Reviewed: 05/03/2016  [employer] Interactive Patient Education © 2017 [employer]

## 2020-07-14 ENCOUNTER — TRANSCRIBE ORDERS (OUTPATIENT)
Dept: ADMINISTRATIVE | Facility: HOSPITAL | Age: 70
End: 2020-07-14

## 2020-07-14 DIAGNOSIS — Z01.818 PRE-OP TESTING: Primary | ICD-10-CM

## 2020-07-20 ENCOUNTER — LAB (OUTPATIENT)
Dept: LAB | Facility: HOSPITAL | Age: 70
End: 2020-07-20

## 2020-07-20 PROCEDURE — C9803 HOPD COVID-19 SPEC COLLECT: HCPCS | Performed by: INTERNAL MEDICINE

## 2020-07-20 PROCEDURE — U0003 INFECTIOUS AGENT DETECTION BY NUCLEIC ACID (DNA OR RNA); SEVERE ACUTE RESPIRATORY SYNDROME CORONAVIRUS 2 (SARS-COV-2) (CORONAVIRUS DISEASE [COVID-19]), AMPLIFIED PROBE TECHNIQUE, MAKING USE OF HIGH THROUGHPUT TECHNOLOGIES AS DESCRIBED BY CMS-2020-01-R: HCPCS | Performed by: INTERNAL MEDICINE

## 2020-07-21 LAB
COVID LABCORP PRIORITY: NORMAL
SARS-COV-2 RNA RESP QL NAA+PROBE: NOT DETECTED

## 2020-07-23 ENCOUNTER — ANESTHESIA (OUTPATIENT)
Dept: GASTROENTEROLOGY | Facility: HOSPITAL | Age: 70
End: 2020-07-23

## 2020-07-23 ENCOUNTER — HOSPITAL ENCOUNTER (OUTPATIENT)
Facility: HOSPITAL | Age: 70
Setting detail: HOSPITAL OUTPATIENT SURGERY
Discharge: HOME | End: 2020-07-23
Attending: INTERNAL MEDICINE | Admitting: INTERNAL MEDICINE

## 2020-07-23 ENCOUNTER — ANESTHESIA EVENT (OUTPATIENT)
Dept: GASTROENTEROLOGY | Facility: HOSPITAL | Age: 70
End: 2020-07-23

## 2020-07-23 VITALS
DIASTOLIC BLOOD PRESSURE: 67 MMHG | HEART RATE: 69 BPM | HEIGHT: 64 IN | SYSTOLIC BLOOD PRESSURE: 113 MMHG | BODY MASS INDEX: 25.44 KG/M2 | OXYGEN SATURATION: 100 % | TEMPERATURE: 97.7 F | WEIGHT: 149 LBS | RESPIRATION RATE: 17 BRPM

## 2020-07-23 DIAGNOSIS — Z86.010 HX OF ADENOMATOUS COLONIC POLYPS: ICD-10-CM

## 2020-07-23 PROCEDURE — G0105 COLORECTAL SCRN; HI RISK IND: HCPCS | Performed by: INTERNAL MEDICINE

## 2020-07-23 PROCEDURE — 25010000002 PROPOFOL 10 MG/ML EMULSION: Performed by: NURSE ANESTHETIST, CERTIFIED REGISTERED

## 2020-07-23 RX ORDER — LIDOCAINE HYDROCHLORIDE 10 MG/ML
0.5 INJECTION, SOLUTION EPIDURAL; INFILTRATION; INTRACAUDAL; PERINEURAL ONCE AS NEEDED
Status: DISCONTINUED | OUTPATIENT
Start: 2020-07-23 | End: 2020-07-23 | Stop reason: HOSPADM

## 2020-07-23 RX ORDER — SODIUM CHLORIDE 0.9 % (FLUSH) 0.9 %
10 SYRINGE (ML) INJECTION AS NEEDED
Status: DISCONTINUED | OUTPATIENT
Start: 2020-07-23 | End: 2020-07-23 | Stop reason: HOSPADM

## 2020-07-23 RX ORDER — METOCLOPRAMIDE 5 MG/1
5 TABLET ORAL 2 TIMES DAILY
COMMUNITY

## 2020-07-23 RX ORDER — SODIUM CHLORIDE 9 MG/ML
500 INJECTION, SOLUTION INTRAVENOUS CONTINUOUS PRN
Status: DISCONTINUED | OUTPATIENT
Start: 2020-07-23 | End: 2020-07-23 | Stop reason: HOSPADM

## 2020-07-23 RX ORDER — PHENTERMINE HYDROCHLORIDE 37.5 MG/1
37.5 TABLET ORAL DAILY
COMMUNITY
End: 2022-05-02

## 2020-07-23 RX ORDER — PROPOFOL 10 MG/ML
VIAL (ML) INTRAVENOUS AS NEEDED
Status: DISCONTINUED | OUTPATIENT
Start: 2020-07-23 | End: 2020-07-23 | Stop reason: SURG

## 2020-07-23 RX ADMIN — SODIUM CHLORIDE 500 ML: 9 INJECTION, SOLUTION INTRAVENOUS at 10:16

## 2020-07-23 RX ADMIN — LIDOCAINE HYDROCHLORIDE 40 MG: 20 INJECTION, SOLUTION INTRAVENOUS at 11:39

## 2020-07-23 RX ADMIN — PROPOFOL 210 MG: 10 INJECTION, EMULSION INTRAVENOUS at 11:39

## 2020-07-23 NOTE — ANESTHESIA POSTPROCEDURE EVALUATION
"Patient: Liudmila Ga    Procedure Summary     Date:  07/23/20 Room / Location:  [facility] ENDOSCOPY 5 / [facility] END[facility]    Anesthesia Start:  1137 Anesthesia Stop:  1204    Procedure:  COLONOSCOPY WITH ANESTHESIA (N/A ) Diagnosis:       Hx of adenomatous colonic polyps      (Hx of adenomatous colonic polyps [Z86.010])    Surgeon:  Stevan Mullins MD Provider:  Hernesto Eastman CRNA    Anesthesia Type:  MAC ASA Status:  2          Anesthesia Type: MAC    Vitals  Vitals Value Taken Time   /54 7/23/2020 12:10 PM   Temp     Pulse 69 7/23/2020 12:15 PM   Resp 20 7/23/2020 12:00 PM   SpO2 100 % 7/23/2020 12:15 PM   Vitals shown include unvalidated device data.        Post Anesthesia Care and Evaluation    Patient location during evaluation: PACU  Patient participation: complete - patient participated  Level of consciousness: awake and alert  Pain management: adequate  Airway patency: patent  Anesthetic complications: No anesthetic complications    Cardiovascular status: acceptable  Respiratory status: acceptable  Hydration status: acceptable    Comments: Blood pressure 121/49, pulse 68, temperature 97.7 °F (36.5 °C), temperature source Temporal, resp. rate 20, height 162.6 cm (64\"), weight 67.6 kg (149 lb), SpO2 99 %, not currently breastfeeding.    Pt discharged from PACU based on melvi score >8      "

## 2020-07-23 NOTE — INTERVAL H&P NOTE
H&P reviewed. The patient was examined and there are no changes to the H&P.       The following major R/B/A were discussed with the patient, however the list is not all inclusive . Risk:  Bleeding (immediate and delayed), perforation (rupture or tear), reaction to medication, missed lesion/cancer, pain during the procedure, infection, need for surgery, need for ostomy, need for mechanical ventilation (breathing machine), death.  Benefits: removal of polyp/tissue, burn/clip/or inject to stop bleeding, removal of foreign body, dilate any stricture.  Alternatives: Xray or CT, surgery, do nothing with associated risk   The patient was given time to ask question and received explanation, and agrees to proceed as per History and Physical.   No guarantee given or expressed.

## 2020-07-23 NOTE — ANESTHESIA PREPROCEDURE EVALUATION
Anesthesia Evaluation     Patient summary reviewed   no history of anesthetic complications:  NPO Solid Status: > 8 hours  NPO Liquid Status: > 2 hours           Airway   Mallampati: II  TM distance: >3 FB  Neck ROM: full  Dental      Comment: Upper right bridge      Pulmonary    (+) a smoker Former,   Cardiovascular   Exercise tolerance: good (4-7 METS)    (+) dysrhythmias Tachycardia,   (-) hypertension, past MI, CAD, cardiac stents, hyperlipidemia      Neuro/Psych- negative ROS  GI/Hepatic/Renal/Endo    (+)  GERD,  thyroid problem hypothyroidism  (-) liver disease, no renal disease, diabetes    Musculoskeletal     Abdominal    Substance History      OB/GYN          Other                        Anesthesia Plan    ASA 2     MAC     intravenous induction     Anesthetic plan, all risks, benefits, and alternatives have been provided, discussed and informed consent has been obtained with: patient.

## 2020-07-28 ENCOUNTER — TELEPHONE (OUTPATIENT)
Dept: GASTROENTEROLOGY | Facility: CLINIC | Age: 70
End: 2020-07-28

## 2022-01-19 ENCOUNTER — TRANSCRIBE ORDERS (OUTPATIENT)
Dept: ADMINISTRATIVE | Facility: HOSPITAL | Age: 72
End: 2022-01-19

## 2022-01-19 ENCOUNTER — LAB (OUTPATIENT)
Dept: LAB | Facility: HOSPITAL | Age: 72
End: 2022-01-19

## 2022-01-19 DIAGNOSIS — E03.9 HYPOTHYROIDISM, UNSPECIFIED TYPE: Primary | ICD-10-CM

## 2022-01-19 DIAGNOSIS — E03.9 HYPOTHYROIDISM, UNSPECIFIED TYPE: ICD-10-CM

## 2022-01-19 LAB — TSH SERPL DL<=0.05 MIU/L-ACNC: 2.77 UIU/ML (ref 0.27–4.2)

## 2022-01-19 PROCEDURE — 36415 COLL VENOUS BLD VENIPUNCTURE: CPT

## 2022-01-19 PROCEDURE — 84443 ASSAY THYROID STIM HORMONE: CPT

## 2022-01-25 ENCOUNTER — LAB (OUTPATIENT)
Dept: LAB | Facility: HOSPITAL | Age: 72
End: 2022-01-25

## 2022-01-25 ENCOUNTER — TRANSCRIBE ORDERS (OUTPATIENT)
Dept: LAB | Facility: HOSPITAL | Age: 72
End: 2022-01-25

## 2022-01-25 DIAGNOSIS — R05.9 COUGH: ICD-10-CM

## 2022-01-25 DIAGNOSIS — R05.9 COUGH: Primary | ICD-10-CM

## 2022-01-25 DIAGNOSIS — R09.81 NASAL CONGESTION: ICD-10-CM

## 2022-01-25 DIAGNOSIS — Z20.822 CLOSE EXPOSURE TO 2019-NCOV: ICD-10-CM

## 2022-01-25 DIAGNOSIS — Z20.822 EXPOSURE TO 2019-NCOV: Primary | ICD-10-CM

## 2022-01-25 LAB — SARS-COV-2 RNA PNL SPEC NAA+PROBE: DETECTED

## 2022-01-25 PROCEDURE — 87635 SARS-COV-2 COVID-19 AMP PRB: CPT | Performed by: INTERNAL MEDICINE

## 2022-01-25 PROCEDURE — C9803 HOPD COVID-19 SPEC COLLECT: HCPCS | Performed by: INTERNAL MEDICINE

## 2022-04-04 ENCOUNTER — TRANSCRIBE ORDERS (OUTPATIENT)
Dept: ADMINISTRATIVE | Facility: HOSPITAL | Age: 72
End: 2022-04-04

## 2022-04-04 DIAGNOSIS — R06.02 SHORTNESS OF BREATH: Primary | ICD-10-CM

## 2022-04-04 DIAGNOSIS — R00.0 TACHYCARDIA, UNSPECIFIED: ICD-10-CM

## 2022-04-25 ENCOUNTER — HOSPITAL ENCOUNTER (OUTPATIENT)
Dept: CT IMAGING | Facility: HOSPITAL | Age: 72
Discharge: HOME | End: 2022-04-25
Admitting: INTERNAL MEDICINE

## 2022-04-25 DIAGNOSIS — R00.0 TACHYCARDIA, UNSPECIFIED: ICD-10-CM

## 2022-04-25 DIAGNOSIS — R06.02 SHORTNESS OF BREATH: ICD-10-CM

## 2022-04-25 PROCEDURE — 75571 CT HRT W/O DYE W/CA TEST: CPT

## 2022-04-25 PROCEDURE — 75571 CT HRT W/O DYE W/CA TEST: CPT | Performed by: INTERNAL MEDICINE

## 2022-05-02 ENCOUNTER — OFFICE VISIT (OUTPATIENT)
Dept: CARDIOLOGY | Facility: CLINIC | Age: 72
End: 2022-05-02

## 2022-05-02 VITALS — DIASTOLIC BLOOD PRESSURE: 80 MMHG | SYSTOLIC BLOOD PRESSURE: 120 MMHG

## 2022-05-02 DIAGNOSIS — E03.9 HYPOTHYROIDISM (ACQUIRED): ICD-10-CM

## 2022-05-02 DIAGNOSIS — R06.09 DOE (DYSPNEA ON EXERTION): Primary | ICD-10-CM

## 2022-05-02 DIAGNOSIS — I25.10 CORONARY ARTERY DISEASE INVOLVING NATIVE CORONARY ARTERY OF NATIVE HEART WITHOUT ANGINA PECTORIS: ICD-10-CM

## 2022-05-02 DIAGNOSIS — Z86.16 HISTORY OF COVID-19: ICD-10-CM

## 2022-05-02 DIAGNOSIS — R00.2 PALPITATIONS: ICD-10-CM

## 2022-05-02 DIAGNOSIS — R53.83 EASY FATIGABILITY: ICD-10-CM

## 2022-05-02 PROCEDURE — 99204 OFFICE O/P NEW MOD 45 MIN: CPT | Performed by: INTERNAL MEDICINE

## 2022-05-02 RX ORDER — ROSUVASTATIN CALCIUM 5 MG/1
5 TABLET, COATED ORAL DAILY
Qty: 90 TABLET | Refills: 3 | Status: SHIPPED | OUTPATIENT
Start: 2022-05-02 | End: 2023-03-30

## 2022-05-02 NOTE — PROGRESS NOTES
Liudmila Ga  [id number]  1950  71 y.o.  female    Referring Provider: Nigel Ramos MD    Reason for  Visit:  Initial visit for shortness of breath and coronary calcification     Subjective    Moderate exertional shortness of breath on exertion relieved with rest  No significant cough or wheezing    Intermittent palpitations, once every several days to several weeks lasting for less than 1 minute  No associated symptoms of dizziness, weakness, chest pain,  shortness of breath    Now better on beta blocker therapy   No associated chest pain  No significant pedal edema    No fever or chills  No significant expectoration    No hemoptysis  No presyncope or syncope    Tolerating current medications well with no untoward side effects   Compliant with prescribed medication regimen. Tries to adhere to cardiac diet.     Easy fatiguability and increasing tired  Feels energy levels running low      BP well controlled at home.     Joint pain in small, medium and large joints       History of present illness:  Liudmila Ga is a 71 y.o. yo female with coronary calcification acquired hypothyroidism   who presents today for   Chief Complaint   Patient presents with   • Establish Care   .    History  Past Medical History:   Diagnosis Date   • Arthritis    • Colon polyps    • Disease of thyroid gland    • GERD (gastroesophageal reflux disease)    • Hiatal hernia    • Osteopenia    • Overactive bladder    • Vitamin D deficiency    ,   Past Surgical History:   Procedure Laterality Date   • CHOLECYSTECTOMY  2004   • COLONOSCOPY  08/20/2014    HYPERPLASTIC POLYP ILEOCECAL VALVE AND HEPATIC FLEXURE, DIVERTICULA IN SIGMOID COLON   • COLONOSCOPY  02/04/2014    TUBULAR ADENOMA CECAL POLYP, TUBULAR ADENOMA RECTAL POLYP, HYPERPLASTICS POLYPS AT ILEOCECAL VALVE AND RECTAL.   • COLONOSCOPY N/A 12/11/2017    2 Sessile serrated adenomas large intestine and hepatic flexure, 2 tubular adenomas cecum and at 60 cm  repeat  exam in 3 years   • COLONOSCOPY N/A 7/23/2020    Procedure: COLONOSCOPY WITH ANESTHESIA;  Surgeon: Stevan Mullins MD;  Location: [facility] ENDOSCOPY;  Service: Gastroenterology;  Laterality: N/A;  pre: hx adenomatous colon polyp  post: diverticulolsis  Meals, Nigel Chacon MD   • ENDOSCOPY  12/22/2015    HIATAL HERNIA, MILD SCHATZKI RING DILATED   • ENDOSCOPY N/A 11/7/2017    HH, dilated otherwise normal exam   • ENDOSCOPY N/A 8/19/2019    Small HH, NOn-obstructing Schatzki ring dilated, food residue in stomach   • ENDOSCOPY N/A 10/3/2019    Gastritis otherwise normal exam   • HYSTERECTOMY  06/2011    TOTAL   • TONSILLECTOMY     ,   Family History   Problem Relation Age of Onset   • Liver cancer Mother    • Colon cancer Brother    • Colon polyps Brother    • Breast cancer Neg Hx    • Ovarian cancer Neg Hx    ,   Social History     Tobacco Use   • Smoking status: Former Smoker     Quit date: 1/7/2012     Years since quitting: 10.3   • Smokeless tobacco: Never Used   Substance Use Topics   • Alcohol use: Yes     Comment: nightly   • Drug use: No   ,     Medications  Current Outpatient Medications   Medication Sig Dispense Refill   • atenolol (TENORMIN) 25 MG tablet Take 25 mg by mouth Daily.     • Biotin 44004 MCG tablet Take  by mouth.     • bisacodyl (DULCOLAX) 5 MG EC tablet Take 5 mg by mouth Daily As Needed for Constipation.     • cetirizine (zyrTEC) 10 MG tablet Take 10 mg by mouth Daily.     • Cholecalciferol (VITAMIN D3) 2000 units tablet Take  by mouth.     • diclofenac (VOLTAREN) 75 MG EC tablet      • DULoxetine (CYMBALTA) 30 MG capsule Take 30 mg by mouth Daily.     • fluticasone (FLONASE) 50 MCG/ACT nasal spray 2 sprays into each nostril Daily.     • Lysine 500 MG capsule Take 500 mg by mouth.     • metoclopramide (REGLAN) 5 MG tablet Take 5 mg by mouth 2 (Two) Times a Day.     • Omega-3 Fatty Acids (FISH OIL) 1200 MG capsule capsule Take 1,200 mg by mouth Daily.     • pantoprazole (PROTONIX) 40 MG EC  tablet Take 40 mg by mouth Daily.     • SYNTHROID 25 MCG tablet      • vitamin C (ASCORBIC ACID) 500 MG tablet Take 1,000 mg by mouth Daily.     • Zinc 50 MG capsule Take  by mouth.     • zolpidem (AMBIEN) 10 MG tablet Take 10 mg by mouth At Night As Needed for Sleep.     • rosuvastatin (CRESTOR) 5 MG tablet Take 1 tablet by mouth Daily. 90 tablet 3     No current facility-administered medications for this visit.       Allergies:  Lortab [hydrocodone-acetaminophen] and Sulfa antibiotics    Review of Systems  Review of Systems   Constitutional: Negative.   HENT: Negative.    Eyes: Negative.    Cardiovascular: Positive for dyspnea on exertion and palpitations. Negative for chest pain, claudication, cyanosis, irregular heartbeat, leg swelling, near-syncope, orthopnea, paroxysmal nocturnal dyspnea and syncope.   Respiratory: Negative.    Endocrine: Negative.    Hematologic/Lymphatic: Negative.    Skin: Negative.    Musculoskeletal: Positive for arthritis, back pain and joint pain.   Gastrointestinal: Negative for anorexia.   Genitourinary: Negative.    Neurological: Negative.    Psychiatric/Behavioral: Negative.        Objective     Physical Exam:  /80     Physical Exam  Constitutional:       Appearance: Normal appearance.   HENT:      Head: Normocephalic.   Eyes:      General: Lids are normal.   Neck:      Vascular: No carotid bruit.   Cardiovascular:      Rate and Rhythm: Regular rhythm.      Heart sounds: S1 normal and S2 normal. Murmur heard.    Systolic murmur is present with a grade of 2/6.  Pulmonary:      Effort: Pulmonary effort is normal.   Abdominal:      Palpations: Abdomen is soft.   Neurological:      Mental Status: She is alert.   Psychiatric:         Speech: Speech normal.         Behavior: Behavior normal.         Thought Content: Thought content normal.         Results Review:    PACS Images     Radiology Images    Study Result    Narrative & Impression   High-resolution computed tomography  imaging of the chest was performed  Particular attention paid to coronary arteries.  Images from the examination were analyzed for the presence and extent of coronary artery calcification using coronary calcification quantification software.  Patient tolerated the procedure well and there were no complications.  The results of coronary calcification analysis are as below.  The patient scores compared with published data relating to scores for people of similar age and the same gender.     Left main: 104.9  Left anterior descending coronary artery: 70.9  Left circumflex: 0  Right coronary artery: 0  Total calcium score of: 175.8     This patient with a coronary calcium score of 175.8 is at percentile 74  for matched population using CARREON calcium score calculator        Conclusion:       Current calcium score is at 74th percentile for matched population  Risk of intermediate to long-term cardiovascular events is elevated        Recommendation: Ongoing risk factor modifications  Further work-up if patient has chest pain               ____________________________________________________________________________________________________________________________________________  Health maintenance and recommendations    Low salt/ HTN/ Heart healthy carbohydrate restricted cardiac diet   The patient is advised to reduce or avoid caffeine or other cardiac stimulants.   Minimize or avoid  NSAID-type medications      Monitor for any signs of bleeding including red or dark stools. Fall precautions.   Advised staying uptodate with immunizations per established standard guidelines.    Offered to give patient  a copy of my notes     Questions were encouraged, asked and answered to the patient's  understanding and satisfaction. Questions if any regarding current medications and side effects, need for refills and importance of compliance to medications stressed.    Reviewed available prior notes, consults, prior visits, laboratory  findings, radiology and cardiology relevant reports. Updated chart as applicable. I have reviewed the patient's medical history in detail and updated the computerized patient record as relevant.      Updated patient regarding any new or relevant abnormalities on review of records or any new findings on physical exam. Mentioned to patient about purpose of visit and desirable health short and long term goals and objectives.    Primary to monitor CBC CMP Lipid panel and TSH as applicable    ___________________________________________________________________________________________________________________________________________   Procedures    Assessment/Plan   Diagnoses and all orders for this visit:    1. RILEY (dyspnea on exertion) (Primary)  -     Adult Transthoracic Echo Complete w/ Color, Spectral and Contrast if necessary per protocol; Future    2. Coronary artery disease involving native coronary artery of native heart without angina pectoris  -     Adult Stress Echo W/ Cont or Stress Agent if Necessary Per Protocol; Future    3. Palpitations  -     Holter Monitor - 72 Hour Up To 15 Days; Future    4. History of COVID-19    5. Hypothyroidism (acquired)    6. Easy fatigability    Other orders  -     rosuvastatin (CRESTOR) 5 MG tablet; Take 1 tablet by mouth Daily.  Dispense: 90 tablet; Refill: 3          Plan      Orders Placed This Encounter   Procedures   • Holter Monitor - 72 Hour Up To 15 Days     Standing Status:   Future     Standing Expiration Date:   5/2/2023     Order Specific Question:   Reason for exam?     Answer:   Palpitations     Order Specific Question:   Release to patient     Answer:   Immediate     Order Specific Question:   How many days is the patient to wear the monitor?     Answer:   14   • Adult Transthoracic Echo Complete w/ Color, Spectral and Contrast if necessary per protocol     Myocardial strain to be performed during echocardiogram as long as technically feasible     Standing Status:    Future     Standing Expiration Date:   5/2/2023     Order Specific Question:   Reason for exam?     Answer:   Dyspnea     Order Specific Question:   Release to patient     Answer:   Immediate   • Adult Stress Echo W/ Cont or Stress Agent if Necessary Per Protocol     Standing Status:   Future     Standing Expiration Date:   5/2/2023     Order Specific Question:   What stress agent will be used?     Answer:   Dobutamine     Order Specific Question:   Difficulty walking criteria?     Answer:   Musculoskeletal (hips, knees, feet, back, amputee)     Order Specific Question:   Reason for exam?     Answer:   Dyspnea     Order Specific Question:   Release to patient     Answer:   Immediate         Check BP and heart rates twice daily initially till blood pressures and heart rates under good control and then at least 3x / week,   If blood pressures continue to be well-controlled then can check week a month  at home and bring a recording for review next visit  If BP >130/85 or < 100/60 persistently over 3 reading 30 mins apart or if heart rates persistently above 100 bpm or less than 55 bpm call sooner for evaluation and advise     Start ECASA 81 mg daily regimen given risk factors and monitor for any signs of bleeding including red or dark stools. Fall precautions.      Keep LDL below 70 mg/dl. Monitor liver and renal functions.   Monitor CBC, CMP, TSH (as indicated) and Lipid Panel by primary       Requested Prescriptions     Signed Prescriptions Disp Refills   • rosuvastatin (CRESTOR) 5 MG tablet 90 tablet 3     Sig: Take 1 tablet by mouth Daily.          Return in about 8 weeks (around 6/27/2022).

## 2022-05-05 ENCOUNTER — PATIENT ROUNDING (BHMG ONLY) (OUTPATIENT)
Dept: CARDIOLOGY | Facility: CLINIC | Age: 72
End: 2022-05-05

## 2022-05-05 NOTE — PROGRESS NOTES
A [employer] message has been sent to the patient for PATIENT ROUNDING with [facility].

## 2022-05-18 ENCOUNTER — HOSPITAL ENCOUNTER (OUTPATIENT)
Dept: CARDIOLOGY | Facility: HOSPITAL | Age: 72
Discharge: HOME | End: 2022-05-18
Admitting: INTERNAL MEDICINE

## 2022-05-18 VITALS
BODY MASS INDEX: 28.23 KG/M2 | HEIGHT: 64 IN | WEIGHT: 165.34 LBS | DIASTOLIC BLOOD PRESSURE: 65 MMHG | SYSTOLIC BLOOD PRESSURE: 134 MMHG | HEART RATE: 62 BPM

## 2022-05-18 DIAGNOSIS — I25.10 CORONARY ARTERY DISEASE INVOLVING NATIVE CORONARY ARTERY OF NATIVE HEART WITHOUT ANGINA PECTORIS: ICD-10-CM

## 2022-05-18 PROCEDURE — 25010000002 ATROPINE SULFATE: Performed by: INTERNAL MEDICINE

## 2022-05-18 PROCEDURE — 93350 STRESS TTE ONLY: CPT

## 2022-05-18 PROCEDURE — 93017 CV STRESS TEST TRACING ONLY: CPT

## 2022-05-18 PROCEDURE — 93350 STRESS TTE ONLY: CPT | Performed by: INTERNAL MEDICINE

## 2022-05-18 PROCEDURE — 0 DOBUTAMINE PER 250 MG: Performed by: INTERNAL MEDICINE

## 2022-05-18 PROCEDURE — 93352 ADMIN ECG CONTRAST AGENT: CPT | Performed by: INTERNAL MEDICINE

## 2022-05-18 PROCEDURE — 93018 CV STRESS TEST I&R ONLY: CPT | Performed by: INTERNAL MEDICINE

## 2022-05-18 PROCEDURE — 25010000002 PERFLUTREN 6.52 MG/ML SUSPENSION: Performed by: INTERNAL MEDICINE

## 2022-05-18 RX ORDER — DOBUTAMINE HYDROCHLORIDE 100 MG/100ML
10-50 INJECTION INTRAVENOUS CONTINUOUS
Status: DISCONTINUED | OUTPATIENT
Start: 2022-05-18 | End: 2022-05-19 | Stop reason: HOSPADM

## 2022-05-18 RX ADMIN — ATROPINE SULFATE 1.6 MG: 0.1 INJECTION, SOLUTION ENDOTRACHEAL; INTRAMUSCULAR; INTRAVENOUS; SUBCUTANEOUS at 10:13

## 2022-05-18 RX ADMIN — Medication 10 MCG/KG/MIN: at 09:40

## 2022-05-18 RX ADMIN — PERFLUTREN 8.48 MG: 6.52 INJECTION, SUSPENSION INTRAVENOUS at 09:34

## 2022-05-20 LAB
BH CV STRESS BP STAGE 1: NORMAL
BH CV STRESS BP STAGE 2: NORMAL
BH CV STRESS BP STAGE 3: NORMAL
BH CV STRESS BP STAGE 4: NORMAL
BH CV STRESS DOB - ATROPINE STAGE 3: 1
BH CV STRESS DOB - ATROPINE STAGE 4: 0.6
BH CV STRESS DOSE DOBUTAMINE STAGE 1: 10
BH CV STRESS DOSE DOBUTAMINE STAGE 2: 20
BH CV STRESS DOSE DOBUTAMINE STAGE 3: 30
BH CV STRESS DOSE DOBUTAMINE STAGE 4: 40
BH CV STRESS DURATION MIN STAGE 1: 3
BH CV STRESS DURATION MIN STAGE 2: 3
BH CV STRESS DURATION MIN STAGE 3: 3
BH CV STRESS DURATION MIN STAGE 4: 3
BH CV STRESS DURATION SEC STAGE 1: 0
BH CV STRESS DURATION SEC STAGE 2: 0
BH CV STRESS DURATION SEC STAGE 3: 0
BH CV STRESS DURATION SEC STAGE 4: 28
BH CV STRESS ECHO POST STRESS EJECTION FRACTION EF: 65 %
BH CV STRESS HR STAGE 1: 61
BH CV STRESS HR STAGE 2: 65
BH CV STRESS HR STAGE 3: 116
BH CV STRESS HR STAGE 4: 125
BH CV STRESS PROTOCOL 1: NORMAL
BH CV STRESS RECOVERY BP: NORMAL MMHG
BH CV STRESS RECOVERY HR: 78 BPM
BH CV STRESS STAGE 1: 1
BH CV STRESS STAGE 2: 2
BH CV STRESS STAGE 3: 3
BH CV STRESS STAGE 4: 4
LV EF 2D ECHO EST: 55 %
MAXIMAL PREDICTED HEART RATE: 147 BPM
PERCENT MAX PREDICTED HR: 85.03 %
STRESS BASELINE BP: NORMAL MMHG
STRESS BASELINE HR: 62 BPM
STRESS PERCENT HR: 100 %
STRESS POST EXERCISE DUR MIN: 12 MIN
STRESS POST EXERCISE DUR SEC: 28 SEC
STRESS POST PEAK BP: NORMAL MMHG
STRESS POST PEAK HR: 125 BPM
STRESS TARGET HR: 125 BPM

## 2022-06-23 ENCOUNTER — TRANSCRIBE ORDERS (OUTPATIENT)
Dept: ADMINISTRATIVE | Facility: HOSPITAL | Age: 72
End: 2022-06-23

## 2022-06-23 DIAGNOSIS — R31.9 HEMATURIA SYNDROME: Primary | ICD-10-CM

## 2022-06-24 ENCOUNTER — HOSPITAL ENCOUNTER (OUTPATIENT)
Dept: CARDIOLOGY | Facility: HOSPITAL | Age: 72
Discharge: HOME | End: 2022-06-24
Admitting: INTERNAL MEDICINE

## 2022-06-24 VITALS
WEIGHT: 165 LBS | DIASTOLIC BLOOD PRESSURE: 65 MMHG | SYSTOLIC BLOOD PRESSURE: 134 MMHG | HEIGHT: 64 IN | BODY MASS INDEX: 28.17 KG/M2

## 2022-06-24 DIAGNOSIS — R06.09 DOE (DYSPNEA ON EXERTION): ICD-10-CM

## 2022-06-24 PROCEDURE — 93306 TTE W/DOPPLER COMPLETE: CPT

## 2022-06-24 PROCEDURE — 93306 TTE W/DOPPLER COMPLETE: CPT | Performed by: INTERNAL MEDICINE

## 2022-06-25 LAB
BH CV ECHO LEFT VENTRICLE GLOBAL LONGITUDINAL STRAIN: -19 %
BH CV ECHO MEAS - ACS: 1.6 CM
BH CV ECHO MEAS - AO MAX PG: 9.5 MMHG
BH CV ECHO MEAS - AO MEAN PG: 6 MMHG
BH CV ECHO MEAS - AO ROOT DIAM: 2.8 CM
BH CV ECHO MEAS - AO V2 MAX: 154 CM/SEC
BH CV ECHO MEAS - AO V2 VTI: 38.8 CM
BH CV ECHO MEAS - AVA(I,D): 2.6 CM2
BH CV ECHO MEAS - EDV(CUBED): 105.8 ML
BH CV ECHO MEAS - EDV(MOD-SP2): 71.7 ML
BH CV ECHO MEAS - EDV(MOD-SP4): 64.6 ML
BH CV ECHO MEAS - EF(MOD-BP): 78.3 %
BH CV ECHO MEAS - EF(MOD-SP2): 82 %
BH CV ECHO MEAS - EF(MOD-SP4): 73.2 %
BH CV ECHO MEAS - ESV(CUBED): 17.4 ML
BH CV ECHO MEAS - ESV(MOD-SP2): 12.9 ML
BH CV ECHO MEAS - ESV(MOD-SP4): 17.3 ML
BH CV ECHO MEAS - FS: 45.2 %
BH CV ECHO MEAS - IVS/LVPW: 1.06 CM
BH CV ECHO MEAS - IVSD: 0.98 CM
BH CV ECHO MEAS - LA DIMENSION: 4.1 CM
BH CV ECHO MEAS - LAT PEAK E' VEL: 8.6 CM/SEC
BH CV ECHO MEAS - LV DIASTOLIC VOL/BSA (35-75): 35.8 CM2
BH CV ECHO MEAS - LV MASS(C)D: 156 GRAMS
BH CV ECHO MEAS - LV MAX PG: 4.9 MMHG
BH CV ECHO MEAS - LV MEAN PG: 3 MMHG
BH CV ECHO MEAS - LV SYSTOLIC VOL/BSA (12-30): 9.6 CM2
BH CV ECHO MEAS - LV V1 MAX: 111 CM/SEC
BH CV ECHO MEAS - LV V1 VTI: 28.8 CM
BH CV ECHO MEAS - LVIDD: 4.7 CM
BH CV ECHO MEAS - LVIDS: 2.6 CM
BH CV ECHO MEAS - LVOT AREA: 3.5 CM2
BH CV ECHO MEAS - LVOT DIAM: 2.1 CM
BH CV ECHO MEAS - LVPWD: 0.93 CM
BH CV ECHO MEAS - MED PEAK E' VEL: 8.3 CM/SEC
BH CV ECHO MEAS - MR MAX PG: 86 MMHG
BH CV ECHO MEAS - MR MAX VEL: 463 CM/SEC
BH CV ECHO MEAS - MV A MAX VEL: 92.1 CM/SEC
BH CV ECHO MEAS - MV DEC SLOPE: 362 CM/SEC2
BH CV ECHO MEAS - MV E MAX VEL: 93.8 CM/SEC
BH CV ECHO MEAS - MV E/A: 1.02
BH CV ECHO MEAS - MV P1/2T: 74.1 MSEC
BH CV ECHO MEAS - MVA(P1/2T): 3 CM2
BH CV ECHO MEAS - PA V2 MAX: 67.8 CM/SEC
BH CV ECHO MEAS - PULM A REVS DUR: 0.13 SEC
BH CV ECHO MEAS - PULM A REVS VEL: 33.8 CM/SEC
BH CV ECHO MEAS - PULM DIAS VEL: 66.4 CM/SEC
BH CV ECHO MEAS - PULM S/D: 0.88
BH CV ECHO MEAS - PULM SYS VEL: 58.7 CM/SEC
BH CV ECHO MEAS - RV MAX PG: 1.85 MMHG
BH CV ECHO MEAS - RV V1 MAX: 68 CM/SEC
BH CV ECHO MEAS - RV V1 VTI: 16.9 CM
BH CV ECHO MEAS - RVDD: 3.3 CM
BH CV ECHO MEAS - SI(MOD-SP2): 32.6 ML/M2
BH CV ECHO MEAS - SI(MOD-SP4): 26.2 ML/M2
BH CV ECHO MEAS - SV(LVOT): 99.8 ML
BH CV ECHO MEAS - SV(MOD-SP2): 58.8 ML
BH CV ECHO MEAS - SV(MOD-SP4): 47.3 ML
BH CV ECHO MEAS - TAPSE (>1.6): 2.2 CM
BH CV ECHO MEAS - TR MAX PG: 24 MMHG
BH CV ECHO MEAS - TR MAX VEL: 245 CM/SEC
BH CV ECHO MEASUREMENTS AVERAGE E/E' RATIO: 11.1
BH CV XLRA - RV BASE: 3.2 CM
BH CV XLRA - RV LENGTH: 6.1 CM
BH CV XLRA - RV MID: 1.56 CM
LEFT ATRIUM VOLUME INDEX: 40 ML/M2
LEFT ATRIUM VOLUME: 72.4 ML
MAXIMAL PREDICTED HEART RATE: 147 BPM
STRESS TARGET HR: 125 BPM

## 2022-06-28 ENCOUNTER — OFFICE VISIT (OUTPATIENT)
Dept: CARDIOLOGY | Facility: CLINIC | Age: 72
End: 2022-06-28

## 2022-06-28 ENCOUNTER — HOSPITAL ENCOUNTER (OUTPATIENT)
Dept: ULTRASOUND IMAGING | Facility: HOSPITAL | Age: 72
Discharge: HOME | End: 2022-06-28
Admitting: INTERNAL MEDICINE

## 2022-06-28 VITALS
WEIGHT: 166 LBS | OXYGEN SATURATION: 93 % | HEART RATE: 85 BPM | HEIGHT: 64 IN | DIASTOLIC BLOOD PRESSURE: 84 MMHG | SYSTOLIC BLOOD PRESSURE: 132 MMHG | BODY MASS INDEX: 28.34 KG/M2

## 2022-06-28 DIAGNOSIS — I25.10 CORONARY ARTERY CALCIFICATION: Primary | ICD-10-CM

## 2022-06-28 DIAGNOSIS — I25.84 CORONARY ARTERY CALCIFICATION: Primary | ICD-10-CM

## 2022-06-28 DIAGNOSIS — I47.1 PSVT (PAROXYSMAL SUPRAVENTRICULAR TACHYCARDIA): ICD-10-CM

## 2022-06-28 DIAGNOSIS — R31.9 HEMATURIA SYNDROME: ICD-10-CM

## 2022-06-28 DIAGNOSIS — I10 PRIMARY HYPERTENSION: ICD-10-CM

## 2022-06-28 DIAGNOSIS — E78.2 MIXED HYPERLIPIDEMIA: ICD-10-CM

## 2022-06-28 PROBLEM — I47.10 PSVT (PAROXYSMAL SUPRAVENTRICULAR TACHYCARDIA): Status: ACTIVE | Noted: 2022-06-28

## 2022-06-28 PROCEDURE — 76775 US EXAM ABDO BACK WALL LIM: CPT

## 2022-06-28 PROCEDURE — 99214 OFFICE O/P EST MOD 30 MIN: CPT | Performed by: NURSE PRACTITIONER

## 2022-06-28 RX ORDER — DILTIAZEM HYDROCHLORIDE 120 MG/1
120 CAPSULE, COATED, EXTENDED RELEASE ORAL DAILY
COMMUNITY

## 2022-06-28 NOTE — PROGRESS NOTES
Chief Complaint  Coronary Artery Disease (8wk F/U. ) and Results (SE/Echo/Holter)    Subjective          Liudmila Ga presents to [facility] for routine follow-up of outpatient testing.  Dobutamine stress echo on 5/18/2022 was low risk for ischemia.  14-day Holter monitor revealed predominantly sinus rhythm with rare supraventricular ectopic beats with an APC burden of less than 1%, rare premature ventricular contractions with a PVC burden of less than 1%, single 5 beat run of supraventricular tachycardia, no significant ectopy, no significant pauses and no correlated arrhythmia.  2D echo on 6/24/2022 revealed normal left ventricular systolic function with ejection fraction 61 to 65%, indeterminate left ventricular diastolic function, normal global longitudinal LV strain of -19%, mildly increased left atrial volume and no significant valvular heart disease.  She has coronary artery calcification, paroxysmal supraventricular tachycardia, hypertension, hyperlipidemia, hypothyroid, arthritis and GERD. She reports dyspnea with moderate exertion since having Covid-19 earlier in the year. Patient denies chest pain, palpitations, dizziness, syncope, orthopnea, PND, edema or decreased stamina.  Patient denies any signs of bleeding.    Hypertension  This is a chronic problem. The current episode started more than 1 year ago. The problem is controlled. Pertinent negatives include no anxiety, blurred vision, chest pain, headaches, malaise/fatigue, neck pain, orthopnea, palpitations, peripheral edema, PND, shortness of breath or sweats. Risk factors for coronary artery disease include dyslipidemia. Current antihypertension treatment includes beta blockers and calcium channel blockers. The current treatment provides significant improvement. Identifiable causes of hypertension include a thyroid problem.   Hyperlipidemia  This is a chronic problem. The current episode started more than 1 year  "ago. Exacerbating diseases include hypothyroidism. Pertinent negatives include no chest pain or shortness of breath. Current antihyperlipidemic treatment includes statins. Risk factors for coronary artery disease include post-menopausal, hypertension and dyslipidemia.       Objective     Current Outpatient Medications:   •  atenolol (TENORMIN) 25 MG tablet, Take 25 mg by mouth Daily., Disp: , Rfl:   •  Biotin 54791 MCG tablet, Take  by mouth., Disp: , Rfl:   •  bisacodyl (DULCOLAX) 5 MG EC tablet, Take 5 mg by mouth Daily As Needed for Constipation., Disp: , Rfl:   •  cetirizine (zyrTEC) 10 MG tablet, Take 10 mg by mouth Daily., Disp: , Rfl:   •  Cholecalciferol (VITAMIN D3) 2000 units tablet, Take  by mouth., Disp: , Rfl:   •  diclofenac (VOLTAREN) 75 MG EC tablet, Take 75 mg by mouth 2 (Two) Times a Day., Disp: , Rfl:   •  dilTIAZem CD (CARDIZEM CD) 120 MG 24 hr capsule, Take 120 mg by mouth Daily., Disp: , Rfl:   •  DULoxetine (CYMBALTA) 30 MG capsule, Take 30 mg by mouth Daily., Disp: , Rfl:   •  fluticasone (FLONASE) 50 MCG/ACT nasal spray, 2 sprays into each nostril Daily., Disp: , Rfl:   •  Lysine 500 MG capsule, Take 500 mg by mouth., Disp: , Rfl:   •  metoclopramide (REGLAN) 5 MG tablet, Take 5 mg by mouth 2 (Two) Times a Day., Disp: , Rfl:   •  Omega-3 Fatty Acids (FISH OIL) 1200 MG capsule capsule, Take 1,200 mg by mouth Daily., Disp: , Rfl:   •  pantoprazole (PROTONIX) 40 MG EC tablet, Take 40 mg by mouth Daily., Disp: , Rfl:   •  rosuvastatin (CRESTOR) 5 MG tablet, Take 1 tablet by mouth Daily., Disp: 90 tablet, Rfl: 3  •  SYNTHROID 25 MCG tablet, , Disp: , Rfl:   •  vitamin C (ASCORBIC ACID) 500 MG tablet, Take 1,000 mg by mouth Daily., Disp: , Rfl:   •  Zinc 50 MG capsule, Take  by mouth., Disp: , Rfl:   •  zolpidem (AMBIEN) 10 MG tablet, Take 10 mg by mouth At Night As Needed for Sleep., Disp: , Rfl:   Vital Signs:   /84   Pulse 85   Ht 162.6 cm (64\")   Wt 75.3 kg (166 lb)   SpO2 93%   BMI " 28.49 kg/m²     Vitals and nursing note reviewed.   Constitutional:       General: Not in acute distress.     Appearance: Normal and healthy appearance. Well-developed, overweight and not in distress. Not diaphoretic.   Eyes:      General: Lids are normal.         Right eye: No discharge.         Left eye: No discharge.      Conjunctiva/sclera: Conjunctivae normal.      Pupils: Pupils are equal, round, and reactive to light.   HENT:      Head: Normocephalic and atraumatic.      Jaw: There is normal jaw occlusion.      Right Ear: External ear normal.      Left Ear: External ear normal.      Nose: Nose normal.   Neck:      Thyroid: No thyromegaly.      Vascular: No carotid bruit, JVD or JVR. JVD normal.      Trachea: Trachea normal. No tracheal deviation.   Pulmonary:      Effort: Pulmonary effort is normal. No respiratory distress.      Breath sounds: Normal breath sounds. No decreased breath sounds. No wheezing. No rhonchi. No rales.   Chest:      Chest wall: Not tender to palpatation.   Cardiovascular:      PMI at left midclavicular line. Normal rate. Regular rhythm. Normal S1. Normal S2.      Murmurs: There is no murmur.      No gallop. No click. No rub.   Pulses:     Intact distal pulses. No decreased pulses.   Edema:     Peripheral edema absent.   Abdominal:      General: Bowel sounds are normal. There is no distension.      Palpations: Abdomen is soft.      Tenderness: There is no abdominal tenderness.   Musculoskeletal: Normal range of motion.         General: No tenderness or deformity.      Cervical back: Normal range of motion and neck supple. Skin:     General: Skin is warm and dry.      Coloration: Skin is not pale.      Findings: No erythema or rash.   Neurological:      General: No focal deficit present.      Mental Status: Alert, oriented to person, place, and time and oriented to person, place and time.   Psychiatric:         Attention and Perception: Attention and perception normal.         Mood and  Affect: Mood and affect normal.         Speech: Speech normal.         Behavior: Behavior normal.         Thought Content: Thought content normal.         Cognition and Memory: Cognition and memory normal.         Judgment: Judgment normal.        Result Review :   The following data was reviewed by: ALTHEA Merrill on 06/28/2022:      Data reviewed: Cardiology studies dobutamine stress echo 5/18/22, 14 day holter monitor 5/30/22 and 2d echo 6/24/22          Assessment and Plan    Diagnoses and all orders for this visit:    1. Coronary artery calcification (Primary)- coronary calcium score of 175.8 on CT cardiac calcium score 4/25/2022.  Negative dobutamine stress echo 5/18/2022.    2. Primary hypertension-blood pressure is trivially elevated in office today. Pt does not monitor routinely at home.  Continue atenolol and diltiazem.  Monitor and record daily blood pressure. Report readings consistently higher than 130/90 or consistently lower than 100/60.     3. Mixed hyperlipidemia-management per PCP.  Continue rosuvastatin.    4. PSVT (paroxysmal supraventricular tachycardia) (HCC)- single 5 beat run on recent 14 day holter monitor. Stable. Continue atenolol.       Follow Up   Return in about 6 months (around 12/28/2022) for Next scheduled follow up with Dr. robins.  Patient was given instructions and counseling regarding her condition or for health maintenance advice. Please see specific information pulled into the AVS if appropriate.

## 2022-07-15 ENCOUNTER — TRANSCRIBE ORDERS (OUTPATIENT)
Dept: ADMINISTRATIVE | Facility: HOSPITAL | Age: 72
End: 2022-07-15

## 2022-07-15 DIAGNOSIS — R31.9 HEMATURIA, UNSPECIFIED TYPE: Primary | ICD-10-CM

## 2022-07-20 ENCOUNTER — LAB (OUTPATIENT)
Dept: LAB | Facility: HOSPITAL | Age: 72
End: 2022-07-20

## 2022-07-20 DIAGNOSIS — R31.9 HEMATURIA, UNSPECIFIED TYPE: ICD-10-CM

## 2022-07-20 LAB
BACTERIA UR QL AUTO: ABNORMAL /HPF
BILIRUB UR QL STRIP: NEGATIVE
CLARITY UR: ABNORMAL
COLOR UR: YELLOW
GLUCOSE UR STRIP-MCNC: NEGATIVE MG/DL
HGB UR QL STRIP.AUTO: ABNORMAL
HYALINE CASTS UR QL AUTO: ABNORMAL /LPF
KETONES UR QL STRIP: NEGATIVE
LEUKOCYTE ESTERASE UR QL STRIP.AUTO: ABNORMAL
NITRITE UR QL STRIP: NEGATIVE
PH UR STRIP.AUTO: 5.5 [PH] (ref 5–8)
PROT UR QL STRIP: ABNORMAL
RBC # UR STRIP: ABNORMAL /HPF
REF LAB TEST METHOD: ABNORMAL
SP GR UR STRIP: 1.02 (ref 1–1.03)
SQUAMOUS #/AREA URNS HPF: ABNORMAL /HPF
UROBILINOGEN UR QL STRIP: ABNORMAL
WBC # UR STRIP: ABNORMAL /HPF
YEAST URNS QL MICRO: ABNORMAL /HPF

## 2022-07-20 PROCEDURE — 81001 URINALYSIS AUTO W/SCOPE: CPT

## 2022-07-27 NOTE — PROGRESS NOTES
Chief Complaint  Renal calculus    Subjective          Liudmila Ga presents to [facility] UROLOGY   Patient left renal calculus.  This was done to evaluate microscopic hematuria and low back pain.  She does have a prior history of stones.        Current Outpatient Medications:   •  atenolol (TENORMIN) 25 MG tablet, Take 25 mg by mouth Daily., Disp: , Rfl:   •  Biotin 74040 MCG tablet, Take  by mouth., Disp: , Rfl:   •  bisacodyl (DULCOLAX) 5 MG EC tablet, Take 5 mg by mouth Daily As Needed for Constipation., Disp: , Rfl:   •  cetirizine (zyrTEC) 10 MG tablet, Take 10 mg by mouth Daily., Disp: , Rfl:   •  Cholecalciferol (VITAMIN D3) 2000 units tablet, Take  by mouth., Disp: , Rfl:   •  ciprofloxacin (CIPRO) 500 MG tablet, TAKE 1 TABLET TWICE A DAY FOR 10 DAYS., Disp: , Rfl:   •  diclofenac (VOLTAREN) 75 MG EC tablet, Take 75 mg by mouth 2 (Two) Times a Day., Disp: , Rfl:   •  dilTIAZem CD (CARDIZEM CD) 120 MG 24 hr capsule, Take 120 mg by mouth Daily., Disp: , Rfl:   •  DULoxetine (CYMBALTA) 30 MG capsule, Take 30 mg by mouth Daily., Disp: , Rfl:   •  fluticasone (FLONASE) 50 MCG/ACT nasal spray, 2 sprays into each nostril Daily., Disp: , Rfl:   •  Lysine 500 MG capsule, Take 500 mg by mouth., Disp: , Rfl:   •  metoclopramide (REGLAN) 5 MG tablet, Take 5 mg by mouth 2 (Two) Times a Day., Disp: , Rfl:   •  Omega-3 Fatty Acids (FISH OIL) 1200 MG capsule capsule, Take 1,200 mg by mouth Daily., Disp: , Rfl:   •  pantoprazole (PROTONIX) 40 MG EC tablet, Take 40 mg by mouth Daily., Disp: , Rfl:   •  rosuvastatin (CRESTOR) 5 MG tablet, Take 1 tablet by mouth Daily., Disp: 90 tablet, Rfl: 3  •  SYNTHROID 25 MCG tablet, , Disp: , Rfl:   •  vitamin C (ASCORBIC ACID) 500 MG tablet, Take 1,000 mg by mouth Daily., Disp: , Rfl:   •  Zinc 50 MG capsule, Take  by mouth., Disp: , Rfl:   •  zolpidem (AMBIEN) 10 MG tablet, Take 10 mg by mouth At Night As Needed for Sleep., Disp: , Rfl:   Past Medical  "History:   Diagnosis Date   • Arthritis    • Colon polyps    • Disease of thyroid gland    • GERD (gastroesophageal reflux disease)    • Hiatal hernia    • Kidney stone    • Osteopenia    • Overactive bladder    • Vitamin D deficiency      Past Surgical History:   Procedure Laterality Date   • CHOLECYSTECTOMY  2004   • COLONOSCOPY  08/20/2014    HYPERPLASTIC POLYP ILEOCECAL VALVE AND HEPATIC FLEXURE, DIVERTICULA IN SIGMOID COLON   • COLONOSCOPY  02/04/2014    TUBULAR ADENOMA CECAL POLYP, TUBULAR ADENOMA RECTAL POLYP, HYPERPLASTICS POLYPS AT ILEOCECAL VALVE AND RECTAL.   • COLONOSCOPY N/A 12/11/2017    2 Sessile serrated adenomas large intestine and hepatic flexure, 2 tubular adenomas cecum and at 60 cm  repeat exam in 3 years   • COLONOSCOPY N/A 7/23/2020    Procedure: COLONOSCOPY WITH ANESTHESIA;  Surgeon: Stevan Mullins MD;  Location: [facility] ENDOSCOPY;  Service: Gastroenterology;  Laterality: N/A;  pre: hx adenomatous colon polyp  post: diverticulolsis  Meals, Nigel Chacon MD   • ENDOSCOPY  12/22/2015    HIATAL HERNIA, MILD SCHATZKI RING DILATED   • ENDOSCOPY N/A 11/7/2017    HH, dilated otherwise normal exam   • ENDOSCOPY N/A 8/19/2019    Small HH, NOn-obstructing Schatzki ring dilated, food residue in stomach   • ENDOSCOPY N/A 10/3/2019    Gastritis otherwise normal exam   • HYSTERECTOMY  06/2011    TOTAL   • TONSILLECTOMY             Review  of systems  Constitutional: Negative for chills or fever.   Gastrointestinal: Negative for abdominal pain, anal bleeding or blood in stool.           Objective   PHYSICAL EXAM  Vital Signs:   Temp 97.9 °F (36.6 °C)   Ht 162.6 cm (64\")   Wt 75.6 kg (166 lb 9.6 oz)   BMI 28.60 kg/m²     Constitutional: Patient is without distress or deformity.  Vital signs are reviewed as above.    Neuro: No confusion; No disorientation; Alert and oriented  Pulmonary: No respiratory distress.   Skin: No pallor or diaphoresis      DATA  Result Review :              Results for orders " "placed or performed in visit on 07/28/22   POC Urinalysis Dipstick, Multipro    Specimen: Urine   Result Value Ref Range    Color Yellow Yellow, Straw, Dark Yellow, Chloe    Clarity, UA Clear Clear    Glucose, UA Negative Negative mg/dL    Bilirubin Negative Negative    Ketones, UA Negative Negative    Specific Gravity  1.020 1.005 - 1.030    Blood, UA Large (A) Negative    pH, Urine 5.0 5.0 - 8.0    Protein, POC Negative Negative mg/dL    Urobilinogen, UA Normal Normal    Nitrite, UA Negative Negative    Leukocytes Trace (A) Negative        XR Abdomen KUB (07/27/2022 17:05)   US Renal Bilateral (06/28/2022 12:59)    The images for the above \"link(s)\" were made available to me to review independently.  I also reviewed the radiologist's report described above with regard to the urologic findings. My interpretation is as follows:  Patient does have a left renal calculus that appears to be significant size.  There is no hydronephrosis.  I measured this at 18 mm on the KUB.  /Ronny Huff MD    Data Reviewed and/or ordered during today's visit to evaluate and manage:   DATA ORDERED/REVIEWED THIS VISIT: URINALYSIS, CT STONE PROTOCOL, RENAL US  and KUB         ASSESSMENT AND PLAN          Problem List Items Addressed This Visit    None     Visit Diagnoses     Renal calculus, left    -  Primary    Relevant Orders    POC Urinalysis Dipstick, Multipro (Completed)    XR Abdomen KUB (Completed)    CT Abdomen Pelvis Without Contrast      Needs CT to better delineate her upper tract anatomy.  We also need a better assessment of size.  Her KUB shows the stone to be about 18 x 10 mm which means she could be a reasonable candidate for ESWL provided she is willing to have a ureteral stent and understand it could be a staged procedure.  The other option of course be to do a PCNL on her which I think would be optimal if this stone is over 2 to 2.5 cm.        FOLLOW UP     Return in about 2 weeks (around 8/11/2022).        (Please " note that portions of this note were completed with a voice recognition program.)  Ronny Huff MD  07/28/22  15:11 CDT

## 2022-07-28 ENCOUNTER — HOSPITAL ENCOUNTER (OUTPATIENT)
Dept: GENERAL RADIOLOGY | Facility: HOSPITAL | Age: 72
Discharge: HOME | End: 2022-07-28
Admitting: UROLOGY

## 2022-07-28 ENCOUNTER — PATIENT ROUNDING (BHMG ONLY) (OUTPATIENT)
Dept: UROLOGY | Facility: CLINIC | Age: 72
End: 2022-07-28

## 2022-07-28 ENCOUNTER — OFFICE VISIT (OUTPATIENT)
Dept: UROLOGY | Facility: CLINIC | Age: 72
End: 2022-07-28

## 2022-07-28 VITALS — HEIGHT: 64 IN | TEMPERATURE: 97.9 F | WEIGHT: 166.6 LBS | BODY MASS INDEX: 28.44 KG/M2

## 2022-07-28 DIAGNOSIS — N20.0 RENAL CALCULUS, LEFT: Primary | ICD-10-CM

## 2022-07-28 LAB
BILIRUB BLD-MCNC: NEGATIVE MG/DL
CLARITY, POC: CLEAR
COLOR UR: YELLOW
GLUCOSE UR STRIP-MCNC: NEGATIVE MG/DL
KETONES UR QL: NEGATIVE
LEUKOCYTE EST, POC: ABNORMAL
NITRITE UR-MCNC: NEGATIVE MG/ML
PH UR: 5 [PH] (ref 5–8)
PROT UR STRIP-MCNC: NEGATIVE MG/DL
RBC # UR STRIP: ABNORMAL /UL
SP GR UR: 1.02 (ref 1–1.03)
UROBILINOGEN UR QL: NORMAL

## 2022-07-28 PROCEDURE — 74018 RADEX ABDOMEN 1 VIEW: CPT

## 2022-07-28 PROCEDURE — 99204 OFFICE O/P NEW MOD 45 MIN: CPT | Performed by: UROLOGY

## 2022-07-28 PROCEDURE — 81001 URINALYSIS AUTO W/SCOPE: CPT | Performed by: UROLOGY

## 2022-07-28 RX ORDER — CIPROFLOXACIN 500 MG/1
TABLET, FILM COATED ORAL
COMMUNITY
Start: 2022-07-21 | End: 2022-08-23

## 2022-07-28 NOTE — PROGRESS NOTES
July 28, 2022    Hello, may I speak with Liudmila Ga?    My name is Audrey    I am  with [facility]  [address], [address]  [place] [id number] [phone].    Before we get started may I verify your date of birth? 1950    I am calling to officially welcome you to our practice and ask about your recent visit. Is this a good time to talk? yes    Tell me about your visit with us. What things went well?  It all went well.       We're always looking for ways to make our patients' experiences even better. Do you have recommendations on ways we may improve?  no    Overall were you satisfied with your first visit to our practice? yes       I appreciate you taking the time to speak with me today. Is there anything else I can do for you? no      Thank you, and have a great day.

## 2022-08-19 ENCOUNTER — HOSPITAL ENCOUNTER (OUTPATIENT)
Dept: CT IMAGING | Facility: HOSPITAL | Age: 72
Discharge: HOME | End: 2022-08-19
Admitting: UROLOGY

## 2022-08-19 DIAGNOSIS — N20.0 RENAL CALCULUS, LEFT: ICD-10-CM

## 2022-08-19 PROCEDURE — 74176 CT ABD & PELVIS W/O CONTRAST: CPT

## 2022-08-22 NOTE — PROGRESS NOTES
Chief Complaint  Left renal calculus    Subjective          Liudmila Ga presents to [facility] UROLOGY   Patient returns today with imaging regarding her large left renal calculus.  No hematuria or flank pain since have seen her last        Current Outpatient Medications:   •  atenolol (TENORMIN) 25 MG tablet, Take 25 mg by mouth Daily., Disp: , Rfl:   •  Biotin 96295 MCG tablet, Take  by mouth., Disp: , Rfl:   •  bisacodyl (DULCOLAX) 5 MG EC tablet, Take 5 mg by mouth Daily As Needed for Constipation., Disp: , Rfl:   •  cetirizine (zyrTEC) 10 MG tablet, Take 10 mg by mouth Daily., Disp: , Rfl:   •  Cholecalciferol (VITAMIN D3) 2000 units tablet, Take  by mouth., Disp: , Rfl:   •  diclofenac (VOLTAREN) 75 MG EC tablet, Take 75 mg by mouth 2 (Two) Times a Day., Disp: , Rfl:   •  dilTIAZem CD (CARDIZEM CD) 120 MG 24 hr capsule, Take 120 mg by mouth Daily., Disp: , Rfl:   •  DULoxetine (CYMBALTA) 30 MG capsule, Take 30 mg by mouth Daily., Disp: , Rfl:   •  fluticasone (FLONASE) 50 MCG/ACT nasal spray, 2 sprays into each nostril Daily., Disp: , Rfl:   •  Lysine 500 MG capsule, Take 500 mg by mouth., Disp: , Rfl:   •  metoclopramide (REGLAN) 5 MG tablet, Take 5 mg by mouth 2 (Two) Times a Day., Disp: , Rfl:   •  Omega-3 Fatty Acids (FISH OIL) 1200 MG capsule capsule, Take 1,200 mg by mouth Daily., Disp: , Rfl:   •  pantoprazole (PROTONIX) 40 MG EC tablet, Take 40 mg by mouth Daily., Disp: , Rfl:   •  rosuvastatin (CRESTOR) 5 MG tablet, Take 1 tablet by mouth Daily., Disp: 90 tablet, Rfl: 3  •  vitamin C (ASCORBIC ACID) 500 MG tablet, Take 1,000 mg by mouth Daily., Disp: , Rfl:   •  Zinc 50 MG capsule, Take  by mouth., Disp: , Rfl:   •  zolpidem (AMBIEN) 10 MG tablet, Take 10 mg by mouth At Night As Needed for Sleep., Disp: , Rfl:   •  ciprofloxacin (CIPRO) 500 MG tablet, TAKE 1 TABLET TWICE A DAY FOR 10 DAYS., Disp: , Rfl:   •  SYNTHROID 25 MCG tablet, , Disp: , Rfl:   Past Medical History:  "  Diagnosis Date   • Arthritis    • Colon polyps    • Disease of thyroid gland    • GERD (gastroesophageal reflux disease)    • Hiatal hernia    • Kidney stone    • Osteopenia    • Overactive bladder    • Vitamin D deficiency      Past Surgical History:   Procedure Laterality Date   • CHOLECYSTECTOMY  2004   • COLONOSCOPY  08/20/2014    HYPERPLASTIC POLYP ILEOCECAL VALVE AND HEPATIC FLEXURE, DIVERTICULA IN SIGMOID COLON   • COLONOSCOPY  02/04/2014    TUBULAR ADENOMA CECAL POLYP, TUBULAR ADENOMA RECTAL POLYP, HYPERPLASTICS POLYPS AT ILEOCECAL VALVE AND RECTAL.   • COLONOSCOPY N/A 12/11/2017    2 Sessile serrated adenomas large intestine and hepatic flexure, 2 tubular adenomas cecum and at 60 cm  repeat exam in 3 years   • COLONOSCOPY N/A 7/23/2020    Procedure: COLONOSCOPY WITH ANESTHESIA;  Surgeon: Stevan Mullins MD;  Location: [facility] ENDOSCOPY;  Service: Gastroenterology;  Laterality: N/A;  pre: hx adenomatous colon polyp  post: diverticulolsis  Meals, Nigel Chacon MD   • ENDOSCOPY  12/22/2015    HIATAL HERNIA, MILD SCHATZKI RING DILATED   • ENDOSCOPY N/A 11/7/2017    HH, dilated otherwise normal exam   • ENDOSCOPY N/A 8/19/2019    Small HH, NOn-obstructing Schatzki ring dilated, food residue in stomach   • ENDOSCOPY N/A 10/3/2019    Gastritis otherwise normal exam   • HYSTERECTOMY  06/2011    TOTAL   • TONSILLECTOMY             Review  of systems  Constitutional: Negative for chills or fever.   Gastrointestinal: Negative for abdominal pain, anal bleeding or blood in stool.           Objective   PHYSICAL EXAM  Vital Signs:   Temp 97.4 °F (36.3 °C)   Ht 162.6 cm (64\")   Wt 76.9 kg (169 lb 9.6 oz)   BMI 29.11 kg/m²     Constitutional: Patient is without distress or deformity.  Vital signs are reviewed as above.    Neuro: No confusion; No disorientation; Alert and oriented  Pulmonary: No respiratory distress.   Skin: No pallor or diaphoresis      DATA  Result Review :              Results for orders placed or " "performed in visit on 08/23/22   POC Urinalysis Dipstick, Multipro    Specimen: Urine   Result Value Ref Range    Color Yellow Yellow, Straw, Dark Yellow, Chloe    Clarity, UA Clear Clear    Glucose, UA Negative Negative mg/dL    Bilirubin Negative Negative    Ketones, UA Negative Negative    Specific Gravity  1.030 1.005 - 1.030    Blood, UA Large (A) Negative    pH, Urine 5.5 5.0 - 8.0    Protein,  mg/dL (A) Negative mg/dL    Urobilinogen, UA 0.2 E.U./dL Normal, 0.2 E.U./dL    Nitrite, UA Negative Negative    Leukocytes Trace (A) Negative     CT Abdomen Pelvis Without Contrast (08/19/2022 11:48)    The images for the above \"link(s)\" were made available to me to review independently.  I also reviewed the radiologist's report described above with regard to the urologic findings. My interpretation is as follows:  Patient has what is essentially a left staghorn calculus.  Majority of stone burden is in the lower pole but she also has interpolar calyces and the upper pole infundibulum involved.  The entire renal pelvis appears to be stone.  She is moderately dilated.  The ureters without dilatation.  I went over this in detail with the patient.  /Ronny Huff MD         ASSESSMENT AND PLAN          Problem List Items Addressed This Visit    None     Visit Diagnoses     Left Staghorn renal calculus    -  Primary    Relevant Orders    POC Urinalysis Dipstick, Multipro (Completed)    Ambulatory Referral to Urology      Patient has large burden renal calculus.  She is going to need percutaneous approach to this.  Unfortunately we do not have interventional radiology at our institution.  This may require more than 1 access so I talked to her about this.  She would like to go to [place] since she has family there.    I went over imaging studies with the patient.  We discussed etiology of staghorn calculi including the role of infection.  We talked about preventive measures and eventual need for evaluation with " 24-hour urine.  We talked about surgical options and why the best approach is a percutaneous approach.  Team to contact [facility] to treat the patient.    I spent 30 minutes caring for Liudmila on this date of service. This time includes time spent by me in the following activities:preparing for the visit, reviewing tests, counseling and educating the patient/family/caregiver, referring and communicating with other health care professionals , documenting information in the medical record and independently interpreting results and communicating that information with the patient/family/caregiver  FOLLOW UP     No follow-ups on file.        (Please note that portions of this note were completed with a voice recognition program.)  Ronny Huff MD  08/23/22  16:08 CDT

## 2022-08-23 ENCOUNTER — OFFICE VISIT (OUTPATIENT)
Dept: UROLOGY | Facility: CLINIC | Age: 72
End: 2022-08-23

## 2022-08-23 VITALS — HEIGHT: 64 IN | TEMPERATURE: 97.4 F | WEIGHT: 169.6 LBS | BODY MASS INDEX: 28.95 KG/M2

## 2022-08-23 DIAGNOSIS — N20.0 STAGHORN RENAL CALCULUS: Primary | ICD-10-CM

## 2022-08-23 LAB
BILIRUB BLD-MCNC: NEGATIVE MG/DL
CLARITY, POC: CLEAR
COLOR UR: YELLOW
GLUCOSE UR STRIP-MCNC: NEGATIVE MG/DL
KETONES UR QL: NEGATIVE
LEUKOCYTE EST, POC: ABNORMAL
NITRITE UR-MCNC: NEGATIVE MG/ML
PH UR: 5.5 [PH] (ref 5–8)
PROT UR STRIP-MCNC: ABNORMAL MG/DL
RBC # UR STRIP: ABNORMAL /UL
SP GR UR: 1.03 (ref 1–1.03)
UROBILINOGEN UR QL: ABNORMAL

## 2022-08-23 PROCEDURE — 81003 URINALYSIS AUTO W/O SCOPE: CPT | Performed by: UROLOGY

## 2022-08-23 PROCEDURE — 99214 OFFICE O/P EST MOD 30 MIN: CPT | Performed by: UROLOGY

## 2023-03-22 NOTE — PROGRESS NOTES
Chief Complaint  Heart Problem (Coronary artery calcification)    Subjective          Liudmila Ga presents to [facility] CARDIOLOGY [id number] for routine follow-up.  She has coronary artery calcification, paroxysmal supraventricular tachycardia, hypertension, hyperlipidemia, hypothyroid, arthritis and GERD. She complains of decreased stamina since having Covid-19 for a second time several months ago. She complains of exertional pain between her shoulder blades that resolves with rest. Patient denies shortness of breath, palpitations, dizziness, syncope, orthopnea, PND, or edema.  Patient denies any signs of bleeding.    Hypertension  This is a chronic problem. The current episode started more than 1 year ago. The problem is controlled. Associated symptoms include chest pain and malaise/fatigue. Pertinent negatives include no anxiety, blurred vision, headaches, neck pain, orthopnea, palpitations, peripheral edema, PND, shortness of breath or sweats. Risk factors for coronary artery disease include dyslipidemia. Current antihypertension treatment includes beta blockers and calcium channel blockers. The current treatment provides significant improvement. Identifiable causes of hypertension include a thyroid problem.   Hyperlipidemia  This is a chronic problem. The current episode started more than 1 year ago. Exacerbating diseases include hypothyroidism. Associated symptoms include chest pain. Pertinent negatives include no shortness of breath. Current antihyperlipidemic treatment includes statins. Risk factors for coronary artery disease include post-menopausal, hypertension and dyslipidemia.       Objective     Current Outpatient Medications:   •  atenolol (TENORMIN) 25 MG tablet, Take 1 tablet by mouth Daily., Disp: , Rfl:   •  Biotin 32930 MCG tablet, Take  by mouth., Disp: , Rfl:   •  bisacodyl (DULCOLAX) 5 MG EC tablet, Take 1 tablet by mouth Daily As Needed for Constipation., Disp: ,  "Rfl:   •  cetirizine (zyrTEC) 10 MG tablet, Take 1 tablet by mouth Daily., Disp: , Rfl:   •  Cholecalciferol (VITAMIN D3) 2000 units tablet, Take  by mouth., Disp: , Rfl:   •  diclofenac (VOLTAREN) 75 MG EC tablet, Take 1 tablet by mouth 2 (Two) Times a Day., Disp: , Rfl:   •  dilTIAZem CD (CARDIZEM CD) 120 MG 24 hr capsule, Take 1 capsule by mouth Daily., Disp: , Rfl:   •  DULoxetine (CYMBALTA) 30 MG capsule, Take 1 capsule by mouth 3 (Three) Times a Week., Disp: , Rfl:   •  fluticasone (FLONASE) 50 MCG/ACT nasal spray, 2 sprays into the nostril(s) as directed by provider Daily., Disp: , Rfl:   •  Lysine 500 MG capsule, Take 500 mg by mouth., Disp: , Rfl:   •  metoclopramide (REGLAN) 5 MG tablet, Take 1 tablet by mouth 2 (Two) Times a Day., Disp: , Rfl:   •  Omega-3 Fatty Acids (FISH OIL) 1200 MG capsule capsule, Take 1 capsule by mouth Daily., Disp: , Rfl:   •  pantoprazole (PROTONIX) 40 MG EC tablet, Take 1 tablet by mouth Daily., Disp: , Rfl:   •  potassium citrate (UROCIT-K) 10 MEQ (1080 MG) CR tablet, 1 tablet 2 (Two) Times a Day., Disp: , Rfl:   •  rosuvastatin (CRESTOR) 5 MG tablet, Take 1 tablet by mouth Daily., Disp: 90 tablet, Rfl: 3  •  Synthroid 50 MCG tablet, , Disp: , Rfl:   •  vitamin C (ASCORBIC ACID) 500 MG tablet, Take 2 tablets by mouth Daily., Disp: , Rfl:   •  Zinc 50 MG capsule, Take  by mouth., Disp: , Rfl:   •  zolpidem (AMBIEN) 10 MG tablet, Take 1 tablet by mouth Every Night., Disp: , Rfl:   Vital Signs:   /66   Pulse 59   Ht 162.6 cm (64\")   Wt 77.6 kg (171 lb)   SpO2 98%   BMI 29.35 kg/m²     Vitals and nursing note reviewed.   Constitutional:       General: Not in acute distress.     Appearance: Normal and healthy appearance. Well-developed, overweight and not in distress. Not diaphoretic.   Eyes:      General: Lids are normal.         Right eye: No discharge.         Left eye: No discharge.      Conjunctiva/sclera: Conjunctivae normal.      Pupils: Pupils are equal, round, " and reactive to light.   HENT:      Head: Normocephalic and atraumatic.      Jaw: There is normal jaw occlusion.      Right Ear: External ear normal.      Left Ear: External ear normal.      Nose: Nose normal.   Neck:      Thyroid: No thyromegaly.      Vascular: No carotid bruit, JVD or JVR. JVD normal.      Trachea: Trachea normal. No tracheal deviation.   Pulmonary:      Effort: Pulmonary effort is normal. No respiratory distress.      Breath sounds: Normal breath sounds. No decreased breath sounds. No wheezing. No rhonchi. No rales.   Chest:      Chest wall: Not tender to palpatation.   Cardiovascular:      PMI at left midclavicular line. Bradycardia present. Regular rhythm. Normal S1. Normal S2.      Murmurs: There is no murmur.      No gallop. No click. No rub.   Pulses:     Intact distal pulses. No decreased pulses.   Edema:     Peripheral edema absent.   Abdominal:      General: Bowel sounds are normal. There is no distension.      Palpations: Abdomen is soft.      Tenderness: There is no abdominal tenderness.   Musculoskeletal: Normal range of motion.         General: No tenderness or deformity.      Cervical back: Normal range of motion and neck supple. Skin:     General: Skin is warm and dry.      Coloration: Skin is not pale.      Findings: No erythema or rash.   Neurological:      General: No focal deficit present.      Mental Status: Alert, oriented to person, place, and time and oriented to person, place and time.   Psychiatric:         Attention and Perception: Attention and perception normal.         Mood and Affect: Mood and affect normal.         Speech: Speech normal.         Behavior: Behavior normal.         Thought Content: Thought content normal.         Cognition and Memory: Cognition and memory normal.         Judgment: Judgment normal.        Result Review :   The following data was reviewed by: ALTHEA Merrill on 03/23/2023:      Data reviewed: Cardiology studies dobutamine stress  echo 5/18/22, 14 day holter monitor 5/30/22 and 2d echo 6/24/22     ECG 12 Lead    Date/Time: 3/23/2023 2:36 PM  Performed by: Leatha Montez APRN  Authorized by: Leatha Montez APRN   Comparison: not compared with previous ECG   Rhythm: sinus bradycardia  Rate: bradycardic  BPM: 59  QRS axis: normal    Clinical impression: abnormal EKG              Assessment and Plan    Diagnoses and all orders for this visit:    1. Coronary artery calcification (Primary)- coronary calcium score of 175.8 on CT cardiac calcium score 4/25/2022.  Negative dobutamine stress echo 5/18/2022.    2. Primary hypertension-blood pressure is well controlled.  Continue atenolol and diltiazem.  Monitor and record daily blood pressure. Report readings consistently higher than 130/90 or consistently lower than 100/60.     3. Mixed hyperlipidemia-management per PCP.  Continue rosuvastatin.    4. PSVT (paroxysmal supraventricular tachycardia) (HCC)- single 5 beat run on recent 14 day holter monitor. Stable. Continue atenolol.     5. Chest pain, atypical- check CT angiogram of the coronary arteries.     Follow Up   Return in about 6 months (around 9/23/2023) for Next scheduled follow up.  Patient was given instructions and counseling regarding her condition or for health maintenance advice. Please see specific information pulled into the AVS if appropriate.

## 2023-03-23 ENCOUNTER — OFFICE VISIT (OUTPATIENT)
Dept: CARDIOLOGY | Facility: CLINIC | Age: 73
End: 2023-03-23
Payer: MEDICARE

## 2023-03-23 VITALS
DIASTOLIC BLOOD PRESSURE: 66 MMHG | WEIGHT: 171 LBS | BODY MASS INDEX: 29.19 KG/M2 | HEIGHT: 64 IN | OXYGEN SATURATION: 98 % | SYSTOLIC BLOOD PRESSURE: 118 MMHG | HEART RATE: 59 BPM

## 2023-03-23 DIAGNOSIS — I25.84 CORONARY ARTERY CALCIFICATION: Primary | ICD-10-CM

## 2023-03-23 DIAGNOSIS — I47.1 PSVT (PAROXYSMAL SUPRAVENTRICULAR TACHYCARDIA): ICD-10-CM

## 2023-03-23 DIAGNOSIS — R07.89 CHEST PAIN, ATYPICAL: ICD-10-CM

## 2023-03-23 DIAGNOSIS — E78.2 MIXED HYPERLIPIDEMIA: ICD-10-CM

## 2023-03-23 DIAGNOSIS — I25.10 CORONARY ARTERY CALCIFICATION: Primary | ICD-10-CM

## 2023-03-23 DIAGNOSIS — I10 PRIMARY HYPERTENSION: ICD-10-CM

## 2023-03-23 PROCEDURE — 3078F DIAST BP <80 MM HG: CPT | Performed by: NURSE PRACTITIONER

## 2023-03-23 PROCEDURE — 93000 ELECTROCARDIOGRAM COMPLETE: CPT | Performed by: NURSE PRACTITIONER

## 2023-03-23 PROCEDURE — 3074F SYST BP LT 130 MM HG: CPT | Performed by: NURSE PRACTITIONER

## 2023-03-23 PROCEDURE — 1159F MED LIST DOCD IN RCRD: CPT | Performed by: NURSE PRACTITIONER

## 2023-03-23 PROCEDURE — 1160F RVW MEDS BY RX/DR IN RCRD: CPT | Performed by: NURSE PRACTITIONER

## 2023-03-23 PROCEDURE — 99214 OFFICE O/P EST MOD 30 MIN: CPT | Performed by: NURSE PRACTITIONER

## 2023-03-23 RX ORDER — POTASSIUM CITRATE 10 MEQ/1
10 TABLET, EXTENDED RELEASE ORAL 2 TIMES DAILY
COMMUNITY
Start: 2023-02-09

## 2023-03-30 RX ORDER — ROSUVASTATIN CALCIUM 5 MG/1
TABLET, COATED ORAL
Qty: 90 TABLET | Refills: 3 | Status: SHIPPED | OUTPATIENT
Start: 2023-03-30

## 2023-03-31 ENCOUNTER — HOSPITAL ENCOUNTER (OUTPATIENT)
Dept: CT IMAGING | Facility: HOSPITAL | Age: 73
Discharge: HOME | End: 2023-03-31
Payer: MEDICARE

## 2023-03-31 ENCOUNTER — HOSPITAL ENCOUNTER (OUTPATIENT)
Dept: CT IMAGING | Facility: HOSPITAL | Age: 73
Discharge: HOME | End: 2023-03-31
Admitting: NURSE PRACTITIONER
Payer: MEDICARE

## 2023-03-31 VITALS
SYSTOLIC BLOOD PRESSURE: 147 MMHG | DIASTOLIC BLOOD PRESSURE: 67 MMHG | RESPIRATION RATE: 15 BRPM | WEIGHT: 171.08 LBS | OXYGEN SATURATION: 100 % | HEART RATE: 67 BPM | TEMPERATURE: 97.5 F | HEIGHT: 64 IN | BODY MASS INDEX: 29.21 KG/M2

## 2023-03-31 DIAGNOSIS — R07.89 CHEST PAIN, ATYPICAL: ICD-10-CM

## 2023-03-31 DIAGNOSIS — R93.1 ABNORMAL FINDINGS DIAGNOSTIC IMAGING OF HEART AND CORONARY CIRCULATION: Primary | ICD-10-CM

## 2023-03-31 DIAGNOSIS — R93.1 ABNORMAL FINDINGS DIAGNOSTIC IMAGING OF HEART AND CORONARY CIRCULATION: ICD-10-CM

## 2023-03-31 LAB
CREAT SERPL-MCNC: 1.11 MG/DL (ref 0.57–1)
EGFRCR SERPLBLD CKD-EPI 2021: 52.3 ML/MIN/1.73

## 2023-03-31 PROCEDURE — 82565 ASSAY OF CREATININE: CPT | Performed by: INTERNAL MEDICINE

## 2023-03-31 PROCEDURE — 75574 CT ANGIO HRT W/3D IMAGE: CPT

## 2023-03-31 PROCEDURE — 0503T HC CORONARY FFR CTA DATA ALYS & GNRJ ESTIMATED FFR MODEL: CPT

## 2023-03-31 PROCEDURE — 75574 CT ANGIO HRT W/3D IMAGE: CPT | Performed by: INTERNAL MEDICINE

## 2023-03-31 PROCEDURE — 0504T CT CORONARY FFR CTA DATA ALYS & GNRJ ESTIMATED FFR MODEL: CPT | Performed by: INTERNAL MEDICINE

## 2023-03-31 PROCEDURE — 0502T HC CORONARY FFR DERIVED CTA DATA PREP & TRANSMIS: CPT

## 2023-03-31 PROCEDURE — 25510000001 IOPAMIDOL PER 1 ML: Performed by: NURSE PRACTITIONER

## 2023-03-31 RX ORDER — METOPROLOL TARTRATE 50 MG/1
50 TABLET, FILM COATED ORAL ONCE AS NEEDED
Status: DISCONTINUED | OUTPATIENT
Start: 2023-03-31 | End: 2023-04-01 | Stop reason: HOSPADM

## 2023-03-31 RX ORDER — SODIUM CHLORIDE 0.9 % (FLUSH) 0.9 %
3 SYRINGE (ML) INJECTION EVERY 12 HOURS SCHEDULED
Status: DISCONTINUED | OUTPATIENT
Start: 2023-03-31 | End: 2023-04-01 | Stop reason: HOSPADM

## 2023-03-31 RX ORDER — NITROGLYCERIN 0.4 MG/1
0.4 TABLET SUBLINGUAL
Status: COMPLETED | OUTPATIENT
Start: 2023-03-31 | End: 2023-03-31

## 2023-03-31 RX ORDER — METOPROLOL TARTRATE 5 MG/5ML
5 INJECTION INTRAVENOUS
Status: DISCONTINUED | OUTPATIENT
Start: 2023-03-31 | End: 2023-04-01 | Stop reason: HOSPADM

## 2023-03-31 RX ORDER — NITROGLYCERIN 0.4 MG/1
TABLET SUBLINGUAL
Status: COMPLETED | OUTPATIENT
Start: 2023-03-31 | End: 2023-03-31

## 2023-03-31 RX ORDER — SODIUM CHLORIDE 0.9 % (FLUSH) 0.9 %
10 SYRINGE (ML) INJECTION AS NEEDED
Status: DISCONTINUED | OUTPATIENT
Start: 2023-03-31 | End: 2023-04-01 | Stop reason: HOSPADM

## 2023-03-31 RX ORDER — METOPROLOL TARTRATE 100 MG/1
100 TABLET ORAL ONCE AS NEEDED
Status: DISCONTINUED | OUTPATIENT
Start: 2023-03-31 | End: 2023-04-01 | Stop reason: HOSPADM

## 2023-03-31 RX ORDER — LIDOCAINE HYDROCHLORIDE 10 MG/ML
5 INJECTION, SOLUTION EPIDURAL; INFILTRATION; INTRACAUDAL; PERINEURAL AS NEEDED
Status: DISCONTINUED | OUTPATIENT
Start: 2023-03-31 | End: 2023-04-01 | Stop reason: HOSPADM

## 2023-03-31 RX ADMIN — NITROGLYCERIN 0.4 MG: 0.4 TABLET SUBLINGUAL at 15:02

## 2023-03-31 RX ADMIN — IOPAMIDOL 100 ML: 755 INJECTION, SOLUTION INTRAVENOUS at 15:13

## 2023-03-31 RX ADMIN — NITROGLYCERIN 0.4 MG: 0.4 TABLET SUBLINGUAL at 15:06

## 2023-04-06 ENCOUNTER — TELEPHONE (OUTPATIENT)
Dept: CARDIOLOGY | Facility: CLINIC | Age: 73
End: 2023-04-06
Payer: MEDICARE

## 2023-04-06 NOTE — TELEPHONE ENCOUNTER
----- Message from Sukhdeep Arevalo MD sent at 4/6/2023  3:40 PM CDT -----  Regarding: Abnormal CT FFR  Abnormal CT FFR of proximal to mid left anterior descending coronary artery worrisome for significant underlying hemodynamic stenosis  If can be seen by ALTHEA Ramirez or any midlevel over the next few weeks

## 2023-04-10 NOTE — H&P (VIEW-ONLY)
Chief Complaint  Chest Pain (2wk F/U) and Results (CTA/FFR)    Subjective          Liudmila Ga presents to [facility] [id number] for routine follow-up of outpatient testing.  CT angiogram of the coronary arteries on 3/31/2023 revealed coronary calcium score of 164 with dense focal calcification in the proximal LAD with approximately 60% stenosis and 40 to 50% stenosis of the proximal left circumflex coronary artery.  CT FFR of the mid LAD was abnormal and worrisome for significant underlying hemodynamic stenosis.  She has coronary artery calcification, paroxysmal supraventricular tachycardia, hypertension, hyperlipidemia, hypothyroid, arthritis and GERD. She complains of decreased stamina since having Covid-19 for a second time several months ago. She continues to complain of exertional pain between her shoulder blades that resolves with rest. Patient denies shortness of breath, palpitations, dizziness, syncope, orthopnea, PND, or edema.  Patient denies any signs of bleeding.    Hypertension  This is a chronic problem. The current episode started more than 1 year ago. The problem is controlled. Associated symptoms include chest pain and malaise/fatigue. Pertinent negatives include no anxiety, blurred vision, headaches, neck pain, orthopnea, palpitations, peripheral edema, PND, shortness of breath or sweats. Risk factors for coronary artery disease include dyslipidemia. Current antihypertension treatment includes beta blockers and calcium channel blockers. The current treatment provides significant improvement. Identifiable causes of hypertension include a thyroid problem.   Hyperlipidemia  This is a chronic problem. The current episode started more than 1 year ago. Exacerbating diseases include hypothyroidism. Associated symptoms include chest pain. Pertinent negatives include no shortness of breath. Current antihyperlipidemic treatment includes statins. Risk factors for coronary  artery disease include post-menopausal, hypertension and dyslipidemia.     I have reviewed and confirmed the accuracy of the ROS  ALTHEA Merrill      Objective     Current Outpatient Medications:   •  atenolol (TENORMIN) 25 MG tablet, Take 1 tablet by mouth Daily., Disp: , Rfl:   •  Biotin 11676 MCG tablet, Take  by mouth., Disp: , Rfl:   •  bisacodyl (DULCOLAX) 5 MG EC tablet, Take 1 tablet by mouth Daily As Needed for Constipation., Disp: , Rfl:   •  Cholecalciferol (VITAMIN D3) 2000 units tablet, Take  by mouth., Disp: , Rfl:   •  diclofenac (VOLTAREN) 75 MG EC tablet, Take 1 tablet by mouth 2 (Two) Times a Day., Disp: , Rfl:   •  dilTIAZem CD (CARDIZEM CD) 120 MG 24 hr capsule, Take 1 capsule by mouth Daily., Disp: , Rfl:   •  DULoxetine (CYMBALTA) 30 MG capsule, Take 1 capsule by mouth 3 (Three) Times a Week., Disp: , Rfl:   •  fluticasone (FLONASE) 50 MCG/ACT nasal spray, 2 sprays into the nostril(s) as directed by provider Daily., Disp: , Rfl:   •  loratadine (CLARITIN) 10 MG tablet, Take 1 tablet by mouth Daily., Disp: , Rfl:   •  Lysine 500 MG capsule, Take 500 mg by mouth., Disp: , Rfl:   •  metoclopramide (REGLAN) 5 MG tablet, Take 1 tablet by mouth 2 (Two) Times a Day., Disp: , Rfl:   •  Omega-3 Fatty Acids (FISH OIL) 1200 MG capsule capsule, Take 1 capsule by mouth Daily., Disp: , Rfl:   •  pantoprazole (PROTONIX) 40 MG EC tablet, Take 1 tablet by mouth Daily., Disp: , Rfl:   •  potassium citrate (UROCIT-K) 10 MEQ (1080 MG) CR tablet, 1 tablet 2 (Two) Times a Day., Disp: , Rfl:   •  rosuvastatin (CRESTOR) 5 MG tablet, TAKE 1 TABLET EVERY DAY, Disp: 90 tablet, Rfl: 3  •  Synthroid 50 MCG tablet, , Disp: , Rfl:   •  vitamin C (ASCORBIC ACID) 500 MG tablet, Take 2 tablets by mouth Daily., Disp: , Rfl:   •  Zinc 50 MG capsule, Take  by mouth., Disp: , Rfl:   •  zolpidem (AMBIEN) 10 MG tablet, Take 1 tablet by mouth Every Night., Disp: , Rfl:   Vital Signs:   /62   Pulse 68   Ht 162.6 cm  "(64\")   Wt 78.5 kg (173 lb)   SpO2 97%   BMI 29.70 kg/m²     Vitals and nursing note reviewed.   Constitutional:       General: Not in acute distress.     Appearance: Normal and healthy appearance. Well-developed, overweight and not in distress. Not diaphoretic.   Eyes:      General: Lids are normal.         Right eye: No discharge.         Left eye: No discharge.      Conjunctiva/sclera: Conjunctivae normal.      Pupils: Pupils are equal, round, and reactive to light.   HENT:      Head: Normocephalic and atraumatic.      Jaw: There is normal jaw occlusion.      Right Ear: External ear normal.      Left Ear: External ear normal.      Nose: Nose normal.   Neck:      Thyroid: No thyromegaly.      Vascular: No carotid bruit, JVD or JVR. JVD normal.      Trachea: Trachea normal. No tracheal deviation.   Pulmonary:      Effort: Pulmonary effort is normal. No respiratory distress.      Breath sounds: Normal breath sounds. No decreased breath sounds. No wheezing. No rhonchi. No rales.   Chest:      Chest wall: Not tender to palpatation.   Cardiovascular:      PMI at left midclavicular line. Bradycardia present. Regular rhythm. Normal S1. Normal S2.      Murmurs: There is no murmur.      No gallop. No click. No rub.   Pulses:     Intact distal pulses. No decreased pulses.   Edema:     Peripheral edema absent.   Abdominal:      General: Bowel sounds are normal. There is no distension.      Palpations: Abdomen is soft.      Tenderness: There is no abdominal tenderness.   Musculoskeletal: Normal range of motion.         General: No tenderness or deformity.      Cervical back: Normal range of motion and neck supple. Skin:     General: Skin is warm and dry.      Coloration: Skin is not pale.      Findings: No erythema or rash.   Neurological:      General: No focal deficit present.      Mental Status: Alert, oriented to person, place, and time and oriented to person, place and time.   Psychiatric:         Attention and " Perception: Attention and perception normal.         Mood and Affect: Mood and affect normal.         Speech: Speech normal.         Behavior: Behavior normal.         Thought Content: Thought content normal.         Cognition and Memory: Cognition and memory normal.         Judgment: Judgment normal.        Result Review :   The following data was reviewed by: ALTHEA Merrill on 4/11/2023:  Common labs        3/31/2023    13:44   Common Labs   Creatinine 1.11       Data reviewed: Cardiology studies dobutamine stress echo 5/18/22, 14 day holter monitor 5/30/22, CT angiogram of the coronary arteries 3/31/2023 and 2d echo 6/24/22          Assessment and Plan    Diagnoses and all orders for this visit:    1. Coronary artery calcification (Primary)- coronary calcium score of 175.8 on CT cardiac calcium score 4/25/2022.  Negative dobutamine stress echo 5/18/2022.  Abnormal CT angiogram of the coronary arteries 3/31/2023 with abnormal CT FFR of the mid LAD.  Patient to be scheduled for left heart catheterization. Start aspirin 81 mg daily.     2. Primary hypertension-blood pressure is well controlled.  Continue atenolol and diltiazem.  Monitor and record daily blood pressure. Report readings consistently higher than 130/90 or consistently lower than 100/60.     3. Mixed hyperlipidemia-management per PCP.  Continue rosuvastatin.    4. PSVT (paroxysmal supraventricular tachycardia) (HCC)- single 5 beat run on recent 14 day holter monitor. Stable. Continue atenolol.     5. Chest pain, atypical-patient to be scheduled for left heart catheterization.    Follow Up   Return in about 4 weeks (around 5/9/2023) for Next scheduled follow up.  Patient was given instructions and counseling regarding her condition or for health maintenance advice. Please see specific information pulled into the AVS if appropriate.

## 2023-04-11 ENCOUNTER — OFFICE VISIT (OUTPATIENT)
Dept: CARDIOLOGY | Facility: CLINIC | Age: 73
End: 2023-04-11
Payer: MEDICARE

## 2023-04-11 ENCOUNTER — PREP FOR SURGERY (OUTPATIENT)
Dept: OTHER | Facility: HOSPITAL | Age: 73
End: 2023-04-11
Payer: MEDICARE

## 2023-04-11 VITALS
BODY MASS INDEX: 29.53 KG/M2 | WEIGHT: 173 LBS | DIASTOLIC BLOOD PRESSURE: 62 MMHG | HEIGHT: 64 IN | SYSTOLIC BLOOD PRESSURE: 122 MMHG | OXYGEN SATURATION: 97 % | HEART RATE: 68 BPM

## 2023-04-11 DIAGNOSIS — R07.89 CHEST PAIN, ATYPICAL: ICD-10-CM

## 2023-04-11 DIAGNOSIS — I10 PRIMARY HYPERTENSION: ICD-10-CM

## 2023-04-11 DIAGNOSIS — I25.10 CORONARY ARTERY CALCIFICATION: Primary | ICD-10-CM

## 2023-04-11 DIAGNOSIS — E78.2 MIXED HYPERLIPIDEMIA: ICD-10-CM

## 2023-04-11 DIAGNOSIS — R93.1 ABNORMAL CARDIAC CT ANGIOGRAPHY: Primary | ICD-10-CM

## 2023-04-11 DIAGNOSIS — I25.84 CORONARY ARTERY CALCIFICATION: Primary | ICD-10-CM

## 2023-04-11 DIAGNOSIS — I47.1 PSVT (PAROXYSMAL SUPRAVENTRICULAR TACHYCARDIA): ICD-10-CM

## 2023-04-11 PROCEDURE — 3074F SYST BP LT 130 MM HG: CPT | Performed by: NURSE PRACTITIONER

## 2023-04-11 PROCEDURE — 1160F RVW MEDS BY RX/DR IN RCRD: CPT | Performed by: NURSE PRACTITIONER

## 2023-04-11 PROCEDURE — 3078F DIAST BP <80 MM HG: CPT | Performed by: NURSE PRACTITIONER

## 2023-04-11 PROCEDURE — 1159F MED LIST DOCD IN RCRD: CPT | Performed by: NURSE PRACTITIONER

## 2023-04-11 PROCEDURE — 99214 OFFICE O/P EST MOD 30 MIN: CPT | Performed by: NURSE PRACTITIONER

## 2023-04-11 RX ORDER — SODIUM CHLORIDE 9 MG/ML
40 INJECTION, SOLUTION INTRAVENOUS AS NEEDED
Status: CANCELLED | OUTPATIENT
Start: 2023-04-11

## 2023-04-11 RX ORDER — LORATADINE 10 MG/1
10 TABLET ORAL DAILY
COMMUNITY

## 2023-04-11 RX ORDER — ONDANSETRON 2 MG/ML
4 INJECTION INTRAMUSCULAR; INTRAVENOUS EVERY 6 HOURS PRN
Status: CANCELLED | OUTPATIENT
Start: 2023-04-11

## 2023-04-11 RX ORDER — SODIUM CHLORIDE 9 MG/ML
1-3 INJECTION, SOLUTION INTRAVENOUS CONTINUOUS
Status: CANCELLED | OUTPATIENT
Start: 2023-04-11

## 2023-04-11 RX ORDER — NITROGLYCERIN 0.4 MG/1
0.4 TABLET SUBLINGUAL
Status: CANCELLED | OUTPATIENT
Start: 2023-04-11

## 2023-04-11 RX ORDER — SODIUM CHLORIDE 0.9 % (FLUSH) 0.9 %
10 SYRINGE (ML) INJECTION AS NEEDED
Status: CANCELLED | OUTPATIENT
Start: 2023-04-11

## 2023-04-11 RX ORDER — SODIUM CHLORIDE 0.9 % (FLUSH) 0.9 %
10 SYRINGE (ML) INJECTION EVERY 12 HOURS SCHEDULED
Status: CANCELLED | OUTPATIENT
Start: 2023-04-11

## 2023-04-13 PROBLEM — R93.1 ABNORMAL CARDIAC CT ANGIOGRAPHY: Status: ACTIVE | Noted: 2023-04-13

## 2023-04-21 ENCOUNTER — HOSPITAL ENCOUNTER (OUTPATIENT)
Facility: HOSPITAL | Age: 73
Discharge: HOME | End: 2023-04-23
Attending: INTERNAL MEDICINE | Admitting: EMERGENCY MEDICINE
Payer: MEDICARE

## 2023-04-21 DIAGNOSIS — R07.89 CHEST PAIN, ATYPICAL: Primary | ICD-10-CM

## 2023-04-21 DIAGNOSIS — R93.1 ABNORMAL CARDIAC CT ANGIOGRAPHY: ICD-10-CM

## 2023-04-21 LAB
ALBUMIN SERPL-MCNC: 4.4 G/DL (ref 3.5–5.2)
ALBUMIN/GLOB SERPL: 1.6 G/DL
ALP SERPL-CCNC: 99 U/L (ref 39–117)
ALT SERPL W P-5'-P-CCNC: 24 U/L (ref 1–33)
ANION GAP SERPL CALCULATED.3IONS-SCNC: 11 MMOL/L (ref 5–15)
AST SERPL-CCNC: 19 U/L (ref 1–32)
BACTERIA UR QL AUTO: ABNORMAL /HPF
BILIRUB SERPL-MCNC: 0.3 MG/DL (ref 0–1.2)
BILIRUB UR QL STRIP: NEGATIVE
BUN SERPL-MCNC: 44 MG/DL (ref 8–23)
BUN/CREAT SERPL: 26.3 (ref 7–25)
CALCIUM SPEC-SCNC: 9.3 MG/DL (ref 8.6–10.5)
CHLORIDE SERPL-SCNC: 106 MMOL/L (ref 98–107)
CLARITY UR: CLEAR
CO2 SERPL-SCNC: 22 MMOL/L (ref 22–29)
COLOR UR: YELLOW
CREAT SERPL-MCNC: 1.67 MG/DL (ref 0.57–1)
DEPRECATED RDW RBC AUTO: 51.4 FL (ref 37–54)
EGFRCR SERPLBLD CKD-EPI 2021: 32 ML/MIN/1.73
ERYTHROCYTE [DISTWIDTH] IN BLOOD BY AUTOMATED COUNT: 13.8 % (ref 12.3–15.4)
GLOBULIN UR ELPH-MCNC: 2.8 GM/DL
GLUCOSE SERPL-MCNC: 122 MG/DL (ref 65–99)
GLUCOSE UR STRIP-MCNC: NEGATIVE MG/DL
HCT VFR BLD AUTO: 35.3 % (ref 34–46.6)
HGB BLD-MCNC: 10.8 G/DL (ref 12–15.9)
HGB UR QL STRIP.AUTO: NEGATIVE
HYALINE CASTS UR QL AUTO: ABNORMAL /LPF
KETONES UR QL STRIP: NEGATIVE
LEUKOCYTE ESTERASE UR QL STRIP.AUTO: ABNORMAL
MCH RBC QN AUTO: 30.9 PG (ref 26.6–33)
MCHC RBC AUTO-ENTMCNC: 30.6 G/DL (ref 31.5–35.7)
MCV RBC AUTO: 100.9 FL (ref 79–97)
NITRITE UR QL STRIP: NEGATIVE
PH UR STRIP.AUTO: <=5 [PH] (ref 5–8)
PLATELET # BLD AUTO: 259 10*3/MM3 (ref 140–450)
PMV BLD AUTO: 11.2 FL (ref 6–12)
POTASSIUM SERPL-SCNC: 5.1 MMOL/L (ref 3.5–5.2)
PROT SERPL-MCNC: 7.2 G/DL (ref 6–8.5)
PROT UR QL STRIP: NEGATIVE
PTH-INTACT SERPL-MCNC: 35.9 PG/ML (ref 15–65)
RBC # BLD AUTO: 3.5 10*6/MM3 (ref 3.77–5.28)
RBC # UR STRIP: ABNORMAL /HPF
REF LAB TEST METHOD: ABNORMAL
SODIUM SERPL-SCNC: 139 MMOL/L (ref 136–145)
SODIUM UR-SCNC: 93 MMOL/L
SP GR UR STRIP: 1.02 (ref 1–1.03)
SQUAMOUS #/AREA URNS HPF: ABNORMAL /HPF
UROBILINOGEN UR QL STRIP: ABNORMAL
WBC # UR STRIP: ABNORMAL /HPF
WBC NRBC COR # BLD: 7.75 10*3/MM3 (ref 3.4–10.8)

## 2023-04-21 PROCEDURE — 96360 HYDRATION IV INFUSION INIT: CPT

## 2023-04-21 PROCEDURE — 84300 ASSAY OF URINE SODIUM: CPT | Performed by: INTERNAL MEDICINE

## 2023-04-21 PROCEDURE — 82570 ASSAY OF URINE CREATININE: CPT | Performed by: INTERNAL MEDICINE

## 2023-04-21 PROCEDURE — 83970 ASSAY OF PARATHORMONE: CPT | Performed by: INTERNAL MEDICINE

## 2023-04-21 PROCEDURE — 85027 COMPLETE CBC AUTOMATED: CPT | Performed by: NURSE PRACTITIONER

## 2023-04-21 PROCEDURE — 96361 HYDRATE IV INFUSION ADD-ON: CPT

## 2023-04-21 PROCEDURE — 81001 URINALYSIS AUTO W/SCOPE: CPT | Performed by: INTERNAL MEDICINE

## 2023-04-21 PROCEDURE — 80053 COMPREHEN METABOLIC PANEL: CPT | Performed by: NURSE PRACTITIONER

## 2023-04-21 RX ORDER — DILTIAZEM HYDROCHLORIDE 120 MG/1
120 CAPSULE, COATED, EXTENDED RELEASE ORAL DAILY
Status: DISCONTINUED | OUTPATIENT
Start: 2023-04-22 | End: 2023-04-23 | Stop reason: HOSPADM

## 2023-04-21 RX ORDER — DULOXETIN HYDROCHLORIDE 30 MG/1
30 CAPSULE, DELAYED RELEASE ORAL 3 TIMES WEEKLY
Status: DISCONTINUED | OUTPATIENT
Start: 2023-04-21 | End: 2023-04-23 | Stop reason: HOSPADM

## 2023-04-21 RX ORDER — SODIUM CHLORIDE 9 MG/ML
40 INJECTION, SOLUTION INTRAVENOUS AS NEEDED
Status: DISCONTINUED | OUTPATIENT
Start: 2023-04-21 | End: 2023-04-21 | Stop reason: HOSPADM

## 2023-04-21 RX ORDER — ATENOLOL 25 MG/1
25 TABLET ORAL NIGHTLY
Status: DISCONTINUED | OUTPATIENT
Start: 2023-04-21 | End: 2023-04-23 | Stop reason: HOSPADM

## 2023-04-21 RX ORDER — METOCLOPRAMIDE 5 MG/1
5 TABLET ORAL 2 TIMES DAILY
Status: DISCONTINUED | OUTPATIENT
Start: 2023-04-21 | End: 2023-04-21

## 2023-04-21 RX ORDER — SODIUM CHLORIDE 9 MG/ML
75 INJECTION, SOLUTION INTRAVENOUS CONTINUOUS
Status: ACTIVE | OUTPATIENT
Start: 2023-04-21 | End: 2023-04-21

## 2023-04-21 RX ORDER — BISACODYL 5 MG/1
5 TABLET, DELAYED RELEASE ORAL DAILY PRN
Status: DISCONTINUED | OUTPATIENT
Start: 2023-04-21 | End: 2023-04-23 | Stop reason: HOSPADM

## 2023-04-21 RX ORDER — SODIUM CHLORIDE 9 MG/ML
1-3 INJECTION, SOLUTION INTRAVENOUS CONTINUOUS
Status: DISCONTINUED | OUTPATIENT
Start: 2023-04-21 | End: 2023-04-23 | Stop reason: HOSPADM

## 2023-04-21 RX ORDER — ROSUVASTATIN CALCIUM 10 MG/1
5 TABLET, COATED ORAL NIGHTLY
Status: DISCONTINUED | OUTPATIENT
Start: 2023-04-21 | End: 2023-04-23 | Stop reason: HOSPADM

## 2023-04-21 RX ORDER — SODIUM CHLORIDE 0.9 % (FLUSH) 0.9 %
10 SYRINGE (ML) INJECTION AS NEEDED
Status: DISCONTINUED | OUTPATIENT
Start: 2023-04-21 | End: 2023-04-21 | Stop reason: HOSPADM

## 2023-04-21 RX ORDER — FLUTICASONE PROPIONATE 50 MCG
2 SPRAY, SUSPENSION (ML) NASAL DAILY
Status: DISCONTINUED | OUTPATIENT
Start: 2023-04-22 | End: 2023-04-23 | Stop reason: HOSPADM

## 2023-04-21 RX ORDER — NITROGLYCERIN 0.4 MG/1
0.4 TABLET SUBLINGUAL
Status: DISCONTINUED | OUTPATIENT
Start: 2023-04-21 | End: 2023-04-21 | Stop reason: HOSPADM

## 2023-04-21 RX ORDER — SODIUM CHLORIDE 0.9 % (FLUSH) 0.9 %
10 SYRINGE (ML) INJECTION EVERY 12 HOURS SCHEDULED
Status: DISCONTINUED | OUTPATIENT
Start: 2023-04-21 | End: 2023-04-23 | Stop reason: HOSPADM

## 2023-04-21 RX ORDER — SODIUM CHLORIDE 0.9 % (FLUSH) 0.9 %
10 SYRINGE (ML) INJECTION EVERY 12 HOURS SCHEDULED
Status: DISCONTINUED | OUTPATIENT
Start: 2023-04-21 | End: 2023-04-21 | Stop reason: HOSPADM

## 2023-04-21 RX ORDER — METOCLOPRAMIDE 5 MG/1
5 TABLET ORAL 2 TIMES DAILY WITH MEALS
Status: DISCONTINUED | OUTPATIENT
Start: 2023-04-21 | End: 2023-04-23 | Stop reason: HOSPADM

## 2023-04-21 RX ORDER — ATENOLOL 25 MG/1
25 TABLET ORAL DAILY
Status: DISCONTINUED | OUTPATIENT
Start: 2023-04-21 | End: 2023-04-21

## 2023-04-21 RX ORDER — PANTOPRAZOLE SODIUM 40 MG/1
40 TABLET, DELAYED RELEASE ORAL DAILY
Status: DISCONTINUED | OUTPATIENT
Start: 2023-04-22 | End: 2023-04-23 | Stop reason: HOSPADM

## 2023-04-21 RX ORDER — ASCORBIC ACID 500 MG
1000 TABLET ORAL DAILY
Status: DISCONTINUED | OUTPATIENT
Start: 2023-04-21 | End: 2023-04-22

## 2023-04-21 RX ORDER — LEVOTHYROXINE SODIUM 0.05 MG/1
50 TABLET ORAL
Status: DISCONTINUED | OUTPATIENT
Start: 2023-04-22 | End: 2023-04-23 | Stop reason: HOSPADM

## 2023-04-21 RX ORDER — SODIUM CHLORIDE 9 MG/ML
75 INJECTION, SOLUTION INTRAVENOUS AS NEEDED
Status: DISCONTINUED | OUTPATIENT
Start: 2023-04-21 | End: 2023-04-21

## 2023-04-21 RX ORDER — ZOLPIDEM TARTRATE 5 MG/1
5 TABLET ORAL NIGHTLY
Status: DISCONTINUED | OUTPATIENT
Start: 2023-04-21 | End: 2023-04-23 | Stop reason: HOSPADM

## 2023-04-21 RX ORDER — ONDANSETRON 2 MG/ML
4 INJECTION INTRAMUSCULAR; INTRAVENOUS EVERY 6 HOURS PRN
Status: DISCONTINUED | OUTPATIENT
Start: 2023-04-21 | End: 2023-04-21 | Stop reason: HOSPADM

## 2023-04-21 RX ORDER — SODIUM CHLORIDE 0.9 % (FLUSH) 0.9 %
10 SYRINGE (ML) INJECTION AS NEEDED
Status: DISCONTINUED | OUTPATIENT
Start: 2023-04-21 | End: 2023-04-23 | Stop reason: HOSPADM

## 2023-04-21 RX ADMIN — ZOLPIDEM TARTRATE 5 MG: 5 TABLET ORAL at 20:24

## 2023-04-21 RX ADMIN — ATENOLOL 25 MG: 25 TABLET ORAL at 20:24

## 2023-04-21 RX ADMIN — Medication 10 ML: at 20:25

## 2023-04-21 RX ADMIN — SODIUM CHLORIDE 75 ML: 9 INJECTION, SOLUTION INTRAVENOUS at 16:19

## 2023-04-21 RX ADMIN — SODIUM CHLORIDE 1 ML/KG/HR: 9 INJECTION, SOLUTION INTRAVENOUS at 11:47

## 2023-04-21 RX ADMIN — ROSUVASTATIN CALCIUM 5 MG: 10 TABLET, FILM COATED ORAL at 20:24

## 2023-04-21 NOTE — CONSULTS
Nephrology ([facility]) Consult Note      Patient:  Liudmila Ga  YOB: 1950  Date of Service: 4/21/2023  MRN: [id number]   Acct: [id number]   Primary Care Physician: Nigel Ramos MD  Advance Directive:   Code Status and Medical Interventions:   Ordered at: 04/21/23 1358     Code Status (Patient has no pulse and is not breathing):    CPR (Attempt to Resuscitate)     Medical Interventions (Patient has pulse or is breathing):    Full Support     Release to patient:    Routine Release     Admit Date: 4/21/2023       Hospital Day: 0  Referring Provider: Sukhdeep Arevalo MD      Patient Seen, Chart, Consults, Notes, Labs, Radiology studies reviewed.      Subjective:  Liudmila Ga is a 72 y.o. female  whom we were consulted for acute kidney injury.  Patient has hypertension, borderline diabetes and abnormal coronary calcification on CT scan.  She is currently admitted to undergo heart catheterization.  Her serum creatinine was 1.1 on March 31. On 4/21, her serum creatinine was 1.6.  Patient admitted using diclofenac at home.  She also has a history of kidney stones.  In October 2022 she underwent left kidney surgery in [place] for staghorn calculus.  She currently denies chest pain and shortness of rest.  Her main concern is fatigue since her last episode of COVID-19 in 2022.  She has no dysuria.  She tries to stay well-hydrated.  She continues to use the potassium citrate to prevent kidney stones.  Patient is now on normal saline for preparation for heart cath.    Allergies:  Lortab [hydrocodone-acetaminophen], Percocet [oxycodone-acetaminophen], and Sulfa antibiotics    Home Meds:  Medications Prior to Admission   Medication Sig Dispense Refill Last Dose   • atenolol (TENORMIN) 25 MG tablet Take 1 tablet by mouth Daily.   4/20/2023   • Biotin 56200 MCG tablet Take  by mouth.   4/20/2023   • bisacodyl (DULCOLAX) 5 MG EC tablet Take 1 tablet by mouth Daily As  Needed for Constipation.   4/20/2023   • Cholecalciferol (VITAMIN D3) 2000 units tablet Take  by mouth.   4/20/2023   • dilTIAZem CD (CARDIZEM CD) 120 MG 24 hr capsule Take 1 capsule by mouth Daily.   4/21/2023   • DULoxetine (CYMBALTA) 30 MG capsule Take 1 capsule by mouth 3 (Three) Times a Week.   4/20/2023   • fluticasone (FLONASE) 50 MCG/ACT nasal spray 2 sprays into the nostril(s) as directed by provider Daily.   4/21/2023   • loratadine (CLARITIN) 10 MG tablet Take 1 tablet by mouth Daily.   4/21/2023   • Lysine 500 MG capsule Take 500 mg by mouth.   4/20/2023   • metoclopramide (REGLAN) 5 MG tablet Take 1 tablet by mouth 2 (Two) Times a Day.   4/21/2023   • Omega-3 Fatty Acids (FISH OIL) 1200 MG capsule capsule Take 1 capsule by mouth Daily.   4/20/2023   • pantoprazole (PROTONIX) 40 MG EC tablet Take 1 tablet by mouth Daily.   4/21/2023   • potassium citrate (UROCIT-K) 10 MEQ (1080 MG) CR tablet 1 tablet 2 (Two) Times a Day.   4/20/2023   • rosuvastatin (CRESTOR) 5 MG tablet TAKE 1 TABLET EVERY DAY 90 tablet 3 4/20/2023   • Synthroid 50 MCG tablet    4/21/2023   • vitamin C (ASCORBIC ACID) 500 MG tablet Take 2 tablets by mouth Daily.   4/20/2023   • Zinc 50 MG capsule Take  by mouth.   4/20/2023   • zolpidem (AMBIEN) 10 MG tablet Take 1 tablet by mouth Every Night.   4/20/2023       Medicines:  Current Facility-Administered Medications   Medication Dose Route Frequency Provider Last Rate Last Admin   • ascorbic acid (VITAMIN C) tablet 1,000 mg  1,000 mg Oral Daily Sukhdeep Arevalo MD       • atenolol (TENORMIN) tablet 25 mg  25 mg Oral Nightly Sukhdeep Arevalo MD       • bisacodyl (DULCOLAX) EC tablet 5 mg  5 mg Oral Daily PRN Sukhdeep Arevalo MD       • [START ON 4/22/2023] dilTIAZem CD (CARDIZEM CD) 24 hr capsule 120 mg  120 mg Oral Daily Sukhdeep Arevalo MD       • DULoxetine (CYMBALTA) DR capsule 30 mg  30 mg Oral Once per day on Mon Wed Fri Sukhdeep Arevalo MD       • [START ON 4/22/2023] fluticasone (FLONASE) 50  MCG/ACT nasal spray 2 spray  2 spray Nasal Daily Sukhdeep Arevalo MD       • [START ON 4/22/2023] levothyroxine (SYNTHROID, LEVOTHROID) tablet 50 mcg  50 mcg Oral Q AM Sukhdeep Arevalo MD       • metoclopramide (REGLAN) tablet 5 mg  5 mg Oral BID With Meals Sukhdeep Arevalo MD       • [START ON 4/22/2023] pantoprazole (PROTONIX) EC tablet 40 mg  40 mg Oral Daily Sukhdeep Arevalo MD       • rosuvastatin (CRESTOR) tablet 5 mg  5 mg Oral Nightly Sukhdeep Arevalo MD       • sodium chloride 0.9 % flush 10 mL  10 mL Intravenous Q12H Sukhdeep Arevalo MD       • sodium chloride 0.9 % flush 10 mL  10 mL Intravenous PRN Sukhdeep Arevalo MD       • sodium chloride 0.9 % infusion 75 mL  75 mL Intravenous Continuous Sukhdeep Arevalo  mL/hr at 04/21/23 1619 75 mL at 04/21/23 1619   • sodium chloride 0.9 % infusion  1-3 mL/kg/hr Intravenous Continuous Leatha Montez APRN 78.5 mL/hr at 04/21/23 1147 1 mL/kg/hr at 04/21/23 1147   • zolpidem (AMBIEN) tablet 5 mg  5 mg Oral Nightly Sukhdeep Arevalo MD           Past Medical History:  Past Medical History:   Diagnosis Date   • Arthritis    • Colon polyps    • Disease of thyroid gland    • GERD (gastroesophageal reflux disease)    • Hiatal hernia    • Kidney stone    • Osteopenia    • Overactive bladder    • Vitamin D deficiency        Past Surgical History:  Past Surgical History:   Procedure Laterality Date   • CHOLECYSTECTOMY  2004   • COLONOSCOPY  08/20/2014    HYPERPLASTIC POLYP ILEOCECAL VALVE AND HEPATIC FLEXURE, DIVERTICULA IN SIGMOID COLON   • COLONOSCOPY  02/04/2014    TUBULAR ADENOMA CECAL POLYP, TUBULAR ADENOMA RECTAL POLYP, HYPERPLASTICS POLYPS AT ILEOCECAL VALVE AND RECTAL.   • COLONOSCOPY N/A 12/11/2017    2 Sessile serrated adenomas large intestine and hepatic flexure, 2 tubular adenomas cecum and at 60 cm  repeat exam in 3 years   • COLONOSCOPY N/A 7/23/2020    Procedure: COLONOSCOPY WITH ANESTHESIA;  Surgeon: Stevan Mullins MD;  Location: [facility] ENDOSCOPY;  Service:  "Gastroenterology;  Laterality: N/A;  pre: hx adenomatous colon polyp  post: diverticulolsis  Meals, Nigel Chacon MD   • ENDOSCOPY  2015    HIATAL HERNIA, MILD SCHATZKI RING DILATED   • ENDOSCOPY N/A 2017    HH, dilated otherwise normal exam   • ENDOSCOPY N/A 2019    Small HH, NOn-obstructing Schatzki ring dilated, food residue in stomach   • ENDOSCOPY N/A 10/3/2019    Gastritis otherwise normal exam   • HYSTERECTOMY  2011    TOTAL   • TONSILLECTOMY         Family History  Family History   Problem Relation Age of Onset   • Liver cancer Mother    • Colon cancer Brother    • Colon polyps Brother    • Breast cancer Neg Hx    • Ovarian cancer Neg Hx        Social History  Social History     Socioeconomic History   • Marital status:    Tobacco Use   • Smoking status: Former     Types: Cigarettes     Quit date: 2012     Years since quittin.2   • Smokeless tobacco: Never   Vaping Use   • Vaping Use: Never used   Substance and Sexual Activity   • Alcohol use: Not Currently     Comment: nightly   • Drug use: No   • Sexual activity: Never         Review of Systems:  History obtained from chart review and the patient  General ROS: No fever or chills  Respiratory ROS: No cough, shortness of breath, wheezing  Cardiovascular ROS: no chest pain or dyspnea on exertion  Gastrointestinal ROS: No abdominal pain or melena  Genito-Urinary ROS: No dysuria or hematuria  14 point ROS reviewed with the patient and negative except as noted above and in the HPI unless unable to obtain.    Objective:  /66 (BP Location: Right arm, Patient Position: Sitting)   Pulse 67   Temp 97.3 °F (36.3 °C) (Oral)   Resp 20   Ht 162.6 cm (64.02\")   Wt 77.1 kg (170 lb)   SpO2 100%   BMI 29.17 kg/m²   No intake or output data in the 24 hours ending 23 1654  General: awake/alert    Head: Atraumatic, normocephalic  Neck: No masses, no JVD  Chest:  clear to auscultation bilaterally without respiratory " distress  CVS: regular rate and rhythm  Abdominal: soft, nontender, normal bowel sounds  Extremities: No cyanosis, trace leg edema  Skin: warm and dry without rash  Neuro: No focal motor deficits  Musculoskeletal: No obvious joint effusions    Labs:  Lab Results (last 72 hours)     Procedure Component Value Units Date/Time    Comprehensive Metabolic Panel [[id number]]  (Abnormal) Collected: 04/21/23 1128    Specimen: Blood Updated: 04/21/23 1157     Glucose 122 mg/dL      BUN 44 mg/dL      Creatinine 1.67 mg/dL      Sodium 139 mmol/L      Potassium 5.1 mmol/L      Chloride 106 mmol/L      CO2 22.0 mmol/L      Calcium 9.3 mg/dL      Total Protein 7.2 g/dL      Albumin 4.4 g/dL      ALT (SGPT) 24 U/L      AST (SGOT) 19 U/L      Alkaline Phosphatase 99 U/L      Total Bilirubin 0.3 mg/dL      Globulin 2.8 gm/dL      A/G Ratio 1.6 g/dL      BUN/Creatinine Ratio 26.3     Anion Gap 11.0 mmol/L      eGFR 32.0 mL/min/1.73     Narrative:      GFR Normal >60  Chronic Kidney Disease <60  Kidney Failure <15    The GFR formula is only valid for adults with stable renal function between ages 18 and 70.    CBC (No Diff) [[id number]]  (Abnormal) Collected: 04/21/23 1128    Specimen: Blood Updated: 04/21/23 1140     WBC 7.75 10*3/mm3      RBC 3.50 10*6/mm3      Hemoglobin 10.8 g/dL      Hematocrit 35.3 %      .9 fL      MCH 30.9 pg      MCHC 30.6 g/dL      RDW 13.8 %      RDW-SD 51.4 fl      MPV 11.2 fL      Platelets 259 10*3/mm3           Radiology:   Imaging Results (Last 72 Hours)     ** No results found for the last 72 hours. **          Culture:  No components found for: WOUNDCUL, 3  No components found for: CSFCUL, 3  No components found for: BC, 3  No components found for: URINECUL, 3      Assessment   -Acute kidney injury-prerenal azotemia  -Cannot rule out underlying chronic disease stage IIIa  -Chronic use of NSAIDs  -Coronary calcification  -Hypertension  -Kidney stone    Plan:  At this time, patient appears to be at  risk for heart disease.  Benefits of heart catheterization outweigh the risk of exposure to intravenous dye and contrast-induced nephropathy.  Agree with normal saline to decrease risk of contrast-induced renal injury.  We will also obtain urine studies and kidney ultrasound to further work-up her possible underlying kidney disease.  Patient was encouraged to stop all NSAIDs.  We will check CK level, uric acid level and PTH.      Thank you for the consult, we appreciate the opportunity to provide care to your patients.  Feel free to contact me if I can be of any further assistance.      Jorge A Santa MD  4/21/2023  16:54 CDT

## 2023-04-21 NOTE — Clinical Note
The DP pulses are +1 bilaterally. The PT pulses are +2 bilaterally. The radial pulses are +2 bilaterally.

## 2023-04-21 NOTE — INTERVAL H&P NOTE
H&P reviewed. The patient was examined and there are no changes to the H&P.    Discussed in detail with patient daughter as well as   Patient has worsening renal failure  Her creatinine has increased from 1.1-1.67  Her GFR is 32  In view of this we will cancel cardiac cath  Hydrate patient  Nephrology consult  Coronary angiography in future

## 2023-04-21 NOTE — NURSING NOTE
Dr robins at bedside after evaluating patients lab work today and with consultation. Procedure cancelled due to increasing creatinine level. Patient to be admitted for IV fluids to hydrate and help with worsening renal issues

## 2023-04-22 ENCOUNTER — APPOINTMENT (OUTPATIENT)
Dept: ULTRASOUND IMAGING | Facility: HOSPITAL | Age: 73
End: 2023-04-22
Payer: MEDICARE

## 2023-04-22 PROBLEM — R11.0 NAUSEA: Status: RESOLVED | Noted: 2019-09-16 | Resolved: 2023-04-22

## 2023-04-22 PROBLEM — R13.10 DIFFICULTY SWALLOWING SOLIDS: Status: RESOLVED | Noted: 2017-09-12 | Resolved: 2023-04-22

## 2023-04-22 LAB
ANION GAP SERPL CALCULATED.3IONS-SCNC: 8 MMOL/L (ref 5–15)
BUN SERPL-MCNC: 31 MG/DL (ref 8–23)
BUN/CREAT SERPL: 29 (ref 7–25)
CALCIUM SPEC-SCNC: 9.1 MG/DL (ref 8.6–10.5)
CHLORIDE SERPL-SCNC: 109 MMOL/L (ref 98–107)
CO2 SERPL-SCNC: 22 MMOL/L (ref 22–29)
CREAT SERPL-MCNC: 1.07 MG/DL (ref 0.57–1)
CREAT UR-MCNC: 81.3 MG/DL
EGFRCR SERPLBLD CKD-EPI 2021: 54.6 ML/MIN/1.73
GLUCOSE SERPL-MCNC: 97 MG/DL (ref 65–99)
IRON 24H UR-MRATE: 74 MCG/DL (ref 37–145)
IRON SATN MFR SERPL: 19 % (ref 20–50)
POTASSIUM SERPL-SCNC: 5.2 MMOL/L (ref 3.5–5.2)
SODIUM SERPL-SCNC: 139 MMOL/L (ref 136–145)
TIBC SERPL-MCNC: 396 MCG/DL (ref 298–536)
TRANSFERRIN SERPL-MCNC: 266 MG/DL (ref 200–360)
URATE SERPL-MCNC: 7.5 MG/DL (ref 2.4–5.7)

## 2023-04-22 PROCEDURE — 80048 BASIC METABOLIC PNL TOTAL CA: CPT | Performed by: INTERNAL MEDICINE

## 2023-04-22 PROCEDURE — 83540 ASSAY OF IRON: CPT | Performed by: INTERNAL MEDICINE

## 2023-04-22 PROCEDURE — 84550 ASSAY OF BLOOD/URIC ACID: CPT | Performed by: INTERNAL MEDICINE

## 2023-04-22 PROCEDURE — 76775 US EXAM ABDO BACK WALL LIM: CPT

## 2023-04-22 PROCEDURE — 99233 SBSQ HOSP IP/OBS HIGH 50: CPT | Performed by: NURSE PRACTITIONER

## 2023-04-22 PROCEDURE — 84466 ASSAY OF TRANSFERRIN: CPT | Performed by: INTERNAL MEDICINE

## 2023-04-22 RX ORDER — FERROUS SULFATE 325(65) MG
325 TABLET ORAL
Status: DISCONTINUED | OUTPATIENT
Start: 2023-04-23 | End: 2023-04-23 | Stop reason: HOSPADM

## 2023-04-22 RX ORDER — ERYTHROMYCIN 5 MG/G
1 OINTMENT OPHTHALMIC NIGHTLY
COMMUNITY

## 2023-04-22 RX ORDER — ASCORBIC ACID 500 MG
1000 TABLET ORAL NIGHTLY
Status: DISCONTINUED | OUTPATIENT
Start: 2023-04-22 | End: 2023-04-23 | Stop reason: HOSPADM

## 2023-04-22 RX ORDER — DICLOFENAC SODIUM 75 MG/1
75 TABLET, DELAYED RELEASE ORAL 2 TIMES DAILY
COMMUNITY
End: 2023-04-23 | Stop reason: HOSPADM

## 2023-04-22 RX ADMIN — PANTOPRAZOLE SODIUM 40 MG: 40 TABLET, DELAYED RELEASE ORAL at 08:24

## 2023-04-22 RX ADMIN — DILTIAZEM HYDROCHLORIDE 120 MG: 120 CAPSULE, COATED, EXTENDED RELEASE ORAL at 08:24

## 2023-04-22 RX ADMIN — ROSUVASTATIN CALCIUM 5 MG: 10 TABLET, FILM COATED ORAL at 20:32

## 2023-04-22 RX ADMIN — METOCLOPRAMIDE 5 MG: 5 TABLET ORAL at 17:16

## 2023-04-22 RX ADMIN — LEVOTHYROXINE SODIUM 50 MCG: 50 TABLET ORAL at 05:07

## 2023-04-22 RX ADMIN — Medication 10 ML: at 20:34

## 2023-04-22 RX ADMIN — OXYCODONE HYDROCHLORIDE AND ACETAMINOPHEN 1000 MG: 500 TABLET ORAL at 20:32

## 2023-04-22 RX ADMIN — ZOLPIDEM TARTRATE 5 MG: 5 TABLET ORAL at 20:32

## 2023-04-22 RX ADMIN — SODIUM CHLORIDE 0.95 ML/KG/HR: 9 INJECTION, SOLUTION INTRAVENOUS at 22:41

## 2023-04-22 RX ADMIN — FLUTICASONE PROPIONATE 2 SPRAY: 50 SPRAY, METERED NASAL at 08:24

## 2023-04-22 RX ADMIN — ATENOLOL 25 MG: 25 TABLET ORAL at 20:32

## 2023-04-22 NOTE — PROGRESS NOTES
Nephrology ([facility]) Progress Note      Patient:  Liudmila Ga  YOB: 1950  Date of Service: 4/22/2023  MRN: [id number]   Acct: [id number]   Primary Care Physician: Nigel Ramos MD  Advance Directive:   Code Status and Medical Interventions:   Ordered at: 04/21/23 1358     Code Status (Patient has no pulse and is not breathing):    CPR (Attempt to Resuscitate)     Medical Interventions (Patient has pulse or is breathing):    Full Support     Release to patient:    Routine Release     Admit Date: 4/21/2023       Hospital Day: 0  Referring Provider: Sukhdeep Arevalo MD      Patient Seen, Chart, Consults, Notes, Labs, Radiology studies reviewed.      Subjective:  Liudmila Ga is a 72 y.o. female  whom we were consulted for acute kidney injury.  Patient has hypertension, borderline diabetes and abnormal coronary calcification on CT scan.  She is currently admitted to undergo heart catheterization.  Her serum creatinine was 1.1 on March 31. On 4/21, her serum creatinine was 1.6.  Patient admitted using diclofenac at home.  She also has a history of kidney stones.  In October 2022 she underwent left kidney surgery in [place] for staghorn calculus.  She currently denies chest pain and shortness of rest.  Her main concern is fatigue since her last episode of COVID-19 in 2022.  She has no dysuria.  She tries to stay well-hydrated.  She continues to use the potassium citrate to prevent kidney stones.  Patient is now on normal saline for preparation for heart cath.  Today, she offers no new complaints.    Allergies:  Lortab [hydrocodone-acetaminophen], Percocet [oxycodone-acetaminophen], and Sulfa antibiotics    Home Meds:  Medications Prior to Admission   Medication Sig Dispense Refill Last Dose   • atenolol (TENORMIN) 25 MG tablet Take 1 tablet by mouth Daily.   4/20/2023   • Biotin 23125 MCG tablet Take  by mouth.   4/20/2023   • bisacodyl (DULCOLAX) 5 MG EC  tablet Take 1 tablet by mouth Daily As Needed for Constipation.   4/20/2023   • Cholecalciferol (VITAMIN D3) 2000 units tablet Take  by mouth.   4/20/2023   • dilTIAZem CD (CARDIZEM CD) 120 MG 24 hr capsule Take 1 capsule by mouth Daily.   4/21/2023   • DULoxetine (CYMBALTA) 30 MG capsule Take 1 capsule by mouth 3 (Three) Times a Week.   4/20/2023   • fluticasone (FLONASE) 50 MCG/ACT nasal spray 2 sprays into the nostril(s) as directed by provider Daily.   4/21/2023   • loratadine (CLARITIN) 10 MG tablet Take 1 tablet by mouth Daily.   4/21/2023   • Lysine 500 MG capsule Take 500 mg by mouth.   4/20/2023   • metoclopramide (REGLAN) 5 MG tablet Take 1 tablet by mouth 2 (Two) Times a Day.   4/21/2023   • Omega-3 Fatty Acids (FISH OIL) 1200 MG capsule capsule Take 1 capsule by mouth Daily.   4/20/2023   • pantoprazole (PROTONIX) 40 MG EC tablet Take 1 tablet by mouth Daily.   4/21/2023   • potassium citrate (UROCIT-K) 10 MEQ (1080 MG) CR tablet 1 tablet 2 (Two) Times a Day.   4/20/2023   • rosuvastatin (CRESTOR) 5 MG tablet TAKE 1 TABLET EVERY DAY 90 tablet 3 4/20/2023   • Synthroid 50 MCG tablet    4/21/2023   • vitamin C (ASCORBIC ACID) 500 MG tablet Take 2 tablets by mouth Daily.   4/20/2023   • Zinc 50 MG capsule Take  by mouth.   4/20/2023   • zolpidem (AMBIEN) 10 MG tablet Take 1 tablet by mouth Every Night.   4/20/2023       Medicines:  Current Facility-Administered Medications   Medication Dose Route Frequency Provider Last Rate Last Admin   • ascorbic acid (VITAMIN C) tablet 1,000 mg  1,000 mg Oral Nightly Sukhdeep Arevalo MD       • atenolol (TENORMIN) tablet 25 mg  25 mg Oral Nightly Sukhdeep Arevalo MD   25 mg at 04/21/23 2024   • bisacodyl (DULCOLAX) EC tablet 5 mg  5 mg Oral Daily PRN Sukhdeep Arevalo MD       • dilTIAZem CD (CARDIZEM CD) 24 hr capsule 120 mg  120 mg Oral Daily Sukhdeep Arevalo MD   120 mg at 04/22/23 0824   • DULoxetine (CYMBALTA) DR capsule 30 mg  30 mg Oral Once per day on Mon Wed Fri Irina  MD Sukhdeep       • fluticasone (FLONASE) 50 MCG/ACT nasal spray 2 spray  2 spray Nasal Daily Sukhdeep Arevalo MD   2 spray at 04/22/23 0824   • levothyroxine (SYNTHROID, LEVOTHROID) tablet 50 mcg  50 mcg Oral Q AM Sukhdeep Arevalo MD   50 mcg at 04/22/23 0507   • metoclopramide (REGLAN) tablet 5 mg  5 mg Oral BID With Meals Sukhdeep Arevalo MD       • pantoprazole (PROTONIX) EC tablet 40 mg  40 mg Oral Daily Sukhdeep Arevalo MD   40 mg at 04/22/23 0824   • rosuvastatin (CRESTOR) tablet 5 mg  5 mg Oral Nightly Sukhdeep Arevalo MD   5 mg at 04/21/23 2024   • sodium chloride 0.9 % flush 10 mL  10 mL Intravenous Q12H Sukhdeep Arevalo MD   10 mL at 04/21/23 2025   • sodium chloride 0.9 % flush 10 mL  10 mL Intravenous PRN Sukhdeep Aervalo MD       • sodium chloride 0.9 % infusion  1-3 mL/kg/hr Intravenous Continuous Leatha Montez APRN 75 mL/hr at 04/22/23 0800 0.955 mL/kg/hr at 04/22/23 0800   • zolpidem (AMBIEN) tablet 5 mg  5 mg Oral Nightly Sukhdeep Arevalo MD   5 mg at 04/21/23 2024       Past Medical History:  Past Medical History:   Diagnosis Date   • Arthritis    • Colon polyps    • Disease of thyroid gland    • GERD (gastroesophageal reflux disease)    • Hiatal hernia    • Kidney stone    • Osteopenia    • Overactive bladder    • Vitamin D deficiency        Past Surgical History:  Past Surgical History:   Procedure Laterality Date   • CHOLECYSTECTOMY  2004   • COLONOSCOPY  08/20/2014    HYPERPLASTIC POLYP ILEOCECAL VALVE AND HEPATIC FLEXURE, DIVERTICULA IN SIGMOID COLON   • COLONOSCOPY  02/04/2014    TUBULAR ADENOMA CECAL POLYP, TUBULAR ADENOMA RECTAL POLYP, HYPERPLASTICS POLYPS AT ILEOCECAL VALVE AND RECTAL.   • COLONOSCOPY N/A 12/11/2017    2 Sessile serrated adenomas large intestine and hepatic flexure, 2 tubular adenomas cecum and at 60 cm  repeat exam in 3 years   • COLONOSCOPY N/A 7/23/2020    Procedure: COLONOSCOPY WITH ANESTHESIA;  Surgeon: Stevan Mullins MD;  Location: [facility] ENDOSCOPY;  Service: Gastroenterology;   "Laterality: N/A;  pre: hx adenomatous colon polyp  post: diverticulolsis  Meals, Nigel Chacon MD   • ENDOSCOPY  2015    HIATAL HERNIA, MILD SCHATZKI RING DILATED   • ENDOSCOPY N/A 2017    HH, dilated otherwise normal exam   • ENDOSCOPY N/A 2019    Small HH, NOn-obstructing Schatzki ring dilated, food residue in stomach   • ENDOSCOPY N/A 10/3/2019    Gastritis otherwise normal exam   • HYSTERECTOMY  2011    TOTAL   • TONSILLECTOMY         Family History  Family History   Problem Relation Age of Onset   • Liver cancer Mother    • Colon cancer Brother    • Colon polyps Brother    • Breast cancer Neg Hx    • Ovarian cancer Neg Hx        Social History  Social History     Socioeconomic History   • Marital status:    Tobacco Use   • Smoking status: Former     Types: Cigarettes     Quit date: 2012     Years since quittin.2   • Smokeless tobacco: Never   Vaping Use   • Vaping Use: Never used   Substance and Sexual Activity   • Alcohol use: Not Currently     Comment: nightly   • Drug use: No   • Sexual activity: Never         Review of Systems:  History obtained from chart review and the patient  General ROS: No fever or chills  Respiratory ROS: No cough, shortness of breath, wheezing  Cardiovascular ROS: no chest pain or dyspnea on exertion  Gastrointestinal ROS: No abdominal pain or melena  Genito-Urinary ROS: No dysuria or hematuria  14 point ROS reviewed with the patient and negative except as noted above and in the HPI unless unable to obtain.    Objective:  /60 (BP Location: Right arm, Patient Position: Sitting)   Pulse 66   Temp 98 °F (36.7 °C) (Oral)   Resp 18   Ht 162.6 cm (64.02\")   Wt 77.1 kg (170 lb)   SpO2 95%   BMI 29.17 kg/m²     Intake/Output Summary (Last 24 hours) at 2023 1528  Last data filed at 2023 1300  Gross per 24 hour   Intake 980 ml   Output 1350 ml   Net -370 ml     General: awake/alert    Head: Atraumatic, normocephalic  Neck: No masses, no " JVD  Chest:  clear to auscultation bilaterally without respiratory distress  CVS: regular rate and rhythm  Abdominal: soft, nontender, normal bowel sounds  Extremities: No cyanosis, trace leg edema  Skin: warm and dry without rash  Neuro: No focal motor deficits  Musculoskeletal: No obvious joint effusions    Labs:  Lab Results (last 72 hours)     Procedure Component Value Units Date/Time    Comprehensive Metabolic Panel [[id number]]  (Abnormal) Collected: 04/21/23 1128    Specimen: Blood Updated: 04/21/23 1157     Glucose 122 mg/dL      BUN 44 mg/dL      Creatinine 1.67 mg/dL      Sodium 139 mmol/L      Potassium 5.1 mmol/L      Chloride 106 mmol/L      CO2 22.0 mmol/L      Calcium 9.3 mg/dL      Total Protein 7.2 g/dL      Albumin 4.4 g/dL      ALT (SGPT) 24 U/L      AST (SGOT) 19 U/L      Alkaline Phosphatase 99 U/L      Total Bilirubin 0.3 mg/dL      Globulin 2.8 gm/dL      A/G Ratio 1.6 g/dL      BUN/Creatinine Ratio 26.3     Anion Gap 11.0 mmol/L      eGFR 32.0 mL/min/1.73     Narrative:      GFR Normal >60  Chronic Kidney Disease <60  Kidney Failure <15    The GFR formula is only valid for adults with stable renal function between ages 18 and 70.    CBC (No Diff) [[id number]]  (Abnormal) Collected: 04/21/23 1128    Specimen: Blood Updated: 04/21/23 1140     WBC 7.75 10*3/mm3      RBC 3.50 10*6/mm3      Hemoglobin 10.8 g/dL      Hematocrit 35.3 %      .9 fL      MCH 30.9 pg      MCHC 30.6 g/dL      RDW 13.8 %      RDW-SD 51.4 fl      MPV 11.2 fL      Platelets 259 10*3/mm3           Radiology:   Imaging Results (Last 72 Hours)     Procedure Component Value Units Date/Time    US Renal Bilateral [[id number]] Collected: 04/22/23 0940     Updated: 04/22/23 0946    Narrative:      EXAMINATION: US RENAL BILATERAL- 4/22/2023 9:40 AM CDT     HISTORY: JASIEL; R93.1-Abnormal findings on diagnostic imaging of heart and  coronary circulation     REPORT: Sonographic images of the kidneys were obtained bilaterally.      COMPARISON: Ultrasound of the kidneys 06/28/2022.     The right kidney measures 9.6 x 3.8 x 3.9 cm and has normal morphology  and cortical echogenicity. No mass or hydronephrosis is identified.  Color Doppler images demonstrate vascular flow within the right kidney.  The proximal aorta and IVC are normal caliber. The bladder is not well  distended but appears normal. The left kidney measures 10.3 x 5.2 x 4.0  cm and has normal morphology and cortical echogenicity. No mass or  hydronephrosis is identified. Color Doppler images demonstrate vascular  flow within the left kidney. At the inferior pole the left kidney there  is a nonobstructing nephrolithiasis and measures 0.9 x 0.9 x 0.9 cm.       Impression:      .  1. 9 mm nonobstructing nephrolithiasis at the inferior pole of the left  kidney.   2. No evidence of hydronephrosis.  3. Both kidneys demonstrate normal cortical echogenicity.  This report was finalized on 04/22/2023 09:43 by Dr. Guerrero King MD.          Culture:  No components found for: WOUNDCUL, 3  No components found for: CSFCUL, 3  No components found for: BC, 3  No components found for: URINECUL, 3      Assessment   -Acute kidney injury-prerenal azotemia  -Cannot rule out underlying chronic disease stage IIIa  -Chronic use of NSAIDs  -Coronary calcification  -Hypertension  -Kidney stone  -Iron deficiency anemia    Plan:  Continue gentle IV hydration to decrease risk of contrast-induced nephropathy.  Her kidney function has improved.  Renal ultrasound was reviewed.  Patient was reminded about avoiding NSAIDs once discharged.  She will benefit from outpatient renal follow-up.  We will add oral iron    Jorge A Santa MD  4/22/2023  15:28 CDT

## 2023-04-22 NOTE — PROGRESS NOTES
[facility] -  Progress Note     LOS: 0 days   Patient Care Team:  Rachel, Nigel Chacon MD as PCP - General  Meals, Nigel Chacon MD as PCP - Family Medicine  Stevan Mullins MD as Consulting Physician (Gastroenterology)  Ronny Huff MD as Consulting Physician (Urology)  Sukhdeep Arevalo MD as Cardiologist (Cardiology)  Leatha Montez APRN as Nurse Practitioner (Family Medicine)    Chief Complaint: F/U Elevated CR    Subjective   Sitting on side of bed. Asking about having cardiac catheterization prior to discharge. Family at bedside.       REVIEW OF SYSTEMS:  Constitutional: Denies fever or chills. Denies change in energy level or malaise.  HEENT: Denies headaches or visual disturbances. Denies difficulty swallowing or sore throat.  Respiratory: Denies cough or hoarseness. Denies shortness of breath.   Cardiovascular: Chest pain prior to coming in. Denies palpitations. Denies presyncope/syncope. Denies orthopnea/PND. Denies lower extremity edema.   Gastrointestinal: Denies abdominal pain. Denies nausea/vomiting. Denies change in bowel habits or history of recent GI tract blood loss.   Genitourinary: Denies urinary urgency or frequency. Denies dysuria, hematuria.  Musculoskeletal: Denies pain or swelling in joints.  Neurological: Denies paresthesias. Denies headache. Denies seizure or stroke symptoms.  Behavioral/Psych: Denies problems with anxiety or depression.    All other systems are negative except where stated above.      Objective     Vital Sign Min/Max for last 24 hours  Temp  Min: 97.3 °F (36.3 °C)  Max: 98.2 °F (36.8 °C)   BP  Min: 113/52  Max: 152/57   Pulse  Min: 61  Max: 71   Resp  Min: 16  Max: 20   SpO2  Min: 95 %  Max: 100 %   No data recorded   Weight  Min: 77.1 kg (170 lb)  Max: 77.2 kg (170 lb 3.2 oz)         04/21/23  1557   Weight: 77.1 kg (170 lb)         Intake/Output Summary (Last 24 hours) at 4/22/2023 1040  Last data filed at 4/22/2023 0900  Gross per 24 hour    Intake 500 ml   Output 1350 ml   Net -850 ml         Physical Exam:    General Appearance:    Alert, cooperative, in no acute distress   HEENT:    Normocephalic. Atraumatic. NATHANIEL.    Lungs:     Clear to auscultation, respirations regular, even and unlabored    Heart:    Regular rhythm and normal rate, normal S1 and S2, no murmur, no gallop, no rub, no click   Abdomen:     Normal bowel sounds, soft, non-tender, non-distended   Extremities:   Moves all extremities, no edema, no cyanosis, no redness   Pulses:   Pulses palpable and equal bilaterally   Skin:   No bleeding, bruising or rash   Neurologic:   Grossly intact             Results Review:   Lab Results (last 72 hours)     Procedure Component Value Units Date/Time    Basic Metabolic Panel [[id number]]  (Abnormal) Collected: 04/22/23 0430    Specimen: Blood Updated: 04/22/23 0506     Glucose 97 mg/dL      BUN 31 mg/dL      Creatinine 1.07 mg/dL      Sodium 139 mmol/L      Potassium 5.2 mmol/L      Chloride 109 mmol/L      CO2 22.0 mmol/L      Calcium 9.1 mg/dL      BUN/Creatinine Ratio 29.0     Anion Gap 8.0 mmol/L      eGFR 54.6 mL/min/1.73     Narrative:      GFR Normal >60  Chronic Kidney Disease <60  Kidney Failure <15    The GFR formula is only valid for adults with stable renal function between ages 18 and 70.    Uric Acid [[id number]]  (Abnormal) Collected: 04/22/23 0430    Specimen: Blood Updated: 04/22/23 0506     Uric Acid 7.5 mg/dL     Iron Profile [[id number]]  (Abnormal) Collected: 04/22/23 0430    Specimen: Blood Updated: 04/22/23 0506     Iron 74 mcg/dL      Iron Saturation 19 %      Transferrin 266 mg/dL      TIBC 396 mcg/dL     Creatinine Urine Random (kidney function) GFR component - Urine, Clean Catch [[id number]] Collected: 04/21/23 1833    Specimen: Urine, Clean Catch Updated: 04/22/23 0147     Creatinine, Urine 81.3 mg/dL     Narrative:      Reference intervals for random urine have not been established.  Clinical usage is dependent upon  physician's interpretation in combination with other laboratory tests.       Urinalysis With Microscopic If Indicated (No Culture) - Urine, Clean Catch [[id number]]  (Abnormal) Collected: 04/21/23 1833    Specimen: Urine, Clean Catch Updated: 04/21/23 1924     Color, UA Yellow     Appearance, UA Clear     pH, UA <=5.0     Specific Gravity, UA 1.016     Glucose, UA Negative     Ketones, UA Negative     Bilirubin, UA Negative     Blood, UA Negative     Protein, UA Negative     Leuk Esterase, UA Small (1+)     Nitrite, UA Negative     Urobilinogen, UA 0.2 E.U./dL    Urinalysis, Microscopic Only - Urine, Clean Catch [[id number]]  (Abnormal) Collected: 04/21/23 1833    Specimen: Urine, Clean Catch Updated: 04/21/23 1924     RBC, UA 0-2 /HPF      WBC, UA 0-2 /HPF      Bacteria, UA Trace /HPF      Squamous Epithelial Cells, UA 0-2 /HPF      Hyaline Casts, UA 3-6 /LPF      Methodology Manual Light Microscopy    Sodium, Urine, Random - Urine, Clean Catch [[id number]] Collected: 04/21/23 1833    Specimen: Urine, Clean Catch Updated: 04/21/23 1918     Sodium, Urine 93 mmol/L     Narrative:      Reference intervals for random urine have not been established.  Clinical usage is dependent upon physician's interpretation in combination with other laboratory tests.       PTH, Intact [[id number]]  (Normal) Collected: 04/21/23 1756    Specimen: Blood Updated: 04/21/23 1819     PTH, Intact 35.9 pg/mL     Narrative:      Results may be falsely decreased if patient taking Biotin.      Comprehensive Metabolic Panel [[id number]]  (Abnormal) Collected: 04/21/23 1128    Specimen: Blood Updated: 04/21/23 1157     Glucose 122 mg/dL      BUN 44 mg/dL      Creatinine 1.67 mg/dL      Sodium 139 mmol/L      Potassium 5.1 mmol/L      Chloride 106 mmol/L      CO2 22.0 mmol/L      Calcium 9.3 mg/dL      Total Protein 7.2 g/dL      Albumin 4.4 g/dL      ALT (SGPT) 24 U/L      AST (SGOT) 19 U/L      Alkaline Phosphatase 99 U/L      Total Bilirubin 0.3  mg/dL      Globulin 2.8 gm/dL      A/G Ratio 1.6 g/dL      BUN/Creatinine Ratio 26.3     Anion Gap 11.0 mmol/L      eGFR 32.0 mL/min/1.73     Narrative:      GFR Normal >60  Chronic Kidney Disease <60  Kidney Failure <15    The GFR formula is only valid for adults with stable renal function between ages 18 and 70.    CBC (No Diff) [[id number]]  (Abnormal) Collected: 04/21/23 1128    Specimen: Blood Updated: 04/21/23 1140     WBC 7.75 10*3/mm3      RBC 3.50 10*6/mm3      Hemoglobin 10.8 g/dL      Hematocrit 35.3 %      .9 fL      MCH 30.9 pg      MCHC 30.6 g/dL      RDW 13.8 %      RDW-SD 51.4 fl      MPV 11.2 fL      Platelets 259 10*3/mm3              Medication Review: yes  Current Facility-Administered Medications   Medication Dose Route Frequency Provider Last Rate Last Admin   • ascorbic acid (VITAMIN C) tablet 1,000 mg  1,000 mg Oral Nightly Sukhdeep Arevalo MD       • atenolol (TENORMIN) tablet 25 mg  25 mg Oral Nightly Sukhdeep Arevalo MD   25 mg at 04/21/23 2024   • bisacodyl (DULCOLAX) EC tablet 5 mg  5 mg Oral Daily PRN Sukhdeep Arevalo MD       • dilTIAZem CD (CARDIZEM CD) 24 hr capsule 120 mg  120 mg Oral Daily Sukhdeep Arevalo MD   120 mg at 04/22/23 0824   • DULoxetine (CYMBALTA) DR capsule 30 mg  30 mg Oral Once per day on Mon Wed Fri Sukhdeep Arevalo MD       • fluticasone (FLONASE) 50 MCG/ACT nasal spray 2 spray  2 spray Nasal Daily Sukhdeep Arevalo MD   2 spray at 04/22/23 0824   • levothyroxine (SYNTHROID, LEVOTHROID) tablet 50 mcg  50 mcg Oral Q AM Sukhdeep Arevalo MD   50 mcg at 04/22/23 0507   • metoclopramide (REGLAN) tablet 5 mg  5 mg Oral BID With Meals Sukhdeep Arevalo MD       • pantoprazole (PROTONIX) EC tablet 40 mg  40 mg Oral Daily Sukhdeep Arevalo MD   40 mg at 04/22/23 0824   • rosuvastatin (CRESTOR) tablet 5 mg  5 mg Oral Nightly Sukhdeep Arevalo MD   5 mg at 04/21/23 2024   • sodium chloride 0.9 % flush 10 mL  10 mL Intravenous Q12H Sukhdeep Arevalo MD   10 mL at 04/21/23 2025   • sodium chloride 0.9 %  flush 10 mL  10 mL Intravenous PRN Sukhdeep Arevalo MD       • sodium chloride 0.9 % infusion  1-3 mL/kg/hr Intravenous Continuous Leatha Montez APRN 75 mL/hr at 04/22/23 0800 0.955 mL/kg/hr at 04/22/23 0800   • zolpidem (AMBIEN) tablet 5 mg  5 mg Oral Nightly Sukhdeep Arevalo MD   5 mg at 04/21/23 2024         Assessment & Plan       Abnormal cardiac CT angiography - Plan for cardiac catheterization on 04/21/2023 cancelled 2' to JASIEL. CR returned to normal after IVF hydration. Plan for cardiac catheterization on 04/23/2023.     JASIEL - Improved with IVF Hydration - Nephrology following    Primary Essential HTN - Controlled     Hx PSVT - on Cardizem, BB    Mixed Hyperlipidemia - on Statin    Acquired Hypothyroidism - on Replacement    Renal Calculi, Note on Renal US - Hx Surgical Removal in the past    GERD - on PPI    Plan for cardiac catheterization tomorrow.     ALTHEA Baker  04/22/23  10:40 CDT

## 2023-04-22 NOTE — PLAN OF CARE
Goal Outcome Evaluation:  Plan of Care Reviewed With: patient        Progress: improving  Outcome Evaluation: VSS. YAN. SR on tele. No c/o pain. IV fluids. Monitor AM labs. Renal US 4/22.

## 2023-04-22 NOTE — PLAN OF CARE
Goal Outcome Evaluation:  Plan of Care Reviewed With: (P) patient        Progress: (P) improving  Outcome Evaluation: (P) VSS. RA. S/SA 60-96. No c/o pain. IV fluids. cardiac cath planned 4/23. safety maintained.

## 2023-04-23 VITALS
WEIGHT: 170.86 LBS | TEMPERATURE: 97.5 F | DIASTOLIC BLOOD PRESSURE: 54 MMHG | BODY MASS INDEX: 29.17 KG/M2 | HEART RATE: 61 BPM | RESPIRATION RATE: 16 BRPM | HEIGHT: 64 IN | SYSTOLIC BLOOD PRESSURE: 119 MMHG | OXYGEN SATURATION: 95 %

## 2023-04-23 LAB
ANION GAP SERPL CALCULATED.3IONS-SCNC: 10 MMOL/L (ref 5–15)
BUN SERPL-MCNC: 22 MG/DL (ref 8–23)
BUN/CREAT SERPL: 26.5 (ref 7–25)
CALCIUM SPEC-SCNC: 9.3 MG/DL (ref 8.6–10.5)
CHLORIDE SERPL-SCNC: 110 MMOL/L (ref 98–107)
CO2 SERPL-SCNC: 21 MMOL/L (ref 22–29)
CREAT SERPL-MCNC: 0.83 MG/DL (ref 0.57–1)
EGFRCR SERPLBLD CKD-EPI 2021: 74.1 ML/MIN/1.73
GLUCOSE SERPL-MCNC: 100 MG/DL (ref 65–99)
POTASSIUM SERPL-SCNC: 4.8 MMOL/L (ref 3.5–5.2)
SODIUM SERPL-SCNC: 141 MMOL/L (ref 136–145)

## 2023-04-23 PROCEDURE — C1894 INTRO/SHEATH, NON-LASER: HCPCS | Performed by: EMERGENCY MEDICINE

## 2023-04-23 PROCEDURE — 80048 BASIC METABOLIC PNL TOTAL CA: CPT | Performed by: INTERNAL MEDICINE

## 2023-04-23 PROCEDURE — 25010000002 FENTANYL CITRATE (PF) 50 MCG/ML SOLUTION: Performed by: EMERGENCY MEDICINE

## 2023-04-23 PROCEDURE — 94760 N-INVAS EAR/PLS OXIMETRY 1: CPT

## 2023-04-23 PROCEDURE — 93458 L HRT ARTERY/VENTRICLE ANGIO: CPT | Performed by: EMERGENCY MEDICINE

## 2023-04-23 PROCEDURE — 25010000002 HEPARIN (PORCINE) 2000-0.9 UNIT/L-% SOLUTION: Performed by: EMERGENCY MEDICINE

## 2023-04-23 PROCEDURE — C1769 GUIDE WIRE: HCPCS | Performed by: EMERGENCY MEDICINE

## 2023-04-23 PROCEDURE — 25010000002 DIPHENHYDRAMINE PER 50 MG: Performed by: EMERGENCY MEDICINE

## 2023-04-23 PROCEDURE — 25010000002 HEPARIN (PORCINE) 1000-0.9 UT/500ML-% SOLUTION: Performed by: EMERGENCY MEDICINE

## 2023-04-23 PROCEDURE — 99152 MOD SED SAME PHYS/QHP 5/>YRS: CPT | Performed by: EMERGENCY MEDICINE

## 2023-04-23 PROCEDURE — 25010000002 HEPARIN (PORCINE) PER 1000 UNITS: Performed by: EMERGENCY MEDICINE

## 2023-04-23 PROCEDURE — 25510000001 IOPAMIDOL PER 1 ML: Performed by: EMERGENCY MEDICINE

## 2023-04-23 PROCEDURE — 25010000002 MIDAZOLAM PER 1 MG: Performed by: EMERGENCY MEDICINE

## 2023-04-23 PROCEDURE — 94799 UNLISTED PULMONARY SVC/PX: CPT

## 2023-04-23 RX ORDER — HEPARIN SODIUM 200 [USP'U]/100ML
INJECTION, SOLUTION INTRAVENOUS
Status: DISCONTINUED | OUTPATIENT
Start: 2023-04-23 | End: 2023-04-23 | Stop reason: HOSPADM

## 2023-04-23 RX ORDER — VERAPAMIL HYDROCHLORIDE 2.5 MG/ML
INJECTION, SOLUTION INTRAVENOUS
Status: DISCONTINUED | OUTPATIENT
Start: 2023-04-23 | End: 2023-04-23 | Stop reason: HOSPADM

## 2023-04-23 RX ORDER — MIDAZOLAM HYDROCHLORIDE 1 MG/ML
INJECTION INTRAMUSCULAR; INTRAVENOUS
Status: DISCONTINUED | OUTPATIENT
Start: 2023-04-23 | End: 2023-04-23 | Stop reason: HOSPADM

## 2023-04-23 RX ORDER — SODIUM CHLORIDE 9 MG/ML
100 INJECTION, SOLUTION INTRAVENOUS CONTINUOUS
Status: DISCONTINUED | OUTPATIENT
Start: 2023-04-23 | End: 2023-04-23 | Stop reason: HOSPADM

## 2023-04-23 RX ORDER — FENTANYL CITRATE 50 UG/ML
INJECTION, SOLUTION INTRAMUSCULAR; INTRAVENOUS
Status: DISCONTINUED | OUTPATIENT
Start: 2023-04-23 | End: 2023-04-23 | Stop reason: HOSPADM

## 2023-04-23 RX ORDER — DIPHENHYDRAMINE HYDROCHLORIDE 50 MG/ML
INJECTION INTRAMUSCULAR; INTRAVENOUS
Status: DISCONTINUED | OUTPATIENT
Start: 2023-04-23 | End: 2023-04-23 | Stop reason: HOSPADM

## 2023-04-23 RX ORDER — LIDOCAINE HYDROCHLORIDE 20 MG/ML
INJECTION, SOLUTION INFILTRATION; PERINEURAL
Status: DISCONTINUED | OUTPATIENT
Start: 2023-04-23 | End: 2023-04-23 | Stop reason: HOSPADM

## 2023-04-23 RX ORDER — HEPARIN SODIUM 1000 [USP'U]/ML
INJECTION, SOLUTION INTRAVENOUS; SUBCUTANEOUS
Status: DISCONTINUED | OUTPATIENT
Start: 2023-04-23 | End: 2023-04-23 | Stop reason: HOSPADM

## 2023-04-23 RX ORDER — FERROUS SULFATE 325(65) MG
325 TABLET ORAL
Qty: 30 TABLET | Refills: 5 | Status: SHIPPED | OUTPATIENT
Start: 2023-04-24

## 2023-04-23 RX ADMIN — DILTIAZEM HYDROCHLORIDE 120 MG: 120 CAPSULE, COATED, EXTENDED RELEASE ORAL at 07:38

## 2023-04-23 RX ADMIN — LEVOTHYROXINE SODIUM 50 MCG: 50 TABLET ORAL at 05:04

## 2023-04-23 RX ADMIN — SODIUM CHLORIDE 100 ML/HR: 9 INJECTION, SOLUTION INTRAVENOUS at 09:31

## 2023-04-23 NOTE — DISCHARGE SUMMARY
[facility] HEART GROUP DISCHARGE    Date of Discharge:  4/23/2023    Discharge Diagnosis:     Abnormal cardiac CT angiography      JASIEL     Primary Essential HTN    Hx PSVT    Mixed Hyperlipidemia     Acquired Hypothyroidism    Renal Calculi, Note on Renal US    GERD    Presenting Problem/History of Present Illness  Abnormal cardiac CT angiography [R93.1]        Hospital Course  Ms. Ga is a 72 y.o. female who has been experiencing ongoing shortness of breath and back pain since 2nd episode of COVID in September of last year. She has PMHx HTN, PSVT on Cardizem, HLD, hypothyroidism, and GERD.     She was seen in the cardiology office and cardiac CT was ordered. Patient found to have total calcium score of 164. Left main 85.6, LAD 76.4, LCX 0, and RCA 2. With these findings, cardiac catheterization was recommended. The procedure was explained and discussed in detail, including the risks and benefits. She voiced understanding and was agreeable.     Patient presented on 04/21/2023. Preoperative labs were collected and her CR was 1.67. She had no known prior history of CKD, therefore, her cardiac catheterization was cancelled. She was admitted for IVF hydration with nephrology consultation.     Patient was seen by Dr. Santa, Nephrology. Renal US was unremarkable, except for 9 mm non-obstructing renal calculi at the inferior pole of the left kidney. Recheck of BMP demonstrated continued improvement of renal function.     Ms. Ga and family wished to have cardiac catheterization completed sooner than later. Therefore, she underwent cardiac catheterization via right radial approach by Dr. Suárez on 04/23/2023. No significant CAD was noted with normal LV systolic function. She tolerated the procedure without complication.     Patient was monitored postoperatively and felt stable for discharge home.     Procedures Performed  1. Cardiac Catheterization by Dr. Osiel Suárez.       Consults:   1.   Jorge A Santa, Nephrology      Condition on Discharge:  Stable    Physical Exam at Discharge    Vital Signs  Temp:  [97.5 °F (36.4 °C)-98.1 °F (36.7 °C)] 97.5 °F (36.4 °C)  Heart Rate:  [61-68] 61  Resp:  [14-18] 16  BP: (115-150)/(49-80) 119/54    Physical Exam:     General Appearance:    Alert, cooperative, in no acute distress   HEENT:    Normocephalic. Atraumatic. NATHANIEL.    Lungs:     Clear to auscultation, respirations regular, even and unlabored    Heart:    Regular rhythm and normal rate, normal S1 and S2, no murmur, no gallop, no rub, no click   Abdomen:     Normal bowel sounds, soft, non-tender, non-distended   Extremities:   Moves all extremities, no edema, no cyanosis, no redness. Right radial puncture site without evidence of bleeding or hematoma. Strong palpable radial pulse.    Pulses:   Pulses palpable and equal bilaterally   Skin:   No bleeding, bruising or rash.    Neurologic:   Grossly intact             Discharge Disposition  Home    Discharge Medications     Discharge Medications      New Medications      Instructions Start Date   ferrous sulfate 325 (65 FE) MG tablet   325 mg, Oral, Daily With Breakfast   Start Date: April 24, 2023        Continue These Medications      Instructions Start Date   atenolol 25 MG tablet  Commonly known as: TENORMIN   25 mg, Oral, Daily      Biotin 17769 MCG tablet   Oral      bisacodyl 5 MG EC tablet  Commonly known as: DULCOLAX   5 mg, Oral, Daily PRN      dilTIAZem  MG 24 hr capsule  Commonly known as: CARDIZEM CD   120 mg, Oral, Daily      DULoxetine 20 MG capsule  Commonly known as: CYMBALTA   20 mg, Oral, Every 72 Hours      erythromycin 5 MG/GM ophthalmic ointment  Commonly known as: ROMYCIN   1 application, Both Eyes, Nightly      fish oil 1200 MG capsule capsule   1,200 mg, Oral, Daily      fluticasone 50 MCG/ACT nasal spray  Commonly known as: FLONASE   2 sprays, Nasal, Daily      loratadine 10 MG tablet  Commonly known as: CLARITIN   10 mg, Oral,  Daily      Lysine 500 MG capsule   500 mg, Oral      metoclopramide 5 MG tablet  Commonly known as: REGLAN   5 mg, Oral, 2 Times Daily      pantoprazole 40 MG EC tablet  Commonly known as: PROTONIX   40 mg, Oral, Daily      potassium citrate 10 MEQ (1080 MG) CR tablet  Commonly known as: UROCIT-K   10 mEq, 2 Times Daily      rosuvastatin 5 MG tablet  Commonly known as: CRESTOR   TAKE 1 TABLET EVERY DAY      Synthroid 50 MCG tablet  Generic drug: levothyroxine   50 mcg, Oral, Daily      vitamin C 500 MG tablet  Commonly known as: ASCORBIC ACID   1,000 mg, Oral, Daily      Vitamin D3 50 MCG (2000 UT) tablet   Oral      Zinc 50 MG capsule   Oral      zolpidem 10 MG tablet  Commonly known as: AMBIEN   10 mg, Oral, Nightly         Stop These Medications    diclofenac 75 MG EC tablet  Commonly known as: VOLTAREN            Discharge Diet: Regular, as at home    Activity at Discharge: Gradually resume normal activity    Follow-up Appointments  1. Hospital Follow-up with Dr. Nigel Ramos in 1 week with repeat BMP. Patient also needs complete PFT.  2. Cardiology Follow-up with Dr. Suárez in 4 weeks.   3. Nephrology Follow-up with Dr. Santa in 4 - 6 weeks. Office should call patient.       Total discharge time including review, examination, and education was 25 minutes.        ALTHEA Baker  04/23/23  13:29 CDT

## 2023-04-23 NOTE — PROGRESS NOTES
Nephrology ([facility]) Progress Note      Patient:  Liudmila Ga  YOB: 1950  Date of Service: 4/23/2023  MRN: [id number]   Acct: [id number]   Primary Care Physician: Nigel Ramos MD  Advance Directive:   Code Status and Medical Interventions:   Ordered at: 04/21/23 1358     Code Status (Patient has no pulse and is not breathing):    CPR (Attempt to Resuscitate)     Medical Interventions (Patient has pulse or is breathing):    Full Support     Release to patient:    Routine Release     Admit Date: 4/21/2023       Hospital Day: 0  Referring Provider: Sukhdeep Arevalo MD      Patient Seen, Chart, Consults, Notes, Labs, Radiology studies reviewed.      Subjective:  Liudmila Ga is a 72 y.o. female  whom we were consulted for acute kidney injury.  Patient has hypertension, borderline diabetes and abnormal coronary calcification on CT scan.  She is currently admitted to undergo heart catheterization.  Her serum creatinine was 1.1 on March 31. On 4/21, her serum creatinine was 1.6.  Patient admitted using diclofenac at home.  She also has a history of kidney stones.  In October 2022 she underwent left kidney surgery in [place] for staghorn calculus.  She currently denies chest pain and shortness of rest.  Her main concern is fatigue since her last episode of COVID-19 in 2022.  She has no dysuria.  She tries to stay well-hydrated.  She continues to use the potassium citrate to prevent kidney stones.  Patient is now on normal saline for preparation for heart cath.  Today, she offers no new complaints.  She is status post heart cath.  She continues to have good urine output.    Allergies:  Lortab [hydrocodone-acetaminophen], Percocet [oxycodone-acetaminophen], and Sulfa antibiotics    Home Meds:  No medications prior to admission.       Medicines:  Current Facility-Administered Medications   Medication Dose Route Frequency Provider Last Rate Last Admin   •  ascorbic acid (VITAMIN C) tablet 1,000 mg  1,000 mg Oral Nightly Osiel Suárez DO   1,000 mg at 04/22/23 2032   • atenolol (TENORMIN) tablet 25 mg  25 mg Oral Nightly Osiel Suárez, DO   25 mg at 04/22/23 2032   • bisacodyl (DULCOLAX) EC tablet 5 mg  5 mg Oral Daily PRN Osiel Suárez DO       • dilTIAZem CD (CARDIZEM CD) 24 hr capsule 120 mg  120 mg Oral Daily Osiel Suárez DO   120 mg at 04/23/23 0738   • DULoxetine (CYMBALTA) DR capsule 30 mg  30 mg Oral Once per day on Mon Wed Fri Osiel Suárez DO       • ferrous sulfate tablet 325 mg  325 mg Oral Daily With Breakfast Osiel Suárez DO       • fluticasone (FLONASE) 50 MCG/ACT nasal spray 2 spray  2 spray Nasal Daily Osiel Suárez DO   2 spray at 04/22/23 0824   • levothyroxine (SYNTHROID, LEVOTHROID) tablet 50 mcg  50 mcg Oral Q AM Osiel Suárez DO   50 mcg at 04/23/23 0504   • metoclopramide (REGLAN) tablet 5 mg  5 mg Oral BID With Meals Osiel Suárez DO   5 mg at 04/22/23 1716   • pantoprazole (PROTONIX) EC tablet 40 mg  40 mg Oral Daily Osiel Suárez DO   40 mg at 04/22/23 0824   • rosuvastatin (CRESTOR) tablet 5 mg  5 mg Oral Nightly Osiel Suárez DO   5 mg at 04/22/23 2032   • sodium chloride 0.9 % flush 10 mL  10 mL Intravenous Q12H Osiel Suárez DO   10 mL at 04/22/23 2034   • sodium chloride 0.9 % flush 10 mL  10 mL Intravenous PRN Osiel Suárez DO       • sodium chloride 0.9 % infusion  1-3 mL/kg/hr Intravenous Continuous Leatha Montez APRN 75 mL/hr at 04/23/23 0508 0.955 mL/kg/hr at 04/23/23 0508   • sodium chloride 0.9 % infusion  100 mL/hr Intravenous Continuous Osiel Suárez  mL/hr at 04/23/23 0931 100 mL/hr at 04/23/23 0931   • zolpidem (AMBIEN) tablet 5 mg  5 mg Oral Nightly Osiel Suárez DO   5 mg at 04/22/23 2032     Current Outpatient  Medications   Medication Sig Dispense Refill   • atenolol (TENORMIN) 25 MG tablet Take 1 tablet by mouth Daily.     • Biotin 62930 MCG tablet Take  by mouth.     • bisacodyl (DULCOLAX) 5 MG EC tablet Take 1 tablet by mouth Daily As Needed for Constipation.     • Cholecalciferol (VITAMIN D3) 2000 units tablet Take  by mouth.     • dilTIAZem CD (CARDIZEM CD) 120 MG 24 hr capsule Take 1 capsule by mouth Daily.     • DULoxetine (CYMBALTA) 20 MG capsule Take 1 capsule by mouth Every 72 (Seventy-Two) Hours.     • [START ON 4/24/2023] ferrous sulfate 325 (65 FE) MG tablet Take 1 tablet by mouth Daily With Breakfast. 30 tablet 5   • fluticasone (FLONASE) 50 MCG/ACT nasal spray 2 sprays into the nostril(s) as directed by provider Daily.     • loratadine (CLARITIN) 10 MG tablet Take 1 tablet by mouth Daily.     • Lysine 500 MG capsule Take 500 mg by mouth.     • metoclopramide (REGLAN) 5 MG tablet Take 1 tablet by mouth 2 (Two) Times a Day.     • Omega-3 Fatty Acids (FISH OIL) 1200 MG capsule capsule Take 1 capsule by mouth Daily.     • pantoprazole (PROTONIX) 40 MG EC tablet Take 1 tablet by mouth Daily.     • potassium citrate (UROCIT-K) 10 MEQ (1080 MG) CR tablet 1 tablet 2 (Two) Times a Day.     • rosuvastatin (CRESTOR) 5 MG tablet TAKE 1 TABLET EVERY DAY 90 tablet 3   • Synthroid 50 MCG tablet Take 1 tablet by mouth Daily.     • vitamin C (ASCORBIC ACID) 500 MG tablet Take 2 tablets by mouth Daily.     • Zinc 50 MG capsule Take  by mouth.     • zolpidem (AMBIEN) 10 MG tablet Take 1 tablet by mouth Every Night.     • erythromycin (ROMYCIN) 5 MG/GM ophthalmic ointment Administer 1 application to both eyes Every Night.         Past Medical History:  Past Medical History:   Diagnosis Date   • Arthritis    • Colon polyps    • Disease of thyroid gland    • GERD (gastroesophageal reflux disease)    • Hiatal hernia    • Kidney stone    • Osteopenia    • Overactive bladder    • Vitamin D deficiency        Past Surgical  History:  Past Surgical History:   Procedure Laterality Date   • CHOLECYSTECTOMY     • COLONOSCOPY  2014    HYPERPLASTIC POLYP ILEOCECAL VALVE AND HEPATIC FLEXURE, DIVERTICULA IN SIGMOID COLON   • COLONOSCOPY  2014    TUBULAR ADENOMA CECAL POLYP, TUBULAR ADENOMA RECTAL POLYP, HYPERPLASTICS POLYPS AT ILEOCECAL VALVE AND RECTAL.   • COLONOSCOPY N/A 2017    2 Sessile serrated adenomas large intestine and hepatic flexure, 2 tubular adenomas cecum and at 60 cm  repeat exam in 3 years   • COLONOSCOPY N/A 2020    Procedure: COLONOSCOPY WITH ANESTHESIA;  Surgeon: Stevan Mullins MD;  Location: [facility] ENDOSCOPY;  Service: Gastroenterology;  Laterality: N/A;  pre: hx adenomatous colon polyp  post: diverticulolsis  Meals, Nigel Chacon MD   • ENDOSCOPY  2015    HIATAL HERNIA, MILD SCHATZKI RING DILATED   • ENDOSCOPY N/A 2017    HH, dilated otherwise normal exam   • ENDOSCOPY N/A 2019    Small HH, NOn-obstructing Schatzki ring dilated, food residue in stomach   • ENDOSCOPY N/A 10/3/2019    Gastritis otherwise normal exam   • HYSTERECTOMY  2011    TOTAL   • TONSILLECTOMY         Family History  Family History   Problem Relation Age of Onset   • Liver cancer Mother    • Colon cancer Brother    • Colon polyps Brother    • Breast cancer Neg Hx    • Ovarian cancer Neg Hx        Social History  Social History     Socioeconomic History   • Marital status:    Tobacco Use   • Smoking status: Former     Types: Cigarettes     Quit date: 2012     Years since quittin.2   • Smokeless tobacco: Never   Vaping Use   • Vaping Use: Never used   Substance and Sexual Activity   • Alcohol use: Not Currently     Comment: nightly   • Drug use: No   • Sexual activity: Never         Review of Systems:  History obtained from chart review and the patient  General ROS: No fever or chills  Respiratory ROS: No cough, shortness of breath, wheezing  Cardiovascular ROS: no chest pain or dyspnea on  "exertion  Gastrointestinal ROS: No abdominal pain or melena  Genito-Urinary ROS: No dysuria or hematuria  14 point ROS reviewed with the patient and negative except as noted above and in the HPI unless unable to obtain.    Objective:  /54 (BP Location: Left arm, Patient Position: Lying)   Pulse 61   Temp 97.5 °F (36.4 °C) (Oral)   Resp 16   Ht 162.6 cm (64.02\")   Wt 77.5 kg (170 lb 13.7 oz)   SpO2 95%   BMI 29.31 kg/m²     Intake/Output Summary (Last 24 hours) at 4/23/2023 1446  Last data filed at 4/23/2023 1300  Gross per 24 hour   Intake 480 ml   Output --   Net 480 ml     General: awake/alert    Head: Atraumatic, normocephalic  Neck: No masses, no JVD  Chest:  clear to auscultation bilaterally without respiratory distress  CVS: regular rate and rhythm  Abdominal: soft, nontender, normal bowel sounds  Extremities: No cyanosis, trace leg edema  Skin: warm and dry without rash  Neuro: No focal motor deficits  Musculoskeletal: No obvious joint effusions    Labs:  Lab Results (last 72 hours)     Procedure Component Value Units Date/Time    Comprehensive Metabolic Panel [[id number]]  (Abnormal) Collected: 04/21/23 1128    Specimen: Blood Updated: 04/21/23 1157     Glucose 122 mg/dL      BUN 44 mg/dL      Creatinine 1.67 mg/dL      Sodium 139 mmol/L      Potassium 5.1 mmol/L      Chloride 106 mmol/L      CO2 22.0 mmol/L      Calcium 9.3 mg/dL      Total Protein 7.2 g/dL      Albumin 4.4 g/dL      ALT (SGPT) 24 U/L      AST (SGOT) 19 U/L      Alkaline Phosphatase 99 U/L      Total Bilirubin 0.3 mg/dL      Globulin 2.8 gm/dL      A/G Ratio 1.6 g/dL      BUN/Creatinine Ratio 26.3     Anion Gap 11.0 mmol/L      eGFR 32.0 mL/min/1.73     Narrative:      GFR Normal >60  Chronic Kidney Disease <60  Kidney Failure <15    The GFR formula is only valid for adults with stable renal function between ages 18 and 70.    CBC (No Diff) [[id number]]  (Abnormal) Collected: 04/21/23 1128    Specimen: Blood Updated: 04/21/23 " 1140     WBC 7.75 10*3/mm3      RBC 3.50 10*6/mm3      Hemoglobin 10.8 g/dL      Hematocrit 35.3 %      .9 fL      MCH 30.9 pg      MCHC 30.6 g/dL      RDW 13.8 %      RDW-SD 51.4 fl      MPV 11.2 fL      Platelets 259 10*3/mm3           Radiology:   Imaging Results (Last 72 Hours)     Procedure Component Value Units Date/Time    US Renal Bilateral [[id number]] Collected: 04/22/23 0940     Updated: 04/22/23 0946    Narrative:      EXAMINATION: US RENAL BILATERAL- 4/22/2023 9:40 AM CDT     HISTORY: JASIEL; R93.1-Abnormal findings on diagnostic imaging of heart and  coronary circulation     REPORT: Sonographic images of the kidneys were obtained bilaterally.     COMPARISON: Ultrasound of the kidneys 06/28/2022.     The right kidney measures 9.6 x 3.8 x 3.9 cm and has normal morphology  and cortical echogenicity. No mass or hydronephrosis is identified.  Color Doppler images demonstrate vascular flow within the right kidney.  The proximal aorta and IVC are normal caliber. The bladder is not well  distended but appears normal. The left kidney measures 10.3 x 5.2 x 4.0  cm and has normal morphology and cortical echogenicity. No mass or  hydronephrosis is identified. Color Doppler images demonstrate vascular  flow within the left kidney. At the inferior pole the left kidney there  is a nonobstructing nephrolithiasis and measures 0.9 x 0.9 x 0.9 cm.       Impression:      .  1. 9 mm nonobstructing nephrolithiasis at the inferior pole of the left  kidney.   2. No evidence of hydronephrosis.  3. Both kidneys demonstrate normal cortical echogenicity.  This report was finalized on 04/22/2023 09:43 by Dr. Guerrero King MD.          Culture:  No components found for: WOUNDCUL, 3  No components found for: CSFCUL, 3  No components found for: BC, 3  No components found for: URINECUL, 3      Assessment   -Acute kidney injury-prerenal azotemia  -Cannot rule out underlying chronic disease stage IIIa  -Chronic use of  NSAIDs  -Coronary calcification  -Hypertension  -Kidney stone  -Iron deficiency anemia    Plan:  Patient was advised to continue oral hydration.  Follow-up as outpatient at the renal clinic.  Continue oral iron.    Jorge A Santa MD  4/23/2023  14:46 CDT

## 2023-04-23 NOTE — PLAN OF CARE
Goal Outcome Evaluation:  Plan of Care Reviewed With: patient        Progress: improving  Outcome Evaluation: KATE HEREDIA. SR 65-79 on tele. No c/o pain. IV fluids. NPO for heart cath today. Safety maintained.

## 2023-04-23 NOTE — OP NOTE
[facility] HEART GROUP    Date of procedure: 4/23/2023     Procedures performed:     1.  Access to the right radial artery via modified Seldinger technique  2.  Left heart catheterization with retrograde crossing aortic valve to the left ventricle  3.  LV ventriculography  4.  Selective bilateral coronary angiography  5.  Conscious sedation with continuous hemodynamic monitoring for 15 minutes  6.  Patent hemostasis achieved in the right radial artery access site using a TR band      Risk, Benefits, and Alternatives discussed with the patient and/or family.  Plan is for moderate sedation, and the patient agrees to proceed with the procedure.  An immediate assessment was done prior to the administration of moderate sedation        Indication: Abnormal CT coronary  Premedication: Versed, Fentanyl  Contrast: Isovue 77 cc  Radiation: Flouro time= 2.8 minutes. Air Kerma= 171 mGy  Catheters: 5F TIG, pigtail    Procedural details:    The patient was brought to the cath lab and prepped and draped in the usual fashion.  Access was obtained in the right radial artery via modified Seldinger technique.  A 6 Japanese sheath was placed into the artery and flushed.  Next, TIG catheter was inserted and used to engage both the left and right coronary arteries and selective bilateral coronary angiography was performed in multiple views.  Next, pigtail catheter was inserted and used to cross the aortic valve to the left ventricle pressure recorded.  LV ventriculography was then performed.  This catheter was then withdrawn back cross the aortic valve and again pressures were recorded.  Everything was then withdrawn through the sheath and the sheath was flushed.  Patent hemostasis was achieved in the right radial artery access site using a TR band.  Patient tolerated procedure well without any complications.  With Cath Lab stable condition.      I supervised the administration of conscious sedation by nursing staff  throughout the case.  First dose was given at 0850 and the end of my face-to-face encounter was at 0906, accounting for a total of 15 minutes of supervision.  During the case, continuous pulse oximetry, heart rate, blood pressure, and patient status were monitored.     Findings:    Hemodynamics:    Aorta: 109/59 mmHg  LV: 108/3 mmHg  LVEDP: 20 mmHg      Left ventriculography:  1. The contractility of the left ventricle is normal.  Estimated ejection fraction 65%.  2.  No significant gradient across the aortic valve on pullback    Selective coronary angiography:     Left main: Left main is a large-caliber vessel that bifurcates into the LAD and left circumflex coronary arteries, no angiographic evidence of stenosis, PAT-3 flow    LAD: The LAD is a large-caliber vessel, there is approximately 20 to 30% stenosis in the proximal segment with some calcification noted, remainder of the vessel has mild luminal irregularities, PAT-3 flow    Diagonals: First diagonal is small to moderate caliber with no significant disease, the second diagonal small caliber with approximately 70 to 80% stenosis at the ostium, the third diagonal is moderate caliber with no significant disease    Left circumflex: Left circumflex is a large-caliber vessel, mild disease noted in the proximal segment, PAT-3 flow    Obtuse marginals: Small caliber vessels    RCA: The RCA is a large-caliber vessel with mild disease noted in the proximal/mid segment, PAT-3 flow    PDA/YAYO: PDA is moderate caliber with no significant disease, the YAYO is large caliber with no significant disease      Estimated Blood Loss: Minimal    Specimens: None    Complications: None     Impression:    1.  Mild, nonobstructive coronary artery disease as described above  2.  Acute kidney injury, resolved  3.  Hypertension  4.  Paroxysmal supraventricular tachycardia  5.  Hyperlipidemia       Plan:     1.  TR band off in 2 hours, discharge home later today  2.  Follow-up with  Dr. Arevalo in the clinic continue to optimize her medical regimen modify her cardiovascular risk factors          Osiel Suárez, DO  Interventional cardiology  [facility]  April 23, 2023

## 2023-04-24 ENCOUNTER — TRANSCRIBE ORDERS (OUTPATIENT)
Dept: ADMINISTRATIVE | Facility: HOSPITAL | Age: 73
End: 2023-04-24
Payer: MEDICARE

## 2023-04-24 DIAGNOSIS — R94.4 ABNORMAL RESULTS OF KIDNEY FUNCTION STUDIES: ICD-10-CM

## 2023-04-24 DIAGNOSIS — R06.02 SHORTNESS OF BREATH: Primary | ICD-10-CM

## 2023-04-24 DIAGNOSIS — D64.9 ANEMIA, UNSPECIFIED TYPE: ICD-10-CM

## 2023-04-26 ENCOUNTER — LAB (OUTPATIENT)
Dept: LAB | Facility: HOSPITAL | Age: 73
End: 2023-04-26
Payer: MEDICARE

## 2023-04-26 ENCOUNTER — HOSPITAL ENCOUNTER (OUTPATIENT)
Dept: PULMONOLOGY | Facility: HOSPITAL | Age: 73
Discharge: HOME | End: 2023-04-26
Payer: MEDICARE

## 2023-04-26 DIAGNOSIS — R06.02 SHORTNESS OF BREATH: ICD-10-CM

## 2023-04-26 DIAGNOSIS — D64.9 ANEMIA, UNSPECIFIED TYPE: ICD-10-CM

## 2023-04-26 DIAGNOSIS — R94.4 ABNORMAL RESULTS OF KIDNEY FUNCTION STUDIES: ICD-10-CM

## 2023-04-26 LAB
ALBUMIN SERPL-MCNC: 5 G/DL (ref 3.5–5.2)
ALBUMIN/GLOB SERPL: 1.7 G/DL
ALP SERPL-CCNC: 112 U/L (ref 39–117)
ALT SERPL W P-5'-P-CCNC: 24 U/L (ref 1–33)
ANION GAP SERPL CALCULATED.3IONS-SCNC: 13 MMOL/L (ref 5–15)
AST SERPL-CCNC: 25 U/L (ref 1–32)
BASOPHILS # BLD AUTO: 0.02 10*3/MM3 (ref 0–0.2)
BASOPHILS NFR BLD AUTO: 0.3 % (ref 0–1.5)
BILIRUB SERPL-MCNC: 0.3 MG/DL (ref 0–1.2)
BUN SERPL-MCNC: 26 MG/DL (ref 8–23)
BUN/CREAT SERPL: 19 (ref 7–25)
CALCIUM SPEC-SCNC: 10.4 MG/DL (ref 8.6–10.5)
CHLORIDE SERPL-SCNC: 101 MMOL/L (ref 98–107)
CO2 SERPL-SCNC: 24 MMOL/L (ref 22–29)
CREAT SERPL-MCNC: 1.37 MG/DL (ref 0.57–1)
DEPRECATED RDW RBC AUTO: 47.1 FL (ref 37–54)
EGFRCR SERPLBLD CKD-EPI 2021: 40.6 ML/MIN/1.73
EOSINOPHIL # BLD AUTO: 0.17 10*3/MM3 (ref 0–0.4)
EOSINOPHIL NFR BLD AUTO: 2.7 % (ref 0.3–6.2)
ERYTHROCYTE [DISTWIDTH] IN BLOOD BY AUTOMATED COUNT: 13.2 % (ref 12.3–15.4)
GLOBULIN UR ELPH-MCNC: 2.9 GM/DL
GLUCOSE SERPL-MCNC: 100 MG/DL (ref 65–99)
HCT VFR BLD AUTO: 38 % (ref 34–46.6)
HGB BLD-MCNC: 11.9 G/DL (ref 12–15.9)
IMM GRANULOCYTES # BLD AUTO: 0.02 10*3/MM3 (ref 0–0.05)
IMM GRANULOCYTES NFR BLD AUTO: 0.3 % (ref 0–0.5)
LYMPHOCYTES # BLD AUTO: 1.57 10*3/MM3 (ref 0.7–3.1)
LYMPHOCYTES NFR BLD AUTO: 24.9 % (ref 19.6–45.3)
MCH RBC QN AUTO: 30.7 PG (ref 26.6–33)
MCHC RBC AUTO-ENTMCNC: 31.3 G/DL (ref 31.5–35.7)
MCV RBC AUTO: 98.2 FL (ref 79–97)
MONOCYTES # BLD AUTO: 0.41 10*3/MM3 (ref 0.1–0.9)
MONOCYTES NFR BLD AUTO: 6.5 % (ref 5–12)
NEUTROPHILS NFR BLD AUTO: 4.12 10*3/MM3 (ref 1.7–7)
NEUTROPHILS NFR BLD AUTO: 65.3 % (ref 42.7–76)
NRBC BLD AUTO-RTO: 0 /100 WBC (ref 0–0.2)
PLATELET # BLD AUTO: 284 10*3/MM3 (ref 140–450)
PMV BLD AUTO: 11.8 FL (ref 6–12)
POTASSIUM SERPL-SCNC: 5.1 MMOL/L (ref 3.5–5.2)
PROT SERPL-MCNC: 7.9 G/DL (ref 6–8.5)
RBC # BLD AUTO: 3.87 10*6/MM3 (ref 3.77–5.28)
SODIUM SERPL-SCNC: 138 MMOL/L (ref 136–145)
WBC NRBC COR # BLD: 6.31 10*3/MM3 (ref 3.4–10.8)

## 2023-04-26 PROCEDURE — 85025 COMPLETE CBC W/AUTO DIFF WBC: CPT

## 2023-04-26 PROCEDURE — 94729 DIFFUSING CAPACITY: CPT | Performed by: INTERNAL MEDICINE

## 2023-04-26 PROCEDURE — 80053 COMPREHEN METABOLIC PANEL: CPT

## 2023-04-26 PROCEDURE — 94060 EVALUATION OF WHEEZING: CPT

## 2023-04-26 PROCEDURE — 94726 PLETHYSMOGRAPHY LUNG VOLUMES: CPT | Performed by: INTERNAL MEDICINE

## 2023-04-26 PROCEDURE — 94729 DIFFUSING CAPACITY: CPT

## 2023-04-26 PROCEDURE — 36415 COLL VENOUS BLD VENIPUNCTURE: CPT

## 2023-04-26 PROCEDURE — 94726 PLETHYSMOGRAPHY LUNG VOLUMES: CPT

## 2023-04-26 PROCEDURE — 94060 EVALUATION OF WHEEZING: CPT | Performed by: INTERNAL MEDICINE

## 2023-04-26 RX ORDER — ALBUTEROL SULFATE 2.5 MG/3ML
2.5 SOLUTION RESPIRATORY (INHALATION) ONCE
Status: COMPLETED | OUTPATIENT
Start: 2023-04-26 | End: 2023-04-26

## 2023-04-26 RX ADMIN — ALBUTEROL SULFATE 2.5 MG: 2.5 SOLUTION RESPIRATORY (INHALATION) at 11:07

## 2023-05-03 ENCOUNTER — LAB (OUTPATIENT)
Dept: LAB | Facility: HOSPITAL | Age: 73
End: 2023-05-03
Payer: MEDICARE

## 2023-05-03 ENCOUNTER — OFFICE VISIT (OUTPATIENT)
Dept: GASTROENTEROLOGY | Facility: CLINIC | Age: 73
End: 2023-05-03
Payer: MEDICARE

## 2023-05-03 VITALS
HEIGHT: 64 IN | WEIGHT: 162 LBS | BODY MASS INDEX: 27.66 KG/M2 | DIASTOLIC BLOOD PRESSURE: 72 MMHG | TEMPERATURE: 97.3 F | OXYGEN SATURATION: 96 % | SYSTOLIC BLOOD PRESSURE: 130 MMHG | HEART RATE: 86 BPM

## 2023-05-03 DIAGNOSIS — D50.9 IRON DEFICIENCY ANEMIA, UNSPECIFIED IRON DEFICIENCY ANEMIA TYPE: Primary | ICD-10-CM

## 2023-05-03 DIAGNOSIS — R13.19 ESOPHAGEAL DYSPHAGIA: ICD-10-CM

## 2023-05-03 DIAGNOSIS — Z78.9 NONSMOKER: ICD-10-CM

## 2023-05-03 DIAGNOSIS — K21.9 GASTROESOPHAGEAL REFLUX DISEASE, UNSPECIFIED WHETHER ESOPHAGITIS PRESENT: ICD-10-CM

## 2023-05-03 LAB
ANION GAP SERPL CALCULATED.3IONS-SCNC: 14 MMOL/L (ref 5–15)
BUN SERPL-MCNC: 20 MG/DL (ref 8–23)
BUN/CREAT SERPL: 19.8 (ref 7–25)
CALCIUM SPEC-SCNC: 10.2 MG/DL (ref 8.6–10.5)
CHLORIDE SERPL-SCNC: 99 MMOL/L (ref 98–107)
CO2 SERPL-SCNC: 24 MMOL/L (ref 22–29)
CREAT SERPL-MCNC: 1.01 MG/DL (ref 0.57–1)
EGFRCR SERPLBLD CKD-EPI 2021: 58.5 ML/MIN/1.73
FERRITIN SERPL-MCNC: 320.8 NG/ML (ref 13–150)
GLUCOSE SERPL-MCNC: 111 MG/DL (ref 65–99)
POTASSIUM SERPL-SCNC: 4.7 MMOL/L (ref 3.5–5.2)
SODIUM SERPL-SCNC: 137 MMOL/L (ref 136–145)

## 2023-05-03 PROCEDURE — 36415 COLL VENOUS BLD VENIPUNCTURE: CPT | Performed by: CLINICAL NURSE SPECIALIST

## 2023-05-03 PROCEDURE — 82728 ASSAY OF FERRITIN: CPT | Performed by: CLINICAL NURSE SPECIALIST

## 2023-05-03 PROCEDURE — 80048 BASIC METABOLIC PNL TOTAL CA: CPT | Performed by: CLINICAL NURSE SPECIALIST

## 2023-05-03 NOTE — H&P (VIEW-ONLY)
Liudmila Ga  1950    5/3/2023  Chief Complaint   Patient presents with   • GI Problem     Anemia     Subjective   HPI  Liudmila Ga is a 72 y.o. female who presents with a complaint of progressive fatigue and weakness that began last September/October and iron def anemia on most recent labs. inicdental finding. She says that she can hardly take a shower without getting short of breath.  She had a heart cath last week and that showed mild nonobstructive coronary artery disease, JASIEL, HTN, paroxysmal supraventricular tachycardia. She has had a persistent dry cough she says since COVID last year everything seemed to worsen after COVID she says. She has also been given an inhaler for suspected long COVID however workup is ongoing, she says that this has helped. No current chest pain or shortness of breath today.  Iron saturation low at 19% Iron 74, TIBC 396  H/H 10.8/35.3 4/21/23  H/H 11.9/38 4/26 Creatinine 1.37 BUN 26  No visible bleding at this time. She does have hemorrhoids she says. No change in bowels  She has reflux indigestion ongoing persistent for years. Stable with Protonix.   She has vomiting on occasion she says due to drainage.     Past Medical History:   Diagnosis Date   • Arthritis    • Colon polyps    • Disease of thyroid gland    • GERD (gastroesophageal reflux disease)    • Hiatal hernia    • Kidney stone    • Osteopenia    • Overactive bladder    • Vitamin D deficiency      Past Surgical History:   Procedure Laterality Date   • CARDIAC CATHETERIZATION N/A 04/23/2023    Procedure: Left Heart Cath;  Surgeon: Osiel Suárez DO;  Location:  [facility];  Service: Cardiovascular;  Laterality: N/A;   • CHOLECYSTECTOMY  2004   • COLONOSCOPY  08/20/2014    HYPERPLASTIC POLYP ILEOCECAL VALVE AND HEPATIC FLEXURE, DIVERTICULA IN SIGMOID COLON   • COLONOSCOPY  02/04/2014    TUBULAR ADENOMA CECAL POLYP, TUBULAR ADENOMA RECTAL POLYP, HYPERPLASTICS POLYPS AT  ILEOCECAL VALVE AND RECTAL.   • COLONOSCOPY N/A 12/11/2017    2 Sessile serrated adenomas large intestine and hepatic flexure, 2 tubular adenomas cecum and at 60 cm  repeat exam in 3 years   • COLONOSCOPY N/A 07/23/2020    Tics otherwise normal exam repeat in 3 years   • ENDOSCOPY  12/22/2015    HIATAL HERNIA, MILD SCHATZKI RING DILATED   • ENDOSCOPY N/A 11/07/2017    HH, dilated otherwise normal exam   • ENDOSCOPY N/A 08/19/2019    Small HH, NOn-obstructing Schatzki ring dilated, food residue in stomach   • ENDOSCOPY N/A 10/03/2019    Gastritis otherwise normal exam   • HYSTERECTOMY  06/2011    TOTAL   • TONSILLECTOMY         Outpatient Medications Marked as Taking for the 5/3/23 encounter (Office Visit) with Allegra Houser APRN   Medication Sig Dispense Refill   • atenolol (TENORMIN) 25 MG tablet Take 1 tablet by mouth Daily.     • Biotin 07449 MCG tablet Take  by mouth.     • bisacodyl (DULCOLAX) 5 MG EC tablet Take 1 tablet by mouth Daily As Needed for Constipation.     • Cholecalciferol (VITAMIN D3) 2000 units tablet Take  by mouth.     • dilTIAZem CD (CARDIZEM CD) 120 MG 24 hr capsule Take 1 capsule by mouth Daily.     • DULoxetine (CYMBALTA) 20 MG capsule Take 1 capsule by mouth Every 72 (Seventy-Two) Hours.     • ferrous sulfate 325 (65 FE) MG tablet Take 1 tablet by mouth Daily With Breakfast. 30 tablet 5   • fluticasone (FLONASE) 50 MCG/ACT nasal spray 2 sprays into the nostril(s) as directed by provider Daily.     • loratadine (CLARITIN) 10 MG tablet Take 1 tablet by mouth Daily.     • Lysine 500 MG capsule Take 500 mg by mouth.     • metoclopramide (REGLAN) 5 MG tablet Take 1 tablet by mouth 2 (Two) Times a Day.     • Omega-3 Fatty Acids (FISH OIL) 1200 MG capsule capsule Take 1 capsule by mouth Daily.     • pantoprazole (PROTONIX) 40 MG EC tablet Take 1 tablet by mouth Daily.     • potassium citrate (UROCIT-K) 10 MEQ (1080 MG) CR tablet 1 tablet 2 (Two) Times a Day.     • rosuvastatin (CRESTOR) 5  MG tablet TAKE 1 TABLET EVERY DAY 90 tablet 3   • Synthroid 50 MCG tablet Take 1 tablet by mouth Daily.     • vitamin C (ASCORBIC ACID) 500 MG tablet Take 2 tablets by mouth Daily.     • Zinc 50 MG capsule Take  by mouth.     • zolpidem (AMBIEN) 10 MG tablet Take 1 tablet by mouth Every Night.       Allergies   Allergen Reactions   • Lortab [Hydrocodone-Acetaminophen] Itching   • Percocet [Oxycodone-Acetaminophen] Itching   • Sulfa Antibiotics Unknown (See Comments)     unknown     Social History     Socioeconomic History   • Marital status:    Tobacco Use   • Smoking status: Former     Packs/day: 1.00     Years: 45.00     Pack years: 45.00     Types: Cigarettes     Quit date: 2012     Years since quittin.3   • Smokeless tobacco: Never   Vaping Use   • Vaping Use: Never used   Substance and Sexual Activity   • Alcohol use: Not Currently     Comment: Occasionally   • Drug use: Never   • Sexual activity: Not Currently     Partners: Male     Family History   Problem Relation Age of Onset   • Liver cancer Mother    • Colon cancer Brother    • Colon polyps Brother    • Breast cancer Neg Hx    • Ovarian cancer Neg Hx      Health Maintenance   Topic Date Due   • TDAP/TD VACCINES (1 - Tdap) Never done   • LUNG CANCER SCREENING  Never done   • ZOSTER VACCINE (2 of 3) 2009   • Pneumococcal Vaccine 65+ (1 - PCV) Never done   • HEPATITIS C SCREENING  Never done   • ANNUAL WELLNESS VISIT  Never done   • DXA SCAN  2018   • MAMMOGRAM  2020   • COVID-19 Vaccine (3 - Booster for Moderna series) 2021   • LIPID PANEL  Never done   • COLORECTAL CANCER SCREENING  2023   • INFLUENZA VACCINE  2023     Review of Systems   Constitutional: Positive for activity change and fatigue. Negative for appetite change, chills, diaphoresis, fever and unexpected weight change.   HENT: Negative for ear pain, hearing loss, mouth sores, sore throat, trouble swallowing and voice change.    Eyes: Negative.  "   Respiratory: Negative for cough, choking, shortness of breath and wheezing.    Cardiovascular: Negative for chest pain and palpitations.   Gastrointestinal: Negative for abdominal pain, blood in stool, constipation, diarrhea, nausea and vomiting.   Endocrine: Negative for cold intolerance and heat intolerance.   Genitourinary: Negative for decreased urine volume, dysuria, frequency, hematuria and urgency.   Musculoskeletal: Positive for back pain. Negative for gait problem and myalgias.   Skin: Negative for color change, pallor and rash.   Allergic/Immunologic: Negative for food allergies and immunocompromised state.   Neurological: Positive for weakness. Negative for dizziness, tremors, seizures, syncope, light-headedness, numbness and headaches.   Hematological: Negative for adenopathy. Does not bruise/bleed easily.   Psychiatric/Behavioral: Negative for agitation and confusion. The patient is not nervous/anxious.    All other systems reviewed and are negative.    Objective   Vitals:    05/03/23 0901   BP: 130/72   Pulse: 86   Temp: 97.3 °F (36.3 °C)   TempSrc: Temporal   SpO2: 96%   Weight: 73.5 kg (162 lb)   Height: 162.6 cm (64\")     Body mass index is 27.81 kg/m².  Physical Exam  Constitutional:       Appearance: She is well-developed.   HENT:      Head: Normocephalic and atraumatic.   Eyes:      Pupils: Pupils are equal, round, and reactive to light.   Neck:      Trachea: No tracheal deviation.   Cardiovascular:      Rate and Rhythm: Normal rate and regular rhythm.      Heart sounds: Normal heart sounds. No murmur heard.    No friction rub. No gallop.   Pulmonary:      Effort: Pulmonary effort is normal. No respiratory distress.      Breath sounds: Normal breath sounds. No wheezing or rales.   Chest:      Chest wall: No tenderness.   Abdominal:      General: Bowel sounds are normal. There is no distension.      Palpations: Abdomen is soft. Abdomen is not rigid.      Tenderness: There is no abdominal " tenderness. There is no guarding or rebound.   Musculoskeletal:         General: No tenderness or deformity. Normal range of motion.      Cervical back: Normal range of motion and neck supple.   Skin:     General: Skin is warm and dry.      Coloration: Skin is not pale.      Findings: No rash.   Neurological:      Mental Status: She is alert and oriented to person, place, and time.      Deep Tendon Reflexes: Reflexes are normal and symmetric.   Psychiatric:         Behavior: Behavior normal.         Thought Content: Thought content normal.         Judgment: Judgment normal.       Assessment & Plan   Diagnoses and all orders for this visit:    1. Iron deficiency anemia, unspecified iron deficiency anemia type (Primary)  -     Case Request; Standing  -     Follow Anesthesia Guidelines / Protocol; Future  -     Obtain Informed Consent; Future  -     Implement Anesthesia Orders Day of Procedure; Standing  -     Obtain Informed Consent; Standing  -     Verify Bowel Prep Was Successful; Standing  -     Case Request  -     Basic Metabolic Panel  -     Ferritin  -     polyethylene glycol (GoLYTELY) 236 g solution; Take as directed by office instructions.  Dispense: 4000 mL; Refill: 0    2. Gastroesophageal reflux disease, unspecified whether esophagitis present    3. Esophageal dysphagia    4. Nonsmoker    will get Endo/colon to further evlauate  JASIEL due to contrast from her CT study for cardio will await on CT enterography due to this. Will recheck today.   Also will get Ferritin  No signs for bleeding  Recent heart cath reviewed    ESOPHAGOGASTRODUODENOSCOPY WITH ANESTHESIA (N/A), COLONOSCOPY WITH ANESTHESIA (N/A)  Part of this note may be an electronic transcription/translation of spoken language to printed text using the Dragon Dictation System.  Body mass index is 27.81 kg/m².  No follow-ups on file.    BMI is >= 25 and <30. (Overweight) The following options were offered after discussion;: weight loss educational  material (shared in after visit summary)      All risks, benefits, alternatives, and indications of colonoscopy and/or Endoscopy procedure have been discussed with the patient. Risks to include perforation of the colon requiring possible surgery or colostomy, risk of bleeding from biopsies or removal of colon tissue, possibility of missing a colon polyp or cancer, or adverse drug reaction.  Benefits to include the diagnosis and management of disease of the colon and rectum. Alternatives to include barium enema, radiographic evaluation, lab testing or no intervention. Pt verbalizes understanding and agrees.     Allegra Houser, APRN  5/3/2023  09:36 CDT          If you smoke or use tobacco, 4 minutes reading provided  Steps to Quit Smoking  Smoking tobacco can be harmful to your health and can affect almost every organ in your body. Smoking puts you, and those around you, at risk for developing many serious chronic diseases. Quitting smoking is difficult, but it is one of the best things that you can do for your health. It is never too late to quit.  What are the benefits of quitting smoking?  When you quit smoking, you lower your risk of developing serious diseases and conditions, such as:  · Lung cancer or lung disease, such as COPD.  · Heart disease.  · Stroke.  · Heart attack.  · Infertility.  · Osteoporosis and bone fractures.  Additionally, symptoms such as coughing, wheezing, and shortness of breath may get better when you quit. You may also find that you get sick less often because your body is stronger at fighting off colds and infections. If you are pregnant, quitting smoking can help to reduce your chances of having a baby of low birth weight.  How do I get ready to quit?  When you decide to quit smoking, create a plan to make sure that you are successful. Before you quit:  · Pick a date to quit. Set a date within the next two weeks to give you time to prepare.  · Write down the reasons why you are  quitting. Keep this list in places where you will see it often, such as on your bathroom mirror or in your car or wallet.  · Identify the people, places, things, and activities that make you want to smoke (triggers) and avoid them. Make sure to take these actions:  ¨ Throw away all cigarettes at home, at work, and in your car.  ¨ Throw away smoking accessories, such as ashtrays and lighters.  ¨ Clean your car and make sure to empty the ashtray.  ¨ Clean your home, including curtains and carpets.  · Tell your family, friends, and coworkers that you are quitting. Support from your loved ones can make quitting easier.  · Talk with your health care provider about your options for quitting smoking.  · Find out what treatment options are covered by your health insurance.  What strategies can I use to quit smoking?  Talk with your healthcare provider about different strategies to quit smoking. Some strategies include:  · Quitting smoking altogether instead of gradually lessening how much you smoke over a period of time. Research shows that quitting “cold turkey” is more successful than gradually quitting.  · Attending in-person counseling to help you build problem-solving skills. You are more likely to have success in quitting if you attend several counseling sessions. Even short sessions of 10 minutes can be effective.  · Finding resources and support systems that can help you to quit smoking and remain smoke-free after you quit. These resources are most helpful when you use them often. They can include:  ¨ Online chats with a counselor.  ¨ Telephone quitlines.  ¨ Printed self-help materials.  ¨ Support groups or group counseling.  ¨ Text messaging programs.  ¨ Mobile phone applications.  · Taking medicines to help you quit smoking. (If you are pregnant or breastfeeding, talk with your health care provider first.) Some medicines contain nicotine and some do not. Both types of medicines help with cravings, but the  medicines that include nicotine help to relieve withdrawal symptoms. Your health care provider may recommend:  ¨ Nicotine patches, gum, or lozenges.  ¨ Nicotine inhalers or sprays.  ¨ Non-nicotine medicine that is taken by mouth.  Talk with your health care provider about combining strategies, such as taking medicines while you are also receiving in-person counseling. Using these two strategies together makes you more likely to succeed in quitting than if you used either strategy on its own.  If you are pregnant or breastfeeding, talk with your health care provider about finding counseling or other support strategies to quit smoking. Do not take medicine to help you quit smoking unless told to do so by your health care provider.  What things can I do to make it easier to quit?  Quitting smoking might feel overwhelming at first, but there is a lot that you can do to make it easier. Take these important actions:  · Reach out to your family and friends and ask that they support and encourage you during this time. Call telephone quitlines, reach out to support groups, or work with a counselor for support.  · Ask people who smoke to avoid smoking around you.  · Avoid places that trigger you to smoke, such as bars, parties, or smoke-break areas at work.  · Spend time around people who do not smoke.  · Lessen stress in your life, because stress can be a smoking trigger for some people. To lessen stress, try:  ¨ Exercising regularly.  ¨ Deep-breathing exercises.  ¨ Yoga.  ¨ Meditating.  ¨ Performing a body scan. This involves closing your eyes, scanning your body from head to toe, and noticing which parts of your body are particularly tense. Purposefully relax the muscles in those areas.  · Download or purchase mobile phone or tablet apps (applications) that can help you stick to your quit plan by providing reminders, tips, and encouragement. There are many free apps, such as QuitGuide from the CDC (Centers for Disease  Control and Prevention). You can find other support for quitting smoking (smoking cessation) through smokefree.gov and other websites.  How will I feel when I quit smoking?  Within the first 24 hours of quitting smoking, you may start to feel some withdrawal symptoms. These symptoms are usually most noticeable 2-3 days after quitting, but they usually do not last beyond 2-3 weeks. Changes or symptoms that you might experience include:  · Mood swings.  · Restlessness, anxiety, or irritation.  · Difficulty concentrating.  · Dizziness.  · Strong cravings for sugary foods in addition to nicotine.  · Mild weight gain.  · Constipation.  · Nausea.  · Coughing or a sore throat.  · Changes in how your medicines work in your body.  · A depressed mood.  · Difficulty sleeping (insomnia).  After the first 2-3 weeks of quitting, you may start to notice more positive results, such as:  · Improved sense of smell and taste.  · Decreased coughing and sore throat.  · Slower heart rate.  · Lower blood pressure.  · Clearer skin.  · The ability to breathe more easily.  · Fewer sick days.  Quitting smoking is very challenging for most people. Do not get discouraged if you are not successful the first time. Some people need to make many attempts to quit before they achieve long-term success. Do your best to stick to your quit plan, and talk with your health care provider if you have any questions or concerns.  This information is not intended to replace advice given to you by your health care provider. Make sure you discuss any questions you have with your health care provider.  Document Released: 12/12/2002 Document Revised: 08/15/2017 Document Reviewed: 05/03/2016  [employer] Interactive Patient Education © 2017 [employer]

## 2023-05-03 NOTE — PROGRESS NOTES
Liudmila Ga  1950    5/3/2023  Chief Complaint   Patient presents with   • GI Problem     Anemia     Subjective   HPI  Liudmila Ga is a 72 y.o. female who presents with a complaint of progressive fatigue and weakness that began last September/October and iron def anemia on most recent labs. inicdental finding. She says that she can hardly take a shower without getting short of breath.  She had a heart cath last week and that showed mild nonobstructive coronary artery disease, JASIEL, HTN, paroxysmal supraventricular tachycardia. She has had a persistent dry cough she says since COVID last year everything seemed to worsen after COVID she says. She has also been given an inhaler for suspected long COVID however workup is ongoing, she says that this has helped. No current chest pain or shortness of breath today.  Iron saturation low at 19% Iron 74, TIBC 396  H/H 10.8/35.3 4/21/23  H/H 11.9/38 4/26 Creatinine 1.37 BUN 26  No visible bleding at this time. She does have hemorrhoids she says. No change in bowels  She has reflux indigestion ongoing persistent for years. Stable with Protonix.   She has vomiting on occasion she says due to drainage.     Past Medical History:   Diagnosis Date   • Arthritis    • Colon polyps    • Disease of thyroid gland    • GERD (gastroesophageal reflux disease)    • Hiatal hernia    • Kidney stone    • Osteopenia    • Overactive bladder    • Vitamin D deficiency      Past Surgical History:   Procedure Laterality Date   • CARDIAC CATHETERIZATION N/A 04/23/2023    Procedure: Left Heart Cath;  Surgeon: Osiel Suárez DO;  Location:  [facility];  Service: Cardiovascular;  Laterality: N/A;   • CHOLECYSTECTOMY  2004   • COLONOSCOPY  08/20/2014    HYPERPLASTIC POLYP ILEOCECAL VALVE AND HEPATIC FLEXURE, DIVERTICULA IN SIGMOID COLON   • COLONOSCOPY  02/04/2014    TUBULAR ADENOMA CECAL POLYP, TUBULAR ADENOMA RECTAL POLYP, HYPERPLASTICS POLYPS AT  ILEOCECAL VALVE AND RECTAL.   • COLONOSCOPY N/A 12/11/2017    2 Sessile serrated adenomas large intestine and hepatic flexure, 2 tubular adenomas cecum and at 60 cm  repeat exam in 3 years   • COLONOSCOPY N/A 07/23/2020    Tics otherwise normal exam repeat in 3 years   • ENDOSCOPY  12/22/2015    HIATAL HERNIA, MILD SCHATZKI RING DILATED   • ENDOSCOPY N/A 11/07/2017    HH, dilated otherwise normal exam   • ENDOSCOPY N/A 08/19/2019    Small HH, NOn-obstructing Schatzki ring dilated, food residue in stomach   • ENDOSCOPY N/A 10/03/2019    Gastritis otherwise normal exam   • HYSTERECTOMY  06/2011    TOTAL   • TONSILLECTOMY         Outpatient Medications Marked as Taking for the 5/3/23 encounter (Office Visit) with Allegra Houser APRN   Medication Sig Dispense Refill   • atenolol (TENORMIN) 25 MG tablet Take 1 tablet by mouth Daily.     • Biotin 08368 MCG tablet Take  by mouth.     • bisacodyl (DULCOLAX) 5 MG EC tablet Take 1 tablet by mouth Daily As Needed for Constipation.     • Cholecalciferol (VITAMIN D3) 2000 units tablet Take  by mouth.     • dilTIAZem CD (CARDIZEM CD) 120 MG 24 hr capsule Take 1 capsule by mouth Daily.     • DULoxetine (CYMBALTA) 20 MG capsule Take 1 capsule by mouth Every 72 (Seventy-Two) Hours.     • ferrous sulfate 325 (65 FE) MG tablet Take 1 tablet by mouth Daily With Breakfast. 30 tablet 5   • fluticasone (FLONASE) 50 MCG/ACT nasal spray 2 sprays into the nostril(s) as directed by provider Daily.     • loratadine (CLARITIN) 10 MG tablet Take 1 tablet by mouth Daily.     • Lysine 500 MG capsule Take 500 mg by mouth.     • metoclopramide (REGLAN) 5 MG tablet Take 1 tablet by mouth 2 (Two) Times a Day.     • Omega-3 Fatty Acids (FISH OIL) 1200 MG capsule capsule Take 1 capsule by mouth Daily.     • pantoprazole (PROTONIX) 40 MG EC tablet Take 1 tablet by mouth Daily.     • potassium citrate (UROCIT-K) 10 MEQ (1080 MG) CR tablet 1 tablet 2 (Two) Times a Day.     • rosuvastatin (CRESTOR) 5  MG tablet TAKE 1 TABLET EVERY DAY 90 tablet 3   • Synthroid 50 MCG tablet Take 1 tablet by mouth Daily.     • vitamin C (ASCORBIC ACID) 500 MG tablet Take 2 tablets by mouth Daily.     • Zinc 50 MG capsule Take  by mouth.     • zolpidem (AMBIEN) 10 MG tablet Take 1 tablet by mouth Every Night.       Allergies   Allergen Reactions   • Lortab [Hydrocodone-Acetaminophen] Itching   • Percocet [Oxycodone-Acetaminophen] Itching   • Sulfa Antibiotics Unknown (See Comments)     unknown     Social History     Socioeconomic History   • Marital status:    Tobacco Use   • Smoking status: Former     Packs/day: 1.00     Years: 45.00     Pack years: 45.00     Types: Cigarettes     Quit date: 2012     Years since quittin.3   • Smokeless tobacco: Never   Vaping Use   • Vaping Use: Never used   Substance and Sexual Activity   • Alcohol use: Not Currently     Comment: Occasionally   • Drug use: Never   • Sexual activity: Not Currently     Partners: Male     Family History   Problem Relation Age of Onset   • Liver cancer Mother    • Colon cancer Brother    • Colon polyps Brother    • Breast cancer Neg Hx    • Ovarian cancer Neg Hx      Health Maintenance   Topic Date Due   • TDAP/TD VACCINES (1 - Tdap) Never done   • LUNG CANCER SCREENING  Never done   • ZOSTER VACCINE (2 of 3) 2009   • Pneumococcal Vaccine 65+ (1 - PCV) Never done   • HEPATITIS C SCREENING  Never done   • ANNUAL WELLNESS VISIT  Never done   • DXA SCAN  2018   • MAMMOGRAM  2020   • COVID-19 Vaccine (3 - Booster for Moderna series) 2021   • LIPID PANEL  Never done   • COLORECTAL CANCER SCREENING  2023   • INFLUENZA VACCINE  2023     Review of Systems   Constitutional: Positive for activity change and fatigue. Negative for appetite change, chills, diaphoresis, fever and unexpected weight change.   HENT: Negative for ear pain, hearing loss, mouth sores, sore throat, trouble swallowing and voice change.    Eyes: Negative.  "   Respiratory: Negative for cough, choking, shortness of breath and wheezing.    Cardiovascular: Negative for chest pain and palpitations.   Gastrointestinal: Negative for abdominal pain, blood in stool, constipation, diarrhea, nausea and vomiting.   Endocrine: Negative for cold intolerance and heat intolerance.   Genitourinary: Negative for decreased urine volume, dysuria, frequency, hematuria and urgency.   Musculoskeletal: Positive for back pain. Negative for gait problem and myalgias.   Skin: Negative for color change, pallor and rash.   Allergic/Immunologic: Negative for food allergies and immunocompromised state.   Neurological: Positive for weakness. Negative for dizziness, tremors, seizures, syncope, light-headedness, numbness and headaches.   Hematological: Negative for adenopathy. Does not bruise/bleed easily.   Psychiatric/Behavioral: Negative for agitation and confusion. The patient is not nervous/anxious.    All other systems reviewed and are negative.    Objective   Vitals:    05/03/23 0901   BP: 130/72   Pulse: 86   Temp: 97.3 °F (36.3 °C)   TempSrc: Temporal   SpO2: 96%   Weight: 73.5 kg (162 lb)   Height: 162.6 cm (64\")     Body mass index is 27.81 kg/m².  Physical Exam  Constitutional:       Appearance: She is well-developed.   HENT:      Head: Normocephalic and atraumatic.   Eyes:      Pupils: Pupils are equal, round, and reactive to light.   Neck:      Trachea: No tracheal deviation.   Cardiovascular:      Rate and Rhythm: Normal rate and regular rhythm.      Heart sounds: Normal heart sounds. No murmur heard.    No friction rub. No gallop.   Pulmonary:      Effort: Pulmonary effort is normal. No respiratory distress.      Breath sounds: Normal breath sounds. No wheezing or rales.   Chest:      Chest wall: No tenderness.   Abdominal:      General: Bowel sounds are normal. There is no distension.      Palpations: Abdomen is soft. Abdomen is not rigid.      Tenderness: There is no abdominal " tenderness. There is no guarding or rebound.   Musculoskeletal:         General: No tenderness or deformity. Normal range of motion.      Cervical back: Normal range of motion and neck supple.   Skin:     General: Skin is warm and dry.      Coloration: Skin is not pale.      Findings: No rash.   Neurological:      Mental Status: She is alert and oriented to person, place, and time.      Deep Tendon Reflexes: Reflexes are normal and symmetric.   Psychiatric:         Behavior: Behavior normal.         Thought Content: Thought content normal.         Judgment: Judgment normal.       Assessment & Plan   Diagnoses and all orders for this visit:    1. Iron deficiency anemia, unspecified iron deficiency anemia type (Primary)  -     Case Request; Standing  -     Follow Anesthesia Guidelines / Protocol; Future  -     Obtain Informed Consent; Future  -     Implement Anesthesia Orders Day of Procedure; Standing  -     Obtain Informed Consent; Standing  -     Verify Bowel Prep Was Successful; Standing  -     Case Request  -     Basic Metabolic Panel  -     Ferritin  -     polyethylene glycol (GoLYTELY) 236 g solution; Take as directed by office instructions.  Dispense: 4000 mL; Refill: 0    2. Gastroesophageal reflux disease, unspecified whether esophagitis present    3. Esophageal dysphagia    4. Nonsmoker    will get Endo/colon to further evlauate  JASIEL due to contrast from her CT study for cardio will await on CT enterography due to this. Will recheck today.   Also will get Ferritin  No signs for bleeding  Recent heart cath reviewed    ESOPHAGOGASTRODUODENOSCOPY WITH ANESTHESIA (N/A), COLONOSCOPY WITH ANESTHESIA (N/A)  Part of this note may be an electronic transcription/translation of spoken language to printed text using the Dragon Dictation System.  Body mass index is 27.81 kg/m².  No follow-ups on file.    BMI is >= 25 and <30. (Overweight) The following options were offered after discussion;: weight loss educational  material (shared in after visit summary)      All risks, benefits, alternatives, and indications of colonoscopy and/or Endoscopy procedure have been discussed with the patient. Risks to include perforation of the colon requiring possible surgery or colostomy, risk of bleeding from biopsies or removal of colon tissue, possibility of missing a colon polyp or cancer, or adverse drug reaction.  Benefits to include the diagnosis and management of disease of the colon and rectum. Alternatives to include barium enema, radiographic evaluation, lab testing or no intervention. Pt verbalizes understanding and agrees.     Allegra Houser, APRN  5/3/2023  09:36 CDT          If you smoke or use tobacco, 4 minutes reading provided  Steps to Quit Smoking  Smoking tobacco can be harmful to your health and can affect almost every organ in your body. Smoking puts you, and those around you, at risk for developing many serious chronic diseases. Quitting smoking is difficult, but it is one of the best things that you can do for your health. It is never too late to quit.  What are the benefits of quitting smoking?  When you quit smoking, you lower your risk of developing serious diseases and conditions, such as:  · Lung cancer or lung disease, such as COPD.  · Heart disease.  · Stroke.  · Heart attack.  · Infertility.  · Osteoporosis and bone fractures.  Additionally, symptoms such as coughing, wheezing, and shortness of breath may get better when you quit. You may also find that you get sick less often because your body is stronger at fighting off colds and infections. If you are pregnant, quitting smoking can help to reduce your chances of having a baby of low birth weight.  How do I get ready to quit?  When you decide to quit smoking, create a plan to make sure that you are successful. Before you quit:  · Pick a date to quit. Set a date within the next two weeks to give you time to prepare.  · Write down the reasons why you are  quitting. Keep this list in places where you will see it often, such as on your bathroom mirror or in your car or wallet.  · Identify the people, places, things, and activities that make you want to smoke (triggers) and avoid them. Make sure to take these actions:  ¨ Throw away all cigarettes at home, at work, and in your car.  ¨ Throw away smoking accessories, such as ashtrays and lighters.  ¨ Clean your car and make sure to empty the ashtray.  ¨ Clean your home, including curtains and carpets.  · Tell your family, friends, and coworkers that you are quitting. Support from your loved ones can make quitting easier.  · Talk with your health care provider about your options for quitting smoking.  · Find out what treatment options are covered by your health insurance.  What strategies can I use to quit smoking?  Talk with your healthcare provider about different strategies to quit smoking. Some strategies include:  · Quitting smoking altogether instead of gradually lessening how much you smoke over a period of time. Research shows that quitting “cold turkey” is more successful than gradually quitting.  · Attending in-person counseling to help you build problem-solving skills. You are more likely to have success in quitting if you attend several counseling sessions. Even short sessions of 10 minutes can be effective.  · Finding resources and support systems that can help you to quit smoking and remain smoke-free after you quit. These resources are most helpful when you use them often. They can include:  ¨ Online chats with a counselor.  ¨ Telephone quitlines.  ¨ Printed self-help materials.  ¨ Support groups or group counseling.  ¨ Text messaging programs.  ¨ Mobile phone applications.  · Taking medicines to help you quit smoking. (If you are pregnant or breastfeeding, talk with your health care provider first.) Some medicines contain nicotine and some do not. Both types of medicines help with cravings, but the  medicines that include nicotine help to relieve withdrawal symptoms. Your health care provider may recommend:  ¨ Nicotine patches, gum, or lozenges.  ¨ Nicotine inhalers or sprays.  ¨ Non-nicotine medicine that is taken by mouth.  Talk with your health care provider about combining strategies, such as taking medicines while you are also receiving in-person counseling. Using these two strategies together makes you more likely to succeed in quitting than if you used either strategy on its own.  If you are pregnant or breastfeeding, talk with your health care provider about finding counseling or other support strategies to quit smoking. Do not take medicine to help you quit smoking unless told to do so by your health care provider.  What things can I do to make it easier to quit?  Quitting smoking might feel overwhelming at first, but there is a lot that you can do to make it easier. Take these important actions:  · Reach out to your family and friends and ask that they support and encourage you during this time. Call telephone quitlines, reach out to support groups, or work with a counselor for support.  · Ask people who smoke to avoid smoking around you.  · Avoid places that trigger you to smoke, such as bars, parties, or smoke-break areas at work.  · Spend time around people who do not smoke.  · Lessen stress in your life, because stress can be a smoking trigger for some people. To lessen stress, try:  ¨ Exercising regularly.  ¨ Deep-breathing exercises.  ¨ Yoga.  ¨ Meditating.  ¨ Performing a body scan. This involves closing your eyes, scanning your body from head to toe, and noticing which parts of your body are particularly tense. Purposefully relax the muscles in those areas.  · Download or purchase mobile phone or tablet apps (applications) that can help you stick to your quit plan by providing reminders, tips, and encouragement. There are many free apps, such as QuitGuide from the CDC (Centers for Disease  Control and Prevention). You can find other support for quitting smoking (smoking cessation) through smokefree.gov and other websites.  How will I feel when I quit smoking?  Within the first 24 hours of quitting smoking, you may start to feel some withdrawal symptoms. These symptoms are usually most noticeable 2-3 days after quitting, but they usually do not last beyond 2-3 weeks. Changes or symptoms that you might experience include:  · Mood swings.  · Restlessness, anxiety, or irritation.  · Difficulty concentrating.  · Dizziness.  · Strong cravings for sugary foods in addition to nicotine.  · Mild weight gain.  · Constipation.  · Nausea.  · Coughing or a sore throat.  · Changes in how your medicines work in your body.  · A depressed mood.  · Difficulty sleeping (insomnia).  After the first 2-3 weeks of quitting, you may start to notice more positive results, such as:  · Improved sense of smell and taste.  · Decreased coughing and sore throat.  · Slower heart rate.  · Lower blood pressure.  · Clearer skin.  · The ability to breathe more easily.  · Fewer sick days.  Quitting smoking is very challenging for most people. Do not get discouraged if you are not successful the first time. Some people need to make many attempts to quit before they achieve long-term success. Do your best to stick to your quit plan, and talk with your health care provider if you have any questions or concerns.  This information is not intended to replace advice given to you by your health care provider. Make sure you discuss any questions you have with your health care provider.  Document Released: 12/12/2002 Document Revised: 08/15/2017 Document Reviewed: 05/03/2016  [employer] Interactive Patient Education © 2017 [employer]

## 2023-05-04 PROBLEM — D50.9 IRON DEFICIENCY ANEMIA: Status: ACTIVE | Noted: 2023-05-04

## 2023-05-12 ENCOUNTER — OFFICE VISIT (OUTPATIENT)
Dept: PULMONOLOGY | Facility: CLINIC | Age: 73
End: 2023-05-12
Payer: MEDICARE

## 2023-05-12 VITALS
OXYGEN SATURATION: 98 % | HEART RATE: 64 BPM | BODY MASS INDEX: 27.49 KG/M2 | WEIGHT: 161 LBS | SYSTOLIC BLOOD PRESSURE: 132 MMHG | DIASTOLIC BLOOD PRESSURE: 76 MMHG | HEIGHT: 64 IN

## 2023-05-12 DIAGNOSIS — M19.90 ARTHRITIS: ICD-10-CM

## 2023-05-12 DIAGNOSIS — R06.02 SHORTNESS OF BREATH: Primary | ICD-10-CM

## 2023-05-12 DIAGNOSIS — I73.00 RAYNAUD'S DISEASE WITHOUT GANGRENE: ICD-10-CM

## 2023-05-12 DIAGNOSIS — J30.2 OTHER SEASONAL ALLERGIC RHINITIS: ICD-10-CM

## 2023-05-12 DIAGNOSIS — E03.9 HYPOTHYROIDISM (ACQUIRED): ICD-10-CM

## 2023-05-12 DIAGNOSIS — K21.9 GASTROESOPHAGEAL REFLUX DISEASE WITHOUT ESOPHAGITIS: ICD-10-CM

## 2023-05-12 DIAGNOSIS — J44.9 OBSTRUCTIVE LUNG DISEASE: ICD-10-CM

## 2023-05-12 DIAGNOSIS — Z87.891 FORMER SMOKER: ICD-10-CM

## 2023-05-12 DIAGNOSIS — U09.9 LONG COVID: ICD-10-CM

## 2023-05-12 RX ORDER — FLUTICASONE FUROATE, UMECLIDINIUM BROMIDE AND VILANTEROL TRIFENATATE 100; 62.5; 25 UG/1; UG/1; UG/1
1 POWDER RESPIRATORY (INHALATION)
Qty: 2 EACH | Refills: 0 | COMMUNITY
Start: 2023-05-12

## 2023-05-12 NOTE — PROGRESS NOTES
ALTHEA Fernandez  [facility]   Pulmonary and Critical Care  [address] [place] [address]  Phone: [phone]  Fax: [phone]           Chief Complaint  Shortness of Breath (Pt experiences sob when doing small tasks. Pt states she's had problems since she had covid 09/2022. Recent PFT), Cough (Pt occasionally experiences cough, states it may be allergies.), and Wheezing (Pt occasionally experiences wheezing when lying down in bed. )    Subjective    History of Present Illness     Liudmila Ga presents to [facility] PULMONARY & CRITICAL CARE MEDICINE   History of Present Illness  Ms. Ga is a pleasant 72 year old female patient referred to our office for shortness of breath, post covid by her PCP. She has known history of GERD, Depression, arthritis, Hypothyroid, iron deficiency anemia, Raynaud disease, seasonal allergies and vitamin D deficiency. She has family history of liver cancer and food intolerance with mother, father with MI, brother with colon cancer. She is a former of smoker of 1 PPD x 45 years quitting in Jan 2012. She has arthritic pain of the right knee, left wrist and left shoulder. She has never been diagnosed with rheumatoid. She had covid twice. The first time was in Jan-Feb 2021. Second time was in Sept 2022. She has not been well since the last case of Covid. She has had back pain located between her shoulder blades since that time. She does not feel it is muscle in nature. She does note she has to change positions to get comfortable. She has significant fatigue. She is unable to even take a shower with having to rest. She gets very short of breath. She will have to sit down and take deep breaths. She states in Oct she was in [place] and was not getting better. She was seen locally and provided antibiotics, steroids, and Albuterol HFA. She has not used the albuterol since then. She has known hypothyroid. She is due to have  "her complete physical in July. She does note the last time her TSH was checked she had to change her medication dose and she had more energy following that. Her PFT in April showed moderate obstruction with significant improvement post bronchodilator, lung volumes with hyperinflation and normal diffusion capacity when corrected for alveolar volume. She had a 13% improvement post bronchodilator in FVC and 18% in FEV1. CXR in Oct reports no acute process. She had a CTA coronary in March. She had normal pulmonary arteries and veins. She then underwent a heart cath on 4/23/2023 with mild nonobstructive CAD and paroxysmal SVT. She was seen by GI on 5/3/23 for her Iron def anemia, GERD, Esophageal dysphagia. She has plans for Endoscopy/ Colonoscopy. Ferritin level was elevated. CBC with normal absolute eosinophils.     Objective   Vital Signs:   /76 (BP Location: Left arm, Patient Position: Sitting, Cuff Size: Adult)   Pulse 64   Ht 162.6 cm (64\")   Wt 73 kg (161 lb)   SpO2 98%   BMI 27.64 kg/m²     Physical Exam  Vitals reviewed.   Constitutional:       Appearance: Normal appearance.   Cardiovascular:      Rate and Rhythm: Normal rate and regular rhythm.   Pulmonary:      Effort: Pulmonary effort is normal.      Breath sounds: Normal breath sounds.   Neurological:      General: No focal deficit present.      Mental Status: She is alert and oriented to person, place, and time.   Psychiatric:         Mood and Affect: Mood normal.         Behavior: Behavior normal.          Result Review :  The following data was reviewed by: ALTHEA Fernandez on 05/12/2023:    Data reviewed: Radiologic studies CXR and Cardiac CTA   My interpretation of imaging:  As in HPI  My interpretation of labs: none   XR Chest 2 View (10/08/2022 12:34 EDT)  CT Angiogram Coronary (03/31/2023 15:12)      My interpretation of the PFT: as in HPI    Results for orders placed during the hospital encounter of 04/26/23    Full Pulmonary " Function Test With Bronchodilator    [facility] - Pulmonary Function Test    [address]  [place]  [address]  [phone]    Patient : Liudmila Ga  MRN : [id number]  CSN : [id number]  Pulmonologist : Dany Ferreira MD  Date : 4/26/2023    ______________________________________________________________________    Interpretation :  1.  Spirometry is consistent with a moderate obstructive ventilatory defect.  2.  There is significant improvement in spirometry postbronchodilator but a moderate obstructive ventilatory defect is still present.  3.  Lung volumes reveal a decrease in vital capacity secondary obstruction and hyperinflation is also present.  There is also a decrease in inspiratory capacity.  4.  There is a mild diffusion impairment which when corrected for alveolar volume is normalized.      Dany Ferreira MD          Assessment and Plan   Diagnoses and all orders for this visit:    1. Shortness of breath (Primary)  -     MARYANN With / DsDNA, RNP, Sjogrens A / B, Bashir  -     ANCA Panel  -     IgE + Allergens (22)  -     CBC & Differential  -     CT Chest Hi Resolution Diagnostic; Future  -     Rheumatoid Factor  -     TSH    2. Raynaud's disease without gangrene  -     MARYANN With / DsDNA, RNP, Sjogrens A / B, Bashir  -     ANCA Panel  -     IgE + Allergens (22)  -     CBC & Differential  -     CT Chest Hi Resolution Diagnostic; Future  -     Rheumatoid Factor  -     TSH    3. Obstructive lung disease  Comments:  Could be asthma given the significant post bronchodilator response   Orders:  -     MARYANN With / DsDNA, RNP, Sjogrens A / B, Bashir  -     ANCA Panel  -     IgE + Allergens (22)  -     CBC & Differential  -     CT Chest Hi Resolution Diagnostic; Future  -     Rheumatoid Factor  -     TSH    4. Long COVID  -     MARYANN With / DsDNA, RNP, Sjogrens A / B, Bashir  -     ANCA Panel  -     IgE + Allergens (22)  -     CBC & Differential  -     CT Chest Hi Resolution  Diagnostic; Future  -     Rheumatoid Factor  -     TSH    5. Other seasonal allergic rhinitis  -     IgE + Allergens (22)    6. Gastroesophageal reflux disease without esophagitis    7. Hypothyroidism (acquired)    8. Arthritis    9. Former smoker    Other orders  -     Fluticasone-Umeclidin-Vilant (Trelegy Ellipta) 100-62.5-25 MCG/ACT inhaler; Inhale 1 puff Daily.  Dispense: 2 each; Refill: 0        She has had significant shortness of breath and fatigue coupled with mid shoulder blade back pain since covid in Sept. She has had PFTs showing moderate obstruction with significant post bronchodilator response. This could be asthma given that findings. We discussed that she could have several things occurring. She could have long covid that has potentiated undiagnosed obstructive lung disease such as asthma. She has known arthritis, raynaud, hypothyroidism. We will check for autoimmune disease. She has seasonal allergies and will check an Ige+22 allergens and repeat a CBC with diff. We will also check a HRCT given her ongoing symptoms and aforementioned co morbidities. She will use the albuterol HFA if needed. Keep appointment with GI for scopes. We also discussed the role anemia can have with fatigue and shortness of breath. We discussed keep ing her GERD under control and to avoid eating/ drinking 2-3 hours prior to bedtime. She is provided samples of Trelegy ellipta to use as well. Demonstration is provided. She denies glaucoma or urinary retention.     Time: Approximately 45 minutes was spent in face to face time with the patient and her .     Follow Up   Return in about 6 weeks (around 6/23/2023).  Patient was given instructions and counseling regarding her condition or for health maintenance advice. Please see specific information pulled into the AVS if appropriate.     Sheree Lopez, ALTHEA  5/13/2023  13:03 CDT

## 2023-05-15 ENCOUNTER — ANESTHESIA (OUTPATIENT)
Dept: GASTROENTEROLOGY | Facility: HOSPITAL | Age: 73
End: 2023-05-15
Payer: MEDICARE

## 2023-05-15 ENCOUNTER — HOSPITAL ENCOUNTER (OUTPATIENT)
Facility: HOSPITAL | Age: 73
Setting detail: HOSPITAL OUTPATIENT SURGERY
Discharge: HOME | End: 2023-05-15
Attending: INTERNAL MEDICINE | Admitting: INTERNAL MEDICINE
Payer: MEDICARE

## 2023-05-15 ENCOUNTER — ANESTHESIA EVENT (OUTPATIENT)
Dept: GASTROENTEROLOGY | Facility: HOSPITAL | Age: 73
End: 2023-05-15
Payer: MEDICARE

## 2023-05-15 VITALS
HEIGHT: 64 IN | RESPIRATION RATE: 19 BRPM | SYSTOLIC BLOOD PRESSURE: 110 MMHG | WEIGHT: 160 LBS | DIASTOLIC BLOOD PRESSURE: 58 MMHG | HEART RATE: 65 BPM | BODY MASS INDEX: 27.31 KG/M2 | TEMPERATURE: 97.9 F | OXYGEN SATURATION: 99 %

## 2023-05-15 DIAGNOSIS — D50.0 IRON DEFICIENCY ANEMIA DUE TO CHRONIC BLOOD LOSS: Primary | ICD-10-CM

## 2023-05-15 DIAGNOSIS — D50.9 IRON DEFICIENCY ANEMIA, UNSPECIFIED IRON DEFICIENCY ANEMIA TYPE: ICD-10-CM

## 2023-05-15 PROCEDURE — 25010000002 PROPOFOL 10 MG/ML EMULSION

## 2023-05-15 PROCEDURE — 88305 TISSUE EXAM BY PATHOLOGIST: CPT | Performed by: INTERNAL MEDICINE

## 2023-05-15 RX ORDER — SODIUM CHLORIDE 0.9 % (FLUSH) 0.9 %
10 SYRINGE (ML) INJECTION AS NEEDED
Status: DISCONTINUED | OUTPATIENT
Start: 2023-05-15 | End: 2023-05-15 | Stop reason: HOSPADM

## 2023-05-15 RX ORDER — PROPOFOL 10 MG/ML
VIAL (ML) INTRAVENOUS AS NEEDED
Status: DISCONTINUED | OUTPATIENT
Start: 2023-05-15 | End: 2023-05-15 | Stop reason: SURG

## 2023-05-15 RX ORDER — SODIUM CHLORIDE 9 MG/ML
500 INJECTION, SOLUTION INTRAVENOUS CONTINUOUS PRN
Status: DISCONTINUED | OUTPATIENT
Start: 2023-05-15 | End: 2023-05-15 | Stop reason: HOSPADM

## 2023-05-15 RX ORDER — LIDOCAINE HYDROCHLORIDE 20 MG/ML
INJECTION, SOLUTION EPIDURAL; INFILTRATION; INTRACAUDAL; PERINEURAL AS NEEDED
Status: DISCONTINUED | OUTPATIENT
Start: 2023-05-15 | End: 2023-05-15 | Stop reason: SURG

## 2023-05-15 RX ADMIN — LIDOCAINE HYDROCHLORIDE 100 MG: 20 INJECTION, SOLUTION EPIDURAL; INFILTRATION; INTRACAUDAL; PERINEURAL at 08:23

## 2023-05-15 RX ADMIN — PROPOFOL INJECTABLE EMULSION 450 MG: 10 INJECTION, EMULSION INTRAVENOUS at 08:23

## 2023-05-15 RX ADMIN — SODIUM CHLORIDE 500 ML: 9 INJECTION, SOLUTION INTRAVENOUS at 07:34

## 2023-05-15 NOTE — ANESTHESIA POSTPROCEDURE EVALUATION
Patient: Liudmila Ga    Procedure Summary     Date: 05/15/23 Room / Location: [facility] ENDOSCOPY 2 / [facility] ENDOSCOPY    Anesthesia Start: 0821 Anesthesia Stop: 0855    Procedures:       ESOPHAGOGASTRODUODENOSCOPY WITH ANESTHESIA      COLONOSCOPY WITH ANESTHESIA Diagnosis:       Iron deficiency anemia, unspecified iron deficiency anemia type      (Iron deficiency anemia, unspecified iron deficiency anemia type [D50.9])    Surgeons: Stevan Mullins MD Provider: Harrison Tamayo CRNA    Anesthesia Type: MAC ASA Status: 2          Anesthesia Type: MAC    Vitals  Vitals Value Taken Time   BP     Temp     Pulse 68 05/15/23 0855   Resp     SpO2     Vitals shown include unvalidated device data.        Post Anesthesia Care and Evaluation    Patient location during evaluation: PHASE II  Patient participation: complete - patient participated  Level of consciousness: awake and alert  Pain score: 0    Airway patency: patent  Anesthetic complications: No anesthetic complications  PONV Status: none  Cardiovascular status: acceptable  Respiratory status: acceptable  Hydration status: acceptable  No anesthesia care post op

## 2023-05-15 NOTE — ANESTHESIA PREPROCEDURE EVALUATION
Anesthesia Evaluation     Patient summary reviewed   no history of anesthetic complications:  NPO Solid Status: > 8 hours             Airway   Mallampati: II  TM distance: >3 FB  No difficulty expected  Dental          Pulmonary    (+) asthma,sleep apnea on CPAP,   (-) not a smoker  Cardiovascular   Exercise tolerance: good (4-7 METS)    (+) hypertension, CAD (nonobstructive CAD), dysrhythmias Tachycardia, hyperlipidemia,   (-) past MI      Neuro/Psych  (-) seizures, TIA, CVA  GI/Hepatic/Renal/Endo    (+)  GERD,  renal disease stones and CRI, thyroid problem   (-) liver disease    Musculoskeletal     Abdominal    Substance History      OB/GYN          Other                        Anesthesia Plan    ASA 2     MAC       Anesthetic plan, risks, benefits, and alternatives have been provided, discussed and informed consent has been obtained with: patient and spouse/significant other.        CODE STATUS:

## 2023-05-16 LAB
CYTO UR: NORMAL
LAB AP CASE REPORT: NORMAL
Lab: NORMAL
PATH REPORT.FINAL DX SPEC: NORMAL
PATH REPORT.GROSS SPEC: NORMAL

## 2023-05-18 LAB
A ALTERNATA IGE QN: <0.1 KU/L
A FUMIGATUS IGE QN: <0.1 KU/L
ANA SER QL: NEGATIVE
BASOPHILS # BLD AUTO: 0 X10E3/UL (ref 0–0.2)
BASOPHILS NFR BLD AUTO: 0 %
BERMUDA GRASS IGE QN: <0.1 KU/L
BOXELDER IGE QN: <0.1 KU/L
C HERBARUM IGE QN: <0.1 KU/L
C-ANCA TITR SER IF: ABNORMAL TITER
CAT DANDER IGE QN: <0.1 KU/L
COMMON RAGWEED IGE QN: <0.1 KU/L
CONV CLASS DESCRIPTION: NORMAL
COTTONWOOD IGE QN: <0.1 KU/L
D FARINAE IGE QN: <0.1 KU/L
D PTERONYSS IGE QN: <0.1 KU/L
DOG DANDER IGE QN: <0.1 KU/L
EOSINOPHIL # BLD AUTO: 0.1 X10E3/UL (ref 0–0.4)
EOSINOPHIL NFR BLD AUTO: 2 %
ERYTHROCYTE [DISTWIDTH] IN BLOOD BY AUTOMATED COUNT: 13.1 % (ref 11.7–15.4)
HCT VFR BLD AUTO: 37 % (ref 34–46.6)
HGB BLD-MCNC: 11.8 G/DL (ref 11.1–15.9)
IGE SERPL-ACNC: 39 IU/ML (ref 6–495)
IMM GRANULOCYTES # BLD AUTO: 0 X10E3/UL (ref 0–0.1)
IMM GRANULOCYTES NFR BLD AUTO: 0 %
LYMPHOCYTES # BLD AUTO: 1.3 X10E3/UL (ref 0.7–3.1)
LYMPHOCYTES NFR BLD AUTO: 22 %
MCH RBC QN AUTO: 30.2 PG (ref 26.6–33)
MCHC RBC AUTO-ENTMCNC: 31.9 G/DL (ref 31.5–35.7)
MCV RBC AUTO: 95 FL (ref 79–97)
MONOCYTES # BLD AUTO: 0.4 X10E3/UL (ref 0.1–0.9)
MONOCYTES NFR BLD AUTO: 8 %
MT JUNIPER IGE QN: <0.1 KU/L
MYELOPEROXIDASE AB SER IA-ACNC: <0.2 UNITS (ref 0–0.9)
NETTLE IGE QN: <0.1 KU/L
NEUTROPHILS # BLD AUTO: 3.9 X10E3/UL (ref 1.4–7)
NEUTROPHILS NFR BLD AUTO: 68 %
P NOTATUM IGE QN: <0.1 KU/L
P-ANCA ATYPICAL TITR SER IF: ABNORMAL TITER
P-ANCA TITR SER IF: ABNORMAL TITER
PLATELET # BLD AUTO: 263 X10E3/UL (ref 150–450)
PROTEINASE3 AB SER IA-ACNC: <0.2 UNITS (ref 0–0.9)
RBC # BLD AUTO: 3.91 X10E6/UL (ref 3.77–5.28)
RHEUMATOID FACT SERPL-ACNC: <10 IU/ML
ROACH IGE QN: <0.1 KU/L
SALTWORT IGE QN: <0.1 KU/L
SHEEP SORREL IGE QN: <0.1 KU/L
TIMOTHY IGE QN: <0.1 KU/L
TSH SERPL DL<=0.005 MIU/L-ACNC: 1.57 UIU/ML (ref 0.45–4.5)
WBC # BLD AUTO: 5.8 X10E3/UL (ref 3.4–10.8)
WHITE ASH IGE QN: <0.1 KU/L
WHITE ELM IGE QN: <0.1 KU/L
WHITE MULBERRY IGE QN: <0.1 KU/L
WHITE OAK IGE QN: <0.1 KU/L

## 2023-05-24 ENCOUNTER — TRANSCRIBE ORDERS (OUTPATIENT)
Dept: LAB | Facility: HOSPITAL | Age: 73
End: 2023-05-24
Payer: MEDICARE

## 2023-05-24 ENCOUNTER — LAB (OUTPATIENT)
Dept: LAB | Facility: HOSPITAL | Age: 73
End: 2023-05-24
Payer: MEDICARE

## 2023-05-24 DIAGNOSIS — R94.4 ABNORMAL RESULTS OF KIDNEY FUNCTION STUDIES: Primary | ICD-10-CM

## 2023-05-24 DIAGNOSIS — R94.4 ABNORMAL RESULTS OF KIDNEY FUNCTION STUDIES: ICD-10-CM

## 2023-05-24 LAB
ANION GAP SERPL CALCULATED.3IONS-SCNC: 12 MMOL/L (ref 5–15)
BUN SERPL-MCNC: 13 MG/DL (ref 8–23)
BUN/CREAT SERPL: 13.3 (ref 7–25)
CALCIUM SPEC-SCNC: 9.9 MG/DL (ref 8.6–10.5)
CHLORIDE SERPL-SCNC: 102 MMOL/L (ref 98–107)
CO2 SERPL-SCNC: 27 MMOL/L (ref 22–29)
CREAT SERPL-MCNC: 0.98 MG/DL (ref 0.57–1)
EGFRCR SERPLBLD CKD-EPI 2021: 60.7 ML/MIN/1.73
GLUCOSE SERPL-MCNC: 139 MG/DL (ref 65–99)
POTASSIUM SERPL-SCNC: 4.8 MMOL/L (ref 3.5–5.2)
SODIUM SERPL-SCNC: 141 MMOL/L (ref 136–145)

## 2023-05-24 PROCEDURE — 80048 BASIC METABOLIC PNL TOTAL CA: CPT

## 2023-05-24 PROCEDURE — 36415 COLL VENOUS BLD VENIPUNCTURE: CPT

## 2023-05-30 ENCOUNTER — OFFICE VISIT (OUTPATIENT)
Dept: CARDIOLOGY | Facility: CLINIC | Age: 73
End: 2023-05-30

## 2023-05-30 VITALS
DIASTOLIC BLOOD PRESSURE: 75 MMHG | SYSTOLIC BLOOD PRESSURE: 138 MMHG | WEIGHT: 157 LBS | HEART RATE: 62 BPM | BODY MASS INDEX: 26.8 KG/M2 | HEIGHT: 64 IN

## 2023-05-30 DIAGNOSIS — R13.19 ESOPHAGEAL DYSPHAGIA: ICD-10-CM

## 2023-05-30 DIAGNOSIS — E78.2 MIXED HYPERLIPIDEMIA: ICD-10-CM

## 2023-05-30 DIAGNOSIS — I47.1 PSVT (PAROXYSMAL SUPRAVENTRICULAR TACHYCARDIA): ICD-10-CM

## 2023-05-30 DIAGNOSIS — R53.83 EASY FATIGABILITY: ICD-10-CM

## 2023-05-30 DIAGNOSIS — E03.9 HYPOTHYROIDISM (ACQUIRED): Primary | ICD-10-CM

## 2023-05-30 DIAGNOSIS — R93.1 ABNORMAL CARDIAC CT ANGIOGRAPHY: ICD-10-CM

## 2023-05-30 RX ORDER — ROSUVASTATIN CALCIUM 5 MG/1
5 TABLET, COATED ORAL DAILY
Qty: 90 TABLET | Refills: 3 | Status: SHIPPED | OUTPATIENT
Start: 2023-05-30

## 2023-05-30 NOTE — PROGRESS NOTES
Liudmila Ga  [id number]  1950  72 y.o.  female    Referring Provider: Nigel Ramos MD    Reason for  Visit:      Here for routine follow up        Subjective    Mild  exertional shortness of breath on exertion relieved with rest  No significant cough or wheezing  Overall shortness of breath now better   Using inhaler now     No palpitations   Now resolved on beta blocker therapy   No associated chest pain  No significant pedal edema    No fever or chills  No significant expectoration    No hemoptysis  No presyncope or syncope    Tolerating current medications well with no untoward side effects   Compliant with prescribed medication regimen. Tries to adhere to cardiac diet.     Easy fatiguability and increasing tired  Feels energy levels running low      BP well controlled at home.     Joint pain in small, medium and large joints   Cardiac cath as below       History of present illness:  Liudmila Ga is a 72 y.o. yo female with coronary calcification acquired hypothyroidism   who presents today for   Chief Complaint   Patient presents with    Follow-up     S/p CATH RESULTS    .    History  Past Medical History:   Diagnosis Date    Arthritis     Colon polyps     Depression     Disease of thyroid gland     GERD (gastroesophageal reflux disease)     Hiatal hernia     Hypothyroid     Iron deficiency anemia     Kidney stone     Osteopenia     Overactive bladder     Raynaud disease     Seasonal allergies     Vitamin D deficiency    ,   Past Surgical History:   Procedure Laterality Date    CARDIAC CATHETERIZATION N/A 04/23/2023    Procedure: Left Heart Cath;  Surgeon: Osiel Suárez DO;  Location: [facility] CATH INVASIVE LOCATION;  Service: Cardiovascular;  Laterality: N/A;    CHOLECYSTECTOMY  2004    COLONOSCOPY  08/20/2014    HYPERPLASTIC POLYP ILEOCECAL VALVE AND HEPATIC FLEXURE, DIVERTICULA IN SIGMOID COLON    COLONOSCOPY  02/04/2014    TUBULAR ADENOMA CECAL POLYP, TUBULAR ADENOMA  RECTAL POLYP, HYPERPLASTICS POLYPS AT ILEOCECAL VALVE AND RECTAL.    COLONOSCOPY N/A 2017    2 Sessile serrated adenomas large intestine and hepatic flexure, 2 tubular adenomas cecum and at 60 cm  repeat exam in 3 years    COLONOSCOPY N/A 2020    Tics otherwise normal exam repeat in 3 years    COLONOSCOPY N/A 5/15/2023    Procedure: COLONOSCOPY WITH ANESTHESIA;  Surgeon: Stevan Mullins MD;  Location: [facility] ENDOSCOPY;  Service: Gastroenterology;  Laterality: N/A;  Pre: Iron deficiency anemia, Hx of adenomatous colonic polyps, Family hx of colon cancer  Post: Diverticulosis, Polyp  Meals, Nigel Chacon MD    ENDOSCOPY  2015    HIATAL HERNIA, MILD SCHATZKI RING DILATED    ENDOSCOPY N/A 2017    HH, dilated otherwise normal exam    ENDOSCOPY N/A 2019    Small HH, NOn-obstructing Schatzki ring dilated, food residue in stomach    ENDOSCOPY N/A 10/03/2019    Gastritis otherwise normal exam    ENDOSCOPY N/A 5/15/2023    Procedure: ESOPHAGOGASTRODUODENOSCOPY WITH ANESTHESIA;  Surgeon: Stevan Mullins MD;  Location: [facility] ENDOSCOPY;  Service: Gastroenterology;  Laterality: N/A;  Pre: Iron deficiency anemia, Gastroesophageal reflux disease without esophagitis, Esophageal dysphagia  Post: Normal  Meals, Nigel Chacon MD    HYSTERECTOMY  2011    TOTAL    KIDNEY STONE SURGERY  2022    TONSILLECTOMY      TOTAL LAPAROSCOPIC HYSTERECTOMY WITH BLADDER NECK SUSPENSION     ,   Family History   Problem Relation Age of Onset    Food intolerance Mother     Liver cancer Mother     Heart disease Father     Colon cancer Brother     Colon polyps Brother     Breast cancer Neg Hx     Ovarian cancer Neg Hx    ,   Social History     Tobacco Use    Smoking status: Former     Packs/day: 1.00     Years: 45.00     Pack years: 45.00     Types: Cigarettes     Quit date: 2012     Years since quittin.4    Smokeless tobacco: Never   Vaping Use    Vaping Use: Never used   Substance Use Topics    Alcohol use:  Not Currently     Comment: rarely    Drug use: Never   ,     Medications  Current Outpatient Medications   Medication Sig Dispense Refill    atenolol (TENORMIN) 25 MG tablet Take 1 tablet by mouth Daily.      azithromycin (AZASITE) 1 % ophthalmic solution 1 drop 2 (Two) Times a Day.      Biotin 61296 MCG tablet Take  by mouth.      bisacodyl (DULCOLAX) 5 MG EC tablet Take 1 tablet by mouth Daily As Needed for Constipation.      Cholecalciferol (VITAMIN D3) 2000 units tablet Take  by mouth.      dilTIAZem CD (CARDIZEM CD) 120 MG 24 hr capsule Take 1 capsule by mouth Daily.      DULoxetine (CYMBALTA) 20 MG capsule Take 1 capsule by mouth Every 72 (Seventy-Two) Hours.      ferrous sulfate 325 (65 FE) MG tablet Take 1 tablet by mouth Daily With Breakfast. 30 tablet 5    fluticasone (FLONASE) 50 MCG/ACT nasal spray 2 sprays into the nostril(s) as directed by provider Daily.      Fluticasone-Umeclidin-Vilant (Trelegy Ellipta) 100-62.5-25 MCG/ACT inhaler Inhale 1 puff Daily. 2 each 0    loratadine (CLARITIN) 10 MG tablet Take 1 tablet by mouth Daily.      Lysine 500 MG capsule Take 500 mg by mouth.      metoclopramide (REGLAN) 5 MG tablet Take 1 tablet by mouth 2 (Two) Times a Day.      Omega-3 Fatty Acids (FISH OIL) 1200 MG capsule capsule Take 1 capsule by mouth Daily.      pantoprazole (PROTONIX) 40 MG EC tablet Take 1 tablet by mouth Daily.      potassium citrate (UROCIT-K) 10 MEQ (1080 MG) CR tablet 1 tablet 2 (Two) Times a Day.      rosuvastatin (CRESTOR) 5 MG tablet Take 1 tablet by mouth Daily. 90 tablet 3    Synthroid 50 MCG tablet Take 1 tablet by mouth Daily.      vitamin C (ASCORBIC ACID) 500 MG tablet Take 2 tablets by mouth Daily.      Zinc 50 MG capsule Take  by mouth.      zolpidem (AMBIEN) 10 MG tablet Take 1 tablet by mouth Every Night.       No current facility-administered medications for this visit.       Allergies:  Lortab [hydrocodone-acetaminophen], Percocet [oxycodone-acetaminophen], and Sulfa  "antibiotics    Review of Systems  Review of Systems   Constitutional: Positive for malaise/fatigue.   HENT: Negative.     Eyes: Negative.    Cardiovascular:  Positive for dyspnea on exertion. Negative for chest pain, claudication, cyanosis, irregular heartbeat, leg swelling, near-syncope, orthopnea, palpitations, paroxysmal nocturnal dyspnea and syncope.   Respiratory: Negative.     Endocrine: Negative.    Hematologic/Lymphatic: Negative.    Skin: Negative.    Musculoskeletal:  Positive for arthritis, back pain and joint pain.   Gastrointestinal:  Negative for anorexia.   Genitourinary: Negative.    Neurological: Negative.    Psychiatric/Behavioral: Negative.       Objective     Physical Exam:  /75   Pulse 62   Ht 162.6 cm (64\")   Wt 71.2 kg (157 lb)   BMI 26.95 kg/m²     Physical Exam  Constitutional:       Appearance: Normal appearance.   HENT:      Head: Normocephalic.   Eyes:      General: Lids are normal.   Neck:      Vascular: No carotid bruit.   Cardiovascular:      Rate and Rhythm: Regular rhythm.      Heart sounds: S1 normal and S2 normal. Murmur heard.   Systolic murmur is present with a grade of 2/6.   Pulmonary:      Effort: Pulmonary effort is normal.   Abdominal:      Palpations: Abdomen is soft.   Neurological:      Mental Status: She is alert.   Psychiatric:         Speech: Speech normal.         Behavior: Behavior normal.         Thought Content: Thought content normal.       Results Review:    PACS Images     Radiology Images    Study Result    Narrative & Impression   High-resolution computed tomography imaging of the chest was performed  Particular attention paid to coronary arteries.  Images from the examination were analyzed for the presence and extent of coronary artery calcification using coronary calcification quantification software.  Patient tolerated the procedure well and there were no complications.  The results of coronary calcification analysis are as below.  The patient " scores compared with published data relating to scores for people of similar age and the same gender.     Left main: 104.9  Left anterior descending coronary artery: 70.9  Left circumflex: 0  Right coronary artery: 0  Total calcium score of: 175.8     This patient with a coronary calcium score of 175.8 is at percentile 74  for matched population using CARREON calcium score calculator        Conclusion:       Current calcium score is at 74th percentile for matched population  Risk of intermediate to long-term cardiovascular events is elevated        Recommendation: Ongoing risk factor modifications  Further work-up if patient has chest pain       Results for orders placed during the hospital encounter of 06/24/22    Adult Transthoracic Echo Complete w/ Color, Spectral and Contrast if necessary per protocol    Interpretation Summary  · Left ventricular ejection fraction appears to be 61 - 65%. Left ventricular systolic function is normal.  · Left ventricular diastolic function was indeterminate.  · Normal global longitudinal LV strain (GLS) = -19.0%  · Left atrial volume is mildly increased.  · Estimated right ventricular systolic pressure from tricuspid regurgitation is normal (<35 mmHg).     Impression:     1. Mild, nonobstructive coronary artery disease as described above  2. Acute kidney injury, resolved  3. Hypertension  4. Paroxysmal supraventricular tachycardia  5. Hyperlipidemia        Plan:      1. TR band off in 2 hours, discharge home later today  2. Follow-up with Dr. Arevalo in the clinic continue to optimize her medical regimen modify her cardiovascular risk factors         ____________________________________________________________________________________________________________________________________________  Health maintenance and recommendations    Low salt/ HTN/ Heart healthy carbohydrate restricted cardiac diet   The patient is advised to reduce or avoid caffeine or other cardiac stimulants.   Minimize  or avoid  NSAID-type medications      Monitor for any signs of bleeding including red or dark stools. Fall precautions.   Advised staying uptodate with immunizations per established standard guidelines.    Offered to give patient  a copy of my notes     Questions were encouraged, asked and answered to the patient's  understanding and satisfaction. Questions if any regarding current medications and side effects, need for refills and importance of compliance to medications stressed.    Reviewed available prior notes, consults, prior visits, laboratory findings, radiology and cardiology relevant reports. Updated chart as applicable. I have reviewed the patient's medical history in detail and updated the computerized patient record as relevant.      Updated patient regarding any new or relevant abnormalities on review of records or any new findings on physical exam. Mentioned to patient about purpose of visit and desirable health short and long term goals and objectives.    Primary to monitor CBC CMP Lipid panel and TSH as applicable    ___________________________________________________________________________________________________________________________________________   Procedures    Assessment & Plan   Diagnoses and all orders for this visit:    1. Hypothyroidism (acquired) (Primary)    2. Easy fatigability    3. Esophageal dysphagia    4. Abnormal cardiac CT angiography    5. PSVT (paroxysmal supraventricular tachycardia)    6. Mixed hyperlipidemia    Other orders  -     rosuvastatin (CRESTOR) 5 MG tablet; Take 1 tablet by mouth Daily.  Dispense: 90 tablet; Refill: 3          Plan    Cardiac workup test results as below: echo,  CAC and cardiac cath     Overall doing well no new cardiovascular symptoms and therefore no additional cardiac testing is required prior to next visit  If any interim issues arise will call me for further evaluation.     Start ECASA 81 mg daily regimen given risk factors and monitor for  any signs of bleeding including red or dark stools. Fall precautions.      Keep LDL below 70 mg/dl. Monitor liver and renal functions.   Requested Prescriptions     Signed Prescriptions Disp Refills    rosuvastatin (CRESTOR) 5 MG tablet 90 tablet 3     Sig: Take 1 tablet by mouth Daily.      Monitor CBC, CMP, TSH (as indicated) and Lipid Panel by primary     Check BP and heart rates twice daily initially till blood pressures and heart rates under good control and then at least 3x / week,   If blood pressures continue to be well-controlled then can check week a month  at home and bring a recording for review next visit  If BP >130/85 or < 100/60 persistently over 3 reading 30 mins apart or if heart rates persistently above 100 bpm or less than 55 bpm call sooner for evaluation and advise     Keep LDL below 70 mg/dl. Monitor liver and renal functions.   Monitor CBC, CMP, TSH (as indicated) and Lipid Panel by primary                Return in about 6 months (around 11/30/2023).

## 2023-06-05 ENCOUNTER — TELEPHONE (OUTPATIENT)
Dept: GASTROENTEROLOGY | Facility: CLINIC | Age: 73
End: 2023-06-05
Payer: MEDICARE

## 2023-06-05 NOTE — TELEPHONE ENCOUNTER
Spoke with patient and she is going to follow up with PCP about anemia workup. She voiced understanding.

## 2023-06-05 NOTE — TELEPHONE ENCOUNTER
----- Message from Stevan Mullins MD sent at 6/1/2023 11:11 AM CDT -----  Tell her to check with her pcp and see how they want to proceed  ----- Message -----  From: Temo Heaton MA  Sent: 6/1/2023  10:51 AM CDT  To: Stevan Mullins MD    Patient has not had CT enterography done, she told scheduling she has problems with dye due to her kidney function. They cancelled her recent heart cath because of her kidney function, etc. She wants to know if maybe you want to order pre- and post-hydration to help flush her kidneys, or order a different test? Most recent BMP looked good to me though.     Also she is scheduled to see Kelin on 6/6 the CT will not be back by then if you decide to continue, she we postpone until completed?     Thanks,     Temo

## 2023-06-06 ENCOUNTER — LAB (OUTPATIENT)
Dept: LAB | Facility: HOSPITAL | Age: 73
End: 2023-06-06
Payer: MEDICARE

## 2023-06-06 ENCOUNTER — TRANSCRIBE ORDERS (OUTPATIENT)
Dept: LAB | Facility: HOSPITAL | Age: 73
End: 2023-06-06
Payer: MEDICARE

## 2023-06-06 DIAGNOSIS — R73.9 BLOOD GLUCOSE ELEVATED: Primary | ICD-10-CM

## 2023-06-06 DIAGNOSIS — R73.9 BLOOD GLUCOSE ELEVATED: ICD-10-CM

## 2023-06-06 LAB — HBA1C MFR BLD: 5.8 % (ref 4.8–5.6)

## 2023-06-06 PROCEDURE — 36415 COLL VENOUS BLD VENIPUNCTURE: CPT

## 2023-06-06 PROCEDURE — 83036 HEMOGLOBIN GLYCOSYLATED A1C: CPT

## 2023-06-14 ENCOUNTER — LAB (OUTPATIENT)
Dept: LAB | Facility: HOSPITAL | Age: 73
End: 2023-06-14
Payer: MEDICARE

## 2023-06-14 ENCOUNTER — TRANSCRIBE ORDERS (OUTPATIENT)
Dept: ADMINISTRATIVE | Facility: HOSPITAL | Age: 73
End: 2023-06-14
Payer: MEDICARE

## 2023-06-14 DIAGNOSIS — E87.5 HYPERKALEMIA: Primary | ICD-10-CM

## 2023-06-14 DIAGNOSIS — R94.4 ABNORMAL RESULTS OF KIDNEY FUNCTION STUDIES: ICD-10-CM

## 2023-06-14 DIAGNOSIS — R94.4 ABNORMAL RESULTS OF KIDNEY FUNCTION STUDIES: Primary | ICD-10-CM

## 2023-06-14 LAB
ALBUMIN SERPL-MCNC: 4.4 G/DL (ref 3.5–5.2)
ALBUMIN/GLOB SERPL: 1.6 G/DL
ALP SERPL-CCNC: 110 U/L (ref 39–117)
ALT SERPL W P-5'-P-CCNC: 25 U/L (ref 1–33)
ANION GAP SERPL CALCULATED.3IONS-SCNC: 10 MMOL/L (ref 5–15)
AST SERPL-CCNC: 25 U/L (ref 1–32)
BILIRUB SERPL-MCNC: 0.3 MG/DL (ref 0–1.2)
BUN SERPL-MCNC: 19 MG/DL (ref 8–23)
BUN/CREAT SERPL: 17.4 (ref 7–25)
CALCIUM SPEC-SCNC: 10.2 MG/DL (ref 8.6–10.5)
CHLORIDE SERPL-SCNC: 105 MMOL/L (ref 98–107)
CO2 SERPL-SCNC: 24 MMOL/L (ref 22–29)
CREAT SERPL-MCNC: 1.09 MG/DL (ref 0.57–1)
EGFRCR SERPLBLD CKD-EPI 2021: 53.4 ML/MIN/1.73
GLOBULIN UR ELPH-MCNC: 2.8 GM/DL
GLUCOSE SERPL-MCNC: 111 MG/DL (ref 65–99)
POTASSIUM SERPL-SCNC: 5.4 MMOL/L (ref 3.5–5.2)
PROT SERPL-MCNC: 7.2 G/DL (ref 6–8.5)
SODIUM SERPL-SCNC: 139 MMOL/L (ref 136–145)

## 2023-06-14 PROCEDURE — 80053 COMPREHEN METABOLIC PANEL: CPT

## 2023-06-14 PROCEDURE — 36415 COLL VENOUS BLD VENIPUNCTURE: CPT

## 2023-08-18 ENCOUNTER — LAB (OUTPATIENT)
Dept: LAB | Facility: HOSPITAL | Age: 73
End: 2023-08-18
Payer: MEDICARE

## 2023-08-18 ENCOUNTER — TRANSCRIBE ORDERS (OUTPATIENT)
Dept: LAB | Facility: HOSPITAL | Age: 73
End: 2023-08-18
Payer: MEDICARE

## 2023-08-18 DIAGNOSIS — R94.4 ABNORMAL KIDNEY FUNCTION STUDY: ICD-10-CM

## 2023-08-18 DIAGNOSIS — R94.4 ABNORMAL KIDNEY FUNCTION STUDY: Primary | ICD-10-CM

## 2023-08-18 LAB
ALBUMIN SERPL-MCNC: 4.4 G/DL (ref 3.5–5.2)
ALBUMIN/GLOB SERPL: 1.9 G/DL
ALP SERPL-CCNC: 93 U/L (ref 39–117)
ALT SERPL W P-5'-P-CCNC: 23 U/L (ref 1–33)
ANION GAP SERPL CALCULATED.3IONS-SCNC: 8 MMOL/L (ref 5–15)
AST SERPL-CCNC: 23 U/L (ref 1–32)
BILIRUB SERPL-MCNC: 0.5 MG/DL (ref 0–1.2)
BUN SERPL-MCNC: 11 MG/DL (ref 8–23)
BUN/CREAT SERPL: 11.1 (ref 7–25)
CALCIUM SPEC-SCNC: 9.6 MG/DL (ref 8.6–10.5)
CHLORIDE SERPL-SCNC: 105 MMOL/L (ref 98–107)
CO2 SERPL-SCNC: 27 MMOL/L (ref 22–29)
CREAT SERPL-MCNC: 0.99 MG/DL (ref 0.57–1)
EGFRCR SERPLBLD CKD-EPI 2021: 60 ML/MIN/1.73
GLOBULIN UR ELPH-MCNC: 2.3 GM/DL
GLUCOSE SERPL-MCNC: 93 MG/DL (ref 65–99)
POTASSIUM SERPL-SCNC: 4.8 MMOL/L (ref 3.5–5.2)
PROT SERPL-MCNC: 6.7 G/DL (ref 6–8.5)
SODIUM SERPL-SCNC: 140 MMOL/L (ref 136–145)

## 2023-08-18 PROCEDURE — 36415 COLL VENOUS BLD VENIPUNCTURE: CPT

## 2023-08-18 PROCEDURE — 80053 COMPREHEN METABOLIC PANEL: CPT

## 2023-10-25 ENCOUNTER — TRANSCRIBE ORDERS (OUTPATIENT)
Dept: ADMINISTRATIVE | Facility: HOSPITAL | Age: 73
End: 2023-10-25
Payer: MEDICARE

## 2023-10-25 ENCOUNTER — LAB (OUTPATIENT)
Dept: LAB | Facility: HOSPITAL | Age: 73
End: 2023-10-25
Payer: MEDICARE

## 2023-10-25 DIAGNOSIS — N18.2 STAGE 2 CHRONIC KIDNEY DISEASE: ICD-10-CM

## 2023-10-25 DIAGNOSIS — E03.9 HYPOTHYROIDISM, UNSPECIFIED TYPE: Primary | ICD-10-CM

## 2023-10-25 DIAGNOSIS — E03.9 HYPOTHYROIDISM, UNSPECIFIED TYPE: ICD-10-CM

## 2023-10-25 LAB
ALBUMIN SERPL-MCNC: 4.4 G/DL (ref 3.5–5.2)
ALBUMIN/GLOB SERPL: 1.7 G/DL
ALP SERPL-CCNC: 90 U/L (ref 39–117)
ALT SERPL W P-5'-P-CCNC: 21 U/L (ref 1–33)
ANION GAP SERPL CALCULATED.3IONS-SCNC: 8 MMOL/L (ref 5–15)
AST SERPL-CCNC: 26 U/L (ref 1–32)
BILIRUB SERPL-MCNC: 0.4 MG/DL (ref 0–1.2)
BUN SERPL-MCNC: 11 MG/DL (ref 8–23)
BUN/CREAT SERPL: 13.1 (ref 7–25)
CALCIUM SPEC-SCNC: 10.1 MG/DL (ref 8.6–10.5)
CHLORIDE SERPL-SCNC: 106 MMOL/L (ref 98–107)
CO2 SERPL-SCNC: 30 MMOL/L (ref 22–29)
CREAT SERPL-MCNC: 0.84 MG/DL (ref 0.57–1)
EGFRCR SERPLBLD CKD-EPI 2021: 73 ML/MIN/1.73
GLOBULIN UR ELPH-MCNC: 2.6 GM/DL
GLUCOSE SERPL-MCNC: 101 MG/DL (ref 65–99)
POTASSIUM SERPL-SCNC: 4.5 MMOL/L (ref 3.5–5.2)
PROT SERPL-MCNC: 7 G/DL (ref 6–8.5)
SODIUM SERPL-SCNC: 144 MMOL/L (ref 136–145)
TSH SERPL DL<=0.05 MIU/L-ACNC: 1.64 UIU/ML (ref 0.27–4.2)

## 2023-10-25 PROCEDURE — 36415 COLL VENOUS BLD VENIPUNCTURE: CPT | Performed by: INTERNAL MEDICINE

## 2023-10-25 PROCEDURE — 80053 COMPREHEN METABOLIC PANEL: CPT | Performed by: INTERNAL MEDICINE

## 2023-10-25 PROCEDURE — 84443 ASSAY THYROID STIM HORMONE: CPT

## 2023-12-20 ENCOUNTER — LAB (OUTPATIENT)
Dept: LAB | Facility: HOSPITAL | Age: 73
End: 2023-12-20
Payer: MEDICARE

## 2023-12-20 ENCOUNTER — TRANSCRIBE ORDERS (OUTPATIENT)
Dept: ADMINISTRATIVE | Facility: HOSPITAL | Age: 73
End: 2023-12-20
Payer: MEDICARE

## 2023-12-20 DIAGNOSIS — N18.2 CHRONIC KIDNEY DISEASE, STAGE 2 (MILD): Primary | ICD-10-CM

## 2023-12-20 LAB
ALBUMIN SERPL-MCNC: 4.4 G/DL (ref 3.5–5.2)
ALBUMIN/GLOB SERPL: 1.6 G/DL
ALP SERPL-CCNC: 97 U/L (ref 39–117)
ALT SERPL W P-5'-P-CCNC: 18 U/L (ref 1–33)
ANION GAP SERPL CALCULATED.3IONS-SCNC: 10 MMOL/L (ref 5–15)
AST SERPL-CCNC: 24 U/L (ref 1–32)
BILIRUB SERPL-MCNC: 0.5 MG/DL (ref 0–1.2)
BUN SERPL-MCNC: 11 MG/DL (ref 8–23)
BUN/CREAT SERPL: 13.4 (ref 7–25)
CALCIUM SPEC-SCNC: 9.4 MG/DL (ref 8.6–10.5)
CHLORIDE SERPL-SCNC: 106 MMOL/L (ref 98–107)
CO2 SERPL-SCNC: 26 MMOL/L (ref 22–29)
CREAT SERPL-MCNC: 0.82 MG/DL (ref 0.57–1)
EGFRCR SERPLBLD CKD-EPI 2021: 74.7 ML/MIN/1.73
GLOBULIN UR ELPH-MCNC: 2.8 GM/DL
GLUCOSE SERPL-MCNC: 107 MG/DL (ref 65–99)
POTASSIUM SERPL-SCNC: 4 MMOL/L (ref 3.5–5.2)
PROT SERPL-MCNC: 7.2 G/DL (ref 6–8.5)
SODIUM SERPL-SCNC: 142 MMOL/L (ref 136–145)

## 2023-12-20 PROCEDURE — 80053 COMPREHEN METABOLIC PANEL: CPT | Performed by: INTERNAL MEDICINE

## 2023-12-20 PROCEDURE — 36415 COLL VENOUS BLD VENIPUNCTURE: CPT | Performed by: INTERNAL MEDICINE

## 2024-02-23 ENCOUNTER — HOSPITAL ENCOUNTER (OUTPATIENT)
Dept: GENERAL RADIOLOGY | Facility: HOSPITAL | Age: 74
Discharge: HOME | End: 2024-02-23
Payer: MEDICARE

## 2024-02-23 ENCOUNTER — TRANSCRIBE ORDERS (OUTPATIENT)
Dept: ADMINISTRATIVE | Facility: HOSPITAL | Age: 74
End: 2024-02-23
Payer: MEDICARE

## 2024-02-23 DIAGNOSIS — M54.2 CERVICALGIA: Primary | ICD-10-CM

## 2024-02-23 DIAGNOSIS — M54.2 CERVICALGIA: ICD-10-CM

## 2024-02-23 PROCEDURE — 72050 X-RAY EXAM NECK SPINE 4/5VWS: CPT

## 2024-04-10 ENCOUNTER — TRANSCRIBE ORDERS (OUTPATIENT)
Dept: PHYSICAL THERAPY | Facility: CLINIC | Age: 74
End: 2024-04-10
Payer: MEDICARE

## 2024-04-10 ENCOUNTER — TRANSCRIBE ORDERS (OUTPATIENT)
Dept: ADMINISTRATIVE | Facility: HOSPITAL | Age: 74
End: 2024-04-10
Payer: MEDICARE

## 2024-04-10 ENCOUNTER — HOSPITAL ENCOUNTER (OUTPATIENT)
Dept: GENERAL RADIOLOGY | Facility: HOSPITAL | Age: 74
Discharge: HOME | End: 2024-04-10
Admitting: PHYSICIAN ASSISTANT
Payer: MEDICARE

## 2024-04-10 DIAGNOSIS — M54.2 CERVICALGIA: Primary | ICD-10-CM

## 2024-04-10 DIAGNOSIS — M54.2 CERVICALGIA: ICD-10-CM

## 2024-04-10 DIAGNOSIS — M47.812 CERVICAL SPONDYLOSIS: ICD-10-CM

## 2024-04-10 DIAGNOSIS — G89.29 OTHER CHRONIC PAIN: ICD-10-CM

## 2024-04-10 DIAGNOSIS — M50.322 DEGENERATION OF C5-C6 INTERVERTEBRAL DISC: ICD-10-CM

## 2024-04-10 PROCEDURE — 72050 X-RAY EXAM NECK SPINE 4/5VWS: CPT

## 2024-04-15 ENCOUNTER — TREATMENT (OUTPATIENT)
Dept: PHYSICAL THERAPY | Facility: CLINIC | Age: 74
End: 2024-04-15
Payer: MEDICARE

## 2024-04-15 DIAGNOSIS — M25.512 ACUTE PAIN OF LEFT SHOULDER: ICD-10-CM

## 2024-04-15 DIAGNOSIS — M54.2 CERVICALGIA: Primary | ICD-10-CM

## 2024-04-15 DIAGNOSIS — M50.30 DDD (DEGENERATIVE DISC DISEASE), CERVICAL: ICD-10-CM

## 2024-04-15 DIAGNOSIS — M47.812 CERVICAL SPONDYLOSIS: ICD-10-CM

## 2024-04-15 PROCEDURE — 97140 MANUAL THERAPY 1/> REGIONS: CPT | Performed by: PHYSICAL THERAPIST

## 2024-04-15 PROCEDURE — 97162 PT EVAL MOD COMPLEX 30 MIN: CPT | Performed by: PHYSICAL THERAPIST
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
## 2024-04-18 ENCOUNTER — TREATMENT (OUTPATIENT)
Dept: PHYSICAL THERAPY | Facility: CLINIC | Age: 74
End: 2024-04-18
Payer: MEDICARE

## 2024-04-18 DIAGNOSIS — M54.2 CERVICALGIA: Primary | ICD-10-CM

## 2024-04-18 DIAGNOSIS — M25.512 ACUTE PAIN OF LEFT SHOULDER: ICD-10-CM

## 2024-04-18 DIAGNOSIS — M47.812 CERVICAL SPONDYLOSIS: ICD-10-CM

## 2024-04-18 DIAGNOSIS — M50.30 DDD (DEGENERATIVE DISC DISEASE), CERVICAL: ICD-10-CM

## 2024-04-18 PROCEDURE — 97140 MANUAL THERAPY 1/> REGIONS: CPT | Performed by: PHYSICAL THERAPIST

## 2024-04-18 NOTE — PROGRESS NOTES
Physical Therapy Treatment Note  [address] [place] [address]    Patient: Liudmila Ga                                                 Visit Date: 2024  :     1950    Referring practitioner:    Bayron Mooney*  Date of Initial Visit:          Type: THERAPY  Noted: 4/15/2024    Patient seen for 2 sessions    Visit Diagnoses:    ICD-10-CM ICD-9-CM   1. Cervicalgia  M54.2 723.1   2. Acute pain of left shoulder  M25.512 719.41   3. DDD (degenerative disc disease), cervical  M50.30 722.4   4. Cervical spondylosis  M47.812 721.0     SUBJECTIVE     Subjective: States she is having some pain on the L side of her neck, and her shoulder has some discomfort when she raises it overhead.      PAIN: 0-1/10       OBJECTIVE     Objective     Manual Therapy     93123  Comments   B UT/LS stretches      Cx distraction/suboccipital release      LUE LAD      STM to B UT/LS     LUE PROM w/ perturbations      Seated pec stretch     Timed Minutes 40       Therapy Education/Self Care [id number]   Education offered today HEP  Anatomy of problem   [employer] Code Access Code: [email or web address]  URL: [email or web address]   Ongoing HEP      Date: 04/15/2024  Prepared by: Gus Newman     Exercises  - Shoulder External Rotation and Scapular Retraction  - 2 x daily - 7 x weekly - 2 sets - 10 reps  - Seated Passive Cervical Retraction  - 2 x daily - 7 x weekly - 2 sets - 10 reps  - Shoulder External Rotation and Scapular Retraction with Resistance  - 2 x daily - 7 x weekly - 2 sets - 10 reps   Timed Minutes         Total Timed Treatment:     40   mins  Total Time of Visit:             40   mins         ASSESSMENT/PLAN     GOALS  Goals                                                    Progress Note due by 24                                                                Recert due by 24   STG by: 3 weeks Comments Date Status   Improve mobility  through thoracic and CT junction         Decreased muscle guarding  throughout neck and shoulder girdle          Improve left shoulder MMT grossly 4+/5 without pain with elevation                   LTG by: 6 weeks         Reports no radicular symptoms for a week         Improve cervical rotation los to 60 with no pain         Report no neck/shoulder pain except occasional minor twinges no more than 2-3/10         Understands improved ergonomics for work and HEP for flexibility and posture          Improve NDI score to 10% or better                       Assessment/Plan     ASSESSMENT:   No goals assessed d/t being first visit after eval. Pt reported no pain when siting still, but does ave tightness from her Cx into her shoulder and down her L scapula. She noted increased discomfort when raising her LUE overhead. She presented with increased Cx tightness L>R, with TP activity in her L UT/LS/rhomboids. S reported decreased tightness in her Cx and shoulder following treatment.    PLAN:   Focus early on pain relief with soft tissue work and other modalities, including potentially dry needling. Manual therapy used for mobilizations of the spine and upper thoracic and flexibility through the upper girdle. Progress with stability for posture and the shoulder girdle and progress HEP for the same     SIGNATURE: Cedric Allen PTA, [place] License #: [id number]  Electronically Signed on 4/18/2024        [address] [place] [address] [address]  [place] [address]  [phone]

## 2024-04-30 ENCOUNTER — TREATMENT (OUTPATIENT)
Dept: PHYSICAL THERAPY | Facility: CLINIC | Age: 74
End: 2024-04-30
Payer: MEDICARE

## 2024-04-30 DIAGNOSIS — M25.512 ACUTE PAIN OF LEFT SHOULDER: ICD-10-CM

## 2024-04-30 DIAGNOSIS — M54.2 CERVICALGIA: Primary | ICD-10-CM

## 2024-04-30 DIAGNOSIS — M47.812 CERVICAL SPONDYLOSIS: ICD-10-CM

## 2024-04-30 DIAGNOSIS — M50.30 DDD (DEGENERATIVE DISC DISEASE), CERVICAL: ICD-10-CM

## 2024-04-30 PROCEDURE — 97110 THERAPEUTIC EXERCISES: CPT | Performed by: PHYSICAL THERAPIST

## 2024-04-30 PROCEDURE — 97140 MANUAL THERAPY 1/> REGIONS: CPT | Performed by: PHYSICAL THERAPIST

## 2024-04-30 NOTE — PROGRESS NOTES
Physical Therapy Treatment Note  [address] [place] [address]    Patient: Liudmila Ga                                                 Visit Date: 2024  :     1950    Referring practitioner:    Bayron Mooney*  Date of Initial Visit:          Type: THERAPY  Noted: 4/15/2024    Patient seen for 3 sessions    Visit Diagnoses:    ICD-10-CM ICD-9-CM   1. Cervicalgia  M54.2 723.1   2. Acute pain of left shoulder  M25.512 719.41   3. DDD (degenerative disc disease), cervical  M50.30 722.4   4. Cervical spondylosis  M47.812 721.0     SUBJECTIVE     Subjective: States she hurt yesterday with the weather changing, but she feels better today. Notes she still has some tightness in her neck, and her shoulder still gives her trouble.     PAIN: 1-2/10       OBJECTIVE     Objective     Therapeutic Exercises    28188 Units Comments   Seated BUE phasic R 2x10    Seated rows Rx20    Seated Tx rotation w/ white bar X10 ea              Timed Minutes 8       Manual Therapy     09633  Comments   B UT/LS stretches      Cx distraction/suboccipital release     IASTM w/ Powerboost L1 ball B UT/LS/rhomboids    STM to B UT/LS     LUE PROM w/ perturbations      Seated pec stretch     Timed Minutes 32       Therapy Education/Self Care 13579   Education offered today HEP  Anatomy of problem   Nan Code Access Code: [email or web address]  URL: [email or web address]   Ongoing HEP      Date: 04/15/2024  Prepared by: Gus Newman     Exercises  - Shoulder External Rotation and Scapular Retraction  - 2 x daily - 7 x weekly - 2 sets - 10 reps  - Seated Passive Cervical Retraction  - 2 x daily - 7 x weekly - 2 sets - 10 reps  - Shoulder External Rotation and Scapular Retraction with Resistance  - 2 x daily - 7 x weekly - 2 sets - 10 reps   Timed Minutes         Total Timed Treatment:     40   mins  Total Time of Visit:             40   mins          ASSESSMENT/PLAN     GOALS  Goals                                                    Progress Note due by 5/14/24                                                                Recert due by 7/13/24   STG by: 3 weeks Comments Date Status   Improve mobility through thoracic and CT junction         Decreased muscle guarding  throughout neck and shoulder girdle          Improve left shoulder MMT grossly 4+/5 without pain with elevation                   LTG by: 6 weeks         Reports no radicular symptoms for a week         Improve cervical rotation los to 60 with no pain         Report no neck/shoulder pain except occasional minor twinges no more than 2-3/10         Understands improved ergonomics for work and HEP for flexibility and posture          Improve NDI score to 10% or better                       Assessment/Plan     ASSESSMENT:   Pt reported no pain today, noting she does still have the tightness from her Cx down into her B scapulae. She noted no discomfort during exercises, and presented with cont tightness and TP activity in her L UT/LS/rhomboids. She reported decreased tightness in her Cx and shoulder following treatment, with improved Cx rotation.    PLAN:   Focus early on pain relief with soft tissue work and other modalities, including potentially dry needling. Manual therapy used for mobilizations of the spine and upper thoracic and flexibility through the upper girdle. Progress with stability for posture and the shoulder girdle and progress HEP for the same     SIGNATURE: Cedric Allen PTA, [place] License #: [id number]  Electronically Signed on 4/30/2024        [address]  [place] [address]  [phone]

## 2024-05-02 ENCOUNTER — TREATMENT (OUTPATIENT)
Dept: PHYSICAL THERAPY | Facility: CLINIC | Age: 74
End: 2024-05-02
Payer: MEDICARE

## 2024-05-02 DIAGNOSIS — M54.2 CERVICALGIA: Primary | ICD-10-CM

## 2024-05-02 DIAGNOSIS — M47.812 CERVICAL SPONDYLOSIS: ICD-10-CM

## 2024-05-02 DIAGNOSIS — M50.30 DDD (DEGENERATIVE DISC DISEASE), CERVICAL: ICD-10-CM

## 2024-05-02 DIAGNOSIS — M25.512 ACUTE PAIN OF LEFT SHOULDER: ICD-10-CM

## 2024-05-02 PROCEDURE — 97110 THERAPEUTIC EXERCISES: CPT | Performed by: PHYSICAL THERAPIST

## 2024-05-02 PROCEDURE — 97140 MANUAL THERAPY 1/> REGIONS: CPT | Performed by: PHYSICAL THERAPIST

## 2024-05-02 NOTE — PROGRESS NOTES
Physical Therapy Treatment Note  [address] [place] [address]    Patient: Liudmila Ga                                                 Visit Date: 2024  :     1950    Referring practitioner:    Bayron Mooney*  Date of Initial Visit:          Type: THERAPY  Noted: 4/15/2024    Patient seen for 4 sessions    Visit Diagnoses:    ICD-10-CM ICD-9-CM   1. Cervicalgia  M54.2 723.1   2. Acute pain of left shoulder  M25.512 719.41   3. DDD (degenerative disc disease), cervical  M50.30 722.4   4. Cervical spondylosis  M47.812 721.0     SUBJECTIVE     Subjective: States her neck is not bothering her today, noting she still has the tightness though.      PAIN: 1/10       OBJECTIVE     Objective     Therapeutic Exercises    81447 Units Comments   Seated lat pulldown R x20    Seated rows Rx20    Open books X15 ea    Seated Tx ext over bolster in chair 3 min    Timed Minutes 11       Manual Therapy     69158  Comments   B UT/LS stretches      Cx distraction/suboccipital release     IASTM w/ Powerboost L1 ball B UT/LS/rhomboids    STM to B UT/LS     LUE PROM w/ perturbations      Seated pec stretch     Timed Minutes 30       Therapy Education/Self Care 79327   Education offered today HEP  Anatomy of problem   [employer] Code Access Code: [email or web address]  URL: [email or web address]   Ongoing HEP      Date: 04/15/2024  Prepared by: Gus Newman     Exercises  - Shoulder External Rotation and Scapular Retraction  - 2 x daily - 7 x weekly - 2 sets - 10 reps  - Seated Passive Cervical Retraction  - 2 x daily - 7 x weekly - 2 sets - 10 reps  - Shoulder External Rotation and Scapular Retraction with Resistance  - 2 x daily - 7 x weekly - 2 sets - 10 reps   Timed Minutes         Total Timed Treatment:     41   mins  Total Time of Visit:             41   mins         ASSESSMENT/PLAN     GOALS  Goals                                                     Progress Note due by 5/14/24                                                                Recert due by 7/13/24   STG by: 3 weeks Comments Date Status   Improve mobility through thoracic and CT junction         Decreased muscle guarding  throughout neck and shoulder girdle          Improve left shoulder MMT grossly 4+/5 without pain with elevation                   LTG by: 6 weeks         Reports no radicular symptoms for a week         Improve cervical rotation los to 60 with no pain         Report no neck/shoulder pain except occasional minor twinges no more than 2-3/10         Understands improved ergonomics for work and HEP for flexibility and posture          Improve NDI score to 10% or better                       Assessment/Plan     ASSESSMENT:   Pt reported no pain today, but had a little discomfort after last visit d/t not exercising in a while. She noted the tightness from her Cx down into her B scapulae is not as bad but is still limited in some motions. She noted no discomfort during exercises, and presented with cont tightness and TP activity in her L UT/LS/rhomboids. She reported decreased tightness in her Cx following treatment, with improved Cx rotation and EXT.    PLAN:   Focus early on pain relief with soft tissue work and other modalities, including potentially dry needling. Manual therapy used for mobilizations of the spine and upper thoracic and flexibility through the upper girdle. Progress with stability for posture and the shoulder girdle and progress HEP for the same     SIGNATURE: Cedric Allen [facility], [place] License #: [id number]  Electronically Signed on 5/2/2024        [address] [place] [address] [place] [address]  [phone]

## 2024-05-07 ENCOUNTER — TREATMENT (OUTPATIENT)
Dept: PHYSICAL THERAPY | Facility: CLINIC | Age: 74
End: 2024-05-07
Payer: MEDICARE

## 2024-05-07 DIAGNOSIS — M54.2 CERVICALGIA: Primary | ICD-10-CM

## 2024-05-07 DIAGNOSIS — M50.30 DDD (DEGENERATIVE DISC DISEASE), CERVICAL: ICD-10-CM

## 2024-05-07 DIAGNOSIS — M25.512 ACUTE PAIN OF LEFT SHOULDER: ICD-10-CM

## 2024-05-07 DIAGNOSIS — M47.812 CERVICAL SPONDYLOSIS: ICD-10-CM

## 2024-05-07 PROCEDURE — 97140 MANUAL THERAPY 1/> REGIONS: CPT | Performed by: PHYSICAL THERAPIST

## 2024-05-09 ENCOUNTER — TREATMENT (OUTPATIENT)
Dept: PHYSICAL THERAPY | Facility: CLINIC | Age: 74
End: 2024-05-09
Payer: MEDICARE

## 2024-05-09 DIAGNOSIS — M50.30 DDD (DEGENERATIVE DISC DISEASE), CERVICAL: ICD-10-CM

## 2024-05-09 DIAGNOSIS — M54.2 CERVICALGIA: Primary | ICD-10-CM

## 2024-05-09 DIAGNOSIS — M47.812 CERVICAL SPONDYLOSIS: ICD-10-CM

## 2024-05-09 DIAGNOSIS — M25.512 ACUTE PAIN OF LEFT SHOULDER: ICD-10-CM

## 2024-05-09 PROCEDURE — 97110 THERAPEUTIC EXERCISES: CPT | Performed by: PHYSICAL THERAPIST

## 2024-05-09 PROCEDURE — 97140 MANUAL THERAPY 1/> REGIONS: CPT | Performed by: PHYSICAL THERAPIST

## 2024-05-14 ENCOUNTER — TREATMENT (OUTPATIENT)
Dept: PHYSICAL THERAPY | Facility: CLINIC | Age: 74
End: 2024-05-14
Payer: MEDICARE

## 2024-05-14 DIAGNOSIS — M50.30 DDD (DEGENERATIVE DISC DISEASE), CERVICAL: ICD-10-CM

## 2024-05-14 DIAGNOSIS — M47.812 CERVICAL SPONDYLOSIS: ICD-10-CM

## 2024-05-14 DIAGNOSIS — M54.2 CERVICALGIA: Primary | ICD-10-CM

## 2024-05-14 DIAGNOSIS — M25.512 ACUTE PAIN OF LEFT SHOULDER: ICD-10-CM

## 2024-05-14 PROCEDURE — 97110 THERAPEUTIC EXERCISES: CPT | Performed by: PHYSICAL THERAPIST

## 2024-05-14 PROCEDURE — 97140 MANUAL THERAPY 1/> REGIONS: CPT | Performed by: PHYSICAL THERAPIST

## 2024-05-14 NOTE — PROGRESS NOTES
Physical Therapy Treatment Note  [address] [place] [address]    Patient: Liudmial Ga                                                 Visit Date: 2024  :     1950    Referring practitioner:    Bayron Mooney*  Date of Initial Visit:          Type: THERAPY  Noted: 4/15/2024    Patient seen for 7 sessions    Visit Diagnoses:    ICD-10-CM ICD-9-CM   1. Cervicalgia  M54.2 723.1   2. Acute pain of left shoulder  M25.512 719.41   3. DDD (degenerative disc disease), cervical  M50.30 722.4   4. Cervical spondylosis  M47.812 721.0     SUBJECTIVE     Subjective: States she feels stiff and tight d/t the weather, but otherwise has been doing well. Notes she still has some tightness with certain motions.     PAIN: 2/10 with movement       OBJECTIVE     Objective     Therapeutic Exercises    21163 Units Comments   Open books  Rx15 ea    Seated pec stretch w/ bolster     Seated sh hor ABD Rx15    L bicep curls 2# DB x20    Seated sh flex holds 2# DB 6x10 s         Timed Minutes 23       Manual Therapy     15874  Comments   B UT/LS stretches      Cx distraction/suboccipital release      STM to B UT/LS/Cx & Tx suresh      STM to L sh globally     Timed Minutes 19       Therapy Education/Self Care 78525   Education offered today HEP  Anatomy of problem   Med[employer] Code Access Code: [email or web address]  URL: [email or web address]   Ongoing HEP      Date: 04/15/2024  Prepared by: Gus Newman     Exercises  - Shoulder External Rotation and Scapular Retraction  - 2 x daily - 7 x weekly - 2 sets - 10 reps  - Seated Passive Cervical Retraction  - 2 x daily - 7 x weekly - 2 sets - 10 reps  - Shoulder External Rotation and Scapular Retraction with Resistance  - 2 x daily - 7 x weekly - 2 sets - 10 reps   Timed Minutes         Total Timed Treatment:     42   mins  Total Time of Visit:             42   mins         ASSESSMENT/PLAN     GOALS  Goals                                                     Progress Note due by 6/7/24                                                                Recert due by 7/13/24   STG by: 3 weeks Comments Date Status   Improve mobility through thoracic and CT junction  still has some tightness with different motions 5/9 ongoing   Decreased muscle guarding  throughout neck and shoulder girdle  some cont tightness and TP activity 5/9 ongoing    Improve left shoulder MMT grossly 4+/5 without pain with elevation  FLEX: 4/5; pain   ER: 4/5   IR: 3+/5; pain   ABD: 3+/5; pain   EXT: 4/5  5/9 ongoing             LTG by: 6 weeks         Reports no radicular symptoms for a week  has some achiness in her L wrist that may be from arthritis  5/9 ongoing   Improve cervical rotation los to 60 with no pain  R: 69 deg   L: 64 deg; slight pain at end range 5/9 Partially met   Report no neck/shoulder pain except occasional minor twinges no more than 2-3/10  Has gotten up to 3/10 when aggravated 5/9 progressing   Understands improved ergonomics for work and HEP for flexibility and posture  Performs a few times a week 5/9 ongoing    Improve NDI score to 10% or better  14/50 = 28%  5/9 ongoing                 Assessment/Plan     ASSESSMENT: Pt reported some tightness and stiffness today d/t the weather, noting she has not had any real pain in a while. She noted tightness r pulling sensation in her L deltoid when raising her LUE into ABD. She presented with cont tightness and TP activity in her B UT/LS/rhomboids/Tx suresh L>R, and reported improved Cx mobility following treatment.    PLAN:   Focus early on pain relief with soft tissue work and other modalities, including potentially dry needling. Manual therapy used for mobilizations of the spine and upper thoracic and flexibility through the upper girdle. Progress with stability for posture and the shoulder girdle and progress HEP for the same     SIGNATURE: Cedric Allen, PTA, [place] License #:  [id number]  Electronically Signed on 5/14/2024        [address] [place] [address]  [phone]

## 2024-05-16 ENCOUNTER — TREATMENT (OUTPATIENT)
Dept: PHYSICAL THERAPY | Facility: CLINIC | Age: 74
End: 2024-05-16
Payer: MEDICARE

## 2024-05-16 DIAGNOSIS — M54.2 CERVICALGIA: Primary | ICD-10-CM

## 2024-05-16 DIAGNOSIS — M47.812 CERVICAL SPONDYLOSIS: ICD-10-CM

## 2024-05-16 DIAGNOSIS — M25.512 ACUTE PAIN OF LEFT SHOULDER: ICD-10-CM

## 2024-05-16 DIAGNOSIS — M50.30 DDD (DEGENERATIVE DISC DISEASE), CERVICAL: ICD-10-CM

## 2024-05-16 PROCEDURE — 97140 MANUAL THERAPY 1/> REGIONS: CPT | Performed by: PHYSICAL THERAPIST

## 2024-05-16 NOTE — PROGRESS NOTES
Physical Therapy Treatment Note  [address] [place] [address]    Patient: Liudmila Ga                                                 Visit Date: 2024  :     1950    Referring practitioner:    Bayron Mooney*  Date of Initial Visit:          Type: THERAPY  Noted: 4/15/2024    Patient seen for 8 sessions    Visit Diagnoses:    ICD-10-CM ICD-9-CM   1. Cervicalgia  M54.2 723.1   2. Acute pain of left shoulder  M25.512 719.41   3. DDD (degenerative disc disease), cervical  M50.30 722.4   4. Cervical spondylosis  M47.812 721.0     SUBJECTIVE     Subjective: States her neck and left shoulder pain is still there. It's bothered her more today than other days.      PAIN: 2/10 with movement       OBJECTIVE     Objective     Manual Therapy     [id number]  Comments   Prone thoracic ext mobs Foam roll, manual OP   Prone CT ext mobs    Supine CT ext and facet upglide mobs los    Right GH post mob            Timed Minutes 45       Therapy Education/Self Care 22880   Education offered today Emphasis on postural stretching for pects and cervical/scap retraction   [email or web address] Code Access Code: [email or web address]  URL: [email or web address]   Ongoing HEP      Date: 04/15/2024  Prepared by: Gus Newman     Exercises  - Shoulder External Rotation and Scapular Retraction  - 2 x daily - 7 x weekly - 2 sets - 10 reps  - Seated Passive Cervical Retraction  - 2 x daily - 7 x weekly - 2 sets - 10 reps  - Shoulder External Rotation and Scapular Retraction with Resistance  - 2 x daily - 7 x weekly - 2 sets - 10 reps   Timed Minutes         Total Timed Treatment:     45   mins  Total Time of Visit:             45   mins         ASSESSMENT/PLAN     GOALS  Goals                                                    Progress Note due by 24                                                                Recert due by 24   STG by: 3 weeks Comments Date  Status   Improve mobility through thoracic and CT junction  still has some tightness with different motions 5/9 ongoing   Decreased muscle guarding  throughout neck and shoulder girdle  some cont tightness and TP activity 5/9 ongoing    Improve left shoulder MMT grossly 4+/5 without pain with elevation  FLEX: 4/5; pain   ER: 4/5   IR: 3+/5; pain   ABD: 3+/5; pain   EXT: 4/5  5/9 ongoing             LTG by: 6 weeks         Reports no radicular symptoms for a week  has some achiness in her L wrist that may be from arthritis  5/9 ongoing   Improve cervical rotation los to 60 with no pain  R: 69 deg   L: 64 deg; slight pain at end range 5/9 Partially met   Report no neck/shoulder pain except occasional minor twinges no more than 2-3/10  Has gotten up to 3/10 when aggravated 5/9 progressing   Understands improved ergonomics for work and HEP for flexibility and posture Emphasis on flexibility more throughout the day 5/16 ongoing    Improve NDI score to 10% or better  14/50 = 28%  5/9 ongoing                 Assessment/Plan     ASSESSMENT: She is still having the facet joint pain and left shoulder joint impingement. Her thoracic kyphosis still is stiff leading the hyperextension of the lower neck.     PLAN:   Focus on mobility of CT and thoracic extension and pect flexibility.     SIGNATURE: Gus Newman, PT, [place] License #: [id number]  Electronically Signed on 5/16/2024        [address] [place] [address]. [address]  [phone]

## 2024-05-21 ENCOUNTER — TELEPHONE (OUTPATIENT)
Dept: PHYSICAL THERAPY | Facility: CLINIC | Age: 74
End: 2024-05-21

## 2024-05-23 ENCOUNTER — TREATMENT (OUTPATIENT)
Dept: PHYSICAL THERAPY | Facility: CLINIC | Age: 74
End: 2024-05-23
Payer: MEDICARE

## 2024-05-23 DIAGNOSIS — M25.512 ACUTE PAIN OF LEFT SHOULDER: ICD-10-CM

## 2024-05-23 DIAGNOSIS — M47.812 CERVICAL SPONDYLOSIS: ICD-10-CM

## 2024-05-23 DIAGNOSIS — M50.30 DDD (DEGENERATIVE DISC DISEASE), CERVICAL: ICD-10-CM

## 2024-05-23 DIAGNOSIS — M54.2 CERVICALGIA: Primary | ICD-10-CM

## 2024-05-23 PROCEDURE — 97110 THERAPEUTIC EXERCISES: CPT | Performed by: PHYSICAL THERAPIST

## 2024-05-23 PROCEDURE — 97140 MANUAL THERAPY 1/> REGIONS: CPT | Performed by: PHYSICAL THERAPIST

## 2024-05-23 NOTE — PROGRESS NOTES
Physical Therapy Treatment Note  [address], [place] [address]    Patient: Liudmila Ga                                                 Visit Date: 2024  :     1950    Referring practitioner:    Bayron Mooney*  Date of Initial Visit:          Type: THERAPY  Noted: 4/15/2024    Patient seen for 9 sessions    Visit Diagnoses:    ICD-10-CM ICD-9-CM   1. Cervicalgia  M54.2 723.1   2. Acute pain of left shoulder  M25.512 719.41   3. DDD (degenerative disc disease), cervical  M50.30 722.4   4. Cervical spondylosis  M47.812 721.0     SUBJECTIVE     Subjective: States she feels a little stiff today, noting the weather is not helping. Notes her shoulder has been feeling about the same.      PAIN: 1-2/10 with movement       OBJECTIVE     Objective     Therapeutic Exercises    79519 Units Comments   Open books  G 2x10 ea     Seated pec stretch w/ bolster       Seated rows G 2x20    Supine sh hor ABD R 2x15     Supine BUE phasic G 2x10    Supine LUE chest press 2# 2x10    Supine L sh flex 2# x10            Timed Minutes 24       Manual Therapy     53744  Comments   STM to B UT/LS    Cx traction, suboccipital release    TPR to L UT/LS        Timed Minutes 20       Therapy Education/Self Care 80136   Education offered today Emphasis on postural stretching for pects and cervical/scap retraction   Nan Code Access Code: [email or web address]  URL: [email or web address]   Ongoing HEP      Date: 04/15/2024  Prepared by: Gus Newman     Exercises  - Shoulder External Rotation and Scapular Retraction  - 2 x daily - 7 x weekly - 2 sets - 10 reps  - Seated Passive Cervical Retraction  - 2 x daily - 7 x weekly - 2 sets - 10 reps  - Shoulder External Rotation and Scapular Retraction with Resistance  - 2 x daily - 7 x weekly - 2 sets - 10 reps   Timed Minutes         Total Timed Treatment:     44   mins  Total Time of Visit:             44    mins         ASSESSMENT/PLAN     GOALS  Goals                                                    Progress Note due by 6/7/24                                                                Recert due by 7/13/24   STG by: 3 weeks Comments Date Status   Improve mobility through thoracic and CT junction  still has some tightness with different motions 5/9 ongoing   Decreased muscle guarding  throughout neck and shoulder girdle  some cont tightness and TP activity 5/9 ongoing    Improve left shoulder MMT grossly 4+/5 without pain with elevation  FLEX: 4/5; pain   ER: 4/5   IR: 3+/5; pain   ABD: 3+/5; pain   EXT: 4/5  5/9 ongoing             LTG by: 6 weeks         Reports no radicular symptoms for a week  has some achiness in her L wrist that may be from arthritis  5/9 ongoing   Improve cervical rotation los to 60 with no pain  R: 69 deg   L: 64 deg; slight pain at end range 5/9 Partially met   Report no neck/shoulder pain except occasional minor twinges no more than 2-3/10  Has gotten up to 3/10 when aggravated 5/9 progressing   Understands improved ergonomics for work and HEP for flexibility and posture Emphasis on flexibility more throughout the day 5/16 ongoing    Improve NDI score to 10% or better  14/50 = 28%  5/9 ongoing                 Assessment/Plan     ASSESSMENT: Pt reported some increased tightness and stiffness today, noting the weather has been causing it. She noted no discomfort during treatment, but reported some BUE fatigue following few exercises. She presented with some increased Cx tightness L>R, with some TP activity in her L UT/LS. She reported decreased pain and tightness following treatment.    PLAN:   Focus on mobility of CT and thoracic extension and pect flexibility.     SIGNATURE: Cedric Allen PTA, [place] License #: [id number]  Electronically Signed on 5/23/2024        [address] [place] [address]. [address]  [phone]

## 2024-05-28 ENCOUNTER — TELEPHONE (OUTPATIENT)
Dept: ORTHOPEDICS | Facility: OTHER | Age: 74
End: 2024-05-28
Payer: MEDICARE

## 2024-05-28 NOTE — TELEPHONE ENCOUNTER
PATIENT IS NOT FEELING WELL, CANCELLED TODAY. PATIENT CANCELLED THURSDAY'S APPOINT AS WELL, STATING SHE DON'T FEEL IT IS NECESSARY.

## 2024-05-30 DIAGNOSIS — Z87.891 PERSONAL HISTORY OF NICOTINE DEPENDENCE: Primary | ICD-10-CM

## 2024-06-11 RX ORDER — DILTIAZEM HYDROCHLORIDE 120 MG/1
TABLET, FILM COATED ORAL
COMMUNITY
Start: 2024-03-28

## 2024-06-11 RX ORDER — HYDROCODONE BITARTRATE AND ACETAMINOPHEN 5; 325 MG/1; MG/1
TABLET ORAL
COMMUNITY
Start: 2024-04-24

## 2024-06-11 RX ORDER — POTASSIUM CITRATE 10 MEQ/1
10 TABLET, EXTENDED RELEASE ORAL EVERY 12 HOURS SCHEDULED
COMMUNITY

## 2024-06-28 PROBLEM — J44.89 ASTHMA-COPD OVERLAP SYNDROME: Chronic | Status: ACTIVE | Noted: 2024-06-28

## 2024-06-28 PROBLEM — Z87.891 PERSONAL HISTORY OF NICOTINE DEPENDENCE: Status: ACTIVE | Noted: 2024-06-28

## 2024-06-28 PROBLEM — J44.9 STAGE 2 MODERATE COPD BY GOLD CLASSIFICATION: Chronic | Status: ACTIVE | Noted: 2024-06-28

## 2024-06-28 NOTE — PROGRESS NOTES
" ALTHEA Fernandez  [facility]   Pulmonary and Critical Care  [address] [place] [address]  Phone: [phone]  Fax: [phone]           Chief Complaint  COPD    Subjective    History of Present Illness     Liudmila Ga presents to [facility] PULMONARY & CRITICAL CARE MEDICINE   History of Present Illness  Ms. Ga is a pleasant 73 year old female patient with known asthma/ copd overlap, emphysema, GERD, Depression, arthritis, Hypothyroid, iron deficiency anemia, Raynaud disease, seasonal allergies and vitamin D deficiency. She is a former of smoker. She uses the albuterol HFA as needed. She denies fever, chills or night sweats. She has Dyspnea that is stable. She denies night time awakenings.  She has no limitations to doing her ADLs.  Her PFT showed moderate restriction and normal diffusion. Her LDCT showed no suspicious nodules.          Objective   Vital Signs:   /74   Pulse 60   Ht 160 cm (63\")   Wt 60.8 kg (134 lb)   SpO2 95% Comment: RA  BMI 23.74 kg/m²     Physical Exam  Vitals reviewed.   Constitutional:       Appearance: Normal appearance.   Cardiovascular:      Rate and Rhythm: Normal rate and regular rhythm.   Pulmonary:      Effort: Pulmonary effort is normal.      Breath sounds: Normal breath sounds.   Neurological:      General: No focal deficit present.      Mental Status: She is alert and oriented to person, place, and time.   Psychiatric:         Mood and Affect: Mood normal.         Behavior: Behavior normal.          Result Review :  The following data was reviewed by: ALTHEA Fernandez on 07/01/2024:    Data reviewed : Radiologic studies CT    My interpretation of imaging: As in HPI  My interpretation of labs: None  CT Chest Low Dose Cancer Screening WO (07/01/2024 10:14)   PFT Values          7/1/2024    13:30   Pre Drug PFT Results   FVC 60   FEV1 58   FEF 25-75% 54   FEV1/FVC 75   Other Tests PFT Results "   DLCO 94   D/VAsb 122     My interpretation of the PFT : as below     Results for orders placed in visit on 07/01/24    Spirometry with Diffusion Capacity    Narrative  Spirometry with Diffusion Capacity    Performed by: Anette Nolan, RRT  Authorized by: Sheree Lopez APRN  Pre Drug % Predicted  FVC: 60%  FEV1: 58%  FEF 25-75%: 54%  FEV1/FVC: 75%  DLCO: 94%  D/VAsb: 122%    Interpretation  Spirometry  Spirometry shows moderate obstruction.  Review of FVL curve  Patient's effort is normal.  Diffusion Capacity  The patient's diffusion capacity is normal.  Diffusion capacity is normal when corrected for alveolar volume.  Overall comments: Compared to prior PFT she has moved from moderate obstruction to moderate restriction on spirometry and diffusion remains normal.      Results for orders placed during the hospital encounter of 04/26/23    Full Pulmonary Function Test With Bronchodilator    Narrative  [facility] - [facility]    [address] [facility]  [address]  [phone]    Patient : Liudmila Ga  MRN : [id number]  CSN : [id number]  Pulmonologist : Dany Ferreira MD  Date : 4/26/2023    ______________________________________________________________________    Interpretation :  1.  Spirometry is consistent with a moderate obstructive ventilatory defect.  2.  There is significant improvement in spirometry postbronchodilator but a moderate obstructive ventilatory defect is still present.  3.  Lung volumes reveal a decrease in vital capacity secondary obstruction and hyperinflation is also present.  There is also a decrease in inspiratory capacity.  4.  There is a mild diffusion impairment which when corrected for alveolar volume is normalized.      Dany Ferreira MD          Assessment and Plan   Diagnoses and all orders for this visit:    1. Asthma-COPD overlap syndrome (Primary)  Overview:  Moderate COPD with mild intermittent asthma     Orders:  -      Alpha - 1 - Antitrypsin; Future  -     Spirometry with Diffusion Capacity    2. Personal history of nicotine dependence  Overview:  Former, quit Jan 2012, 45 pack year history       3. Pulmonary emphysema, unspecified emphysema type    Other orders  -     albuterol sulfate  (90 Base) MCG/ACT inhaler; Inhale 2 puffs Every 4 (Four) Hours As Needed for Wheezing or Shortness of Air.  Dispense: 18 g; Refill: 3      Continue as needed albuterol HFA with a prescription sent into pharmacy.  Reviewed low-dose CT and pulmonary function study with patient today.  No new complaints or concerns.  Plan for 1 year follow-up with low-dose CT and a flow-volume loop with diffusion capacity.    Alpha 1: tested today   LDCT: Due 7/2/2025   Smoking Cessation: Former, quit Jan 2012   Vaccinations: Flu: Due fall        Follow Up   Return in about 1 year (around 7/1/2025) for CT-LD, FVL w DIF.  Patient was given instructions and counseling regarding her condition or for health maintenance advice. Please see specific information pulled into the AVS if appropriate.     Sheree Lopez, APRN  7/1/2024  15:38 CDT

## 2024-07-01 ENCOUNTER — OFFICE VISIT (OUTPATIENT)
Dept: PULMONOLOGY | Facility: CLINIC | Age: 74
End: 2024-07-01
Payer: MEDICARE

## 2024-07-01 ENCOUNTER — PROCEDURE VISIT (OUTPATIENT)
Dept: PULMONOLOGY | Facility: CLINIC | Age: 74
End: 2024-07-01
Payer: MEDICARE

## 2024-07-01 ENCOUNTER — HOSPITAL ENCOUNTER (OUTPATIENT)
Dept: CT IMAGING | Facility: HOSPITAL | Age: 74
Discharge: HOME | End: 2024-07-01
Admitting: NURSE PRACTITIONER
Payer: MEDICARE

## 2024-07-01 VITALS
SYSTOLIC BLOOD PRESSURE: 122 MMHG | BODY MASS INDEX: 23.74 KG/M2 | DIASTOLIC BLOOD PRESSURE: 74 MMHG | HEART RATE: 60 BPM | HEIGHT: 63 IN | OXYGEN SATURATION: 95 % | WEIGHT: 134 LBS

## 2024-07-01 DIAGNOSIS — J44.89 ASTHMA-COPD OVERLAP SYNDROME: Primary | ICD-10-CM

## 2024-07-01 DIAGNOSIS — Z87.891 PERSONAL HISTORY OF NICOTINE DEPENDENCE: ICD-10-CM

## 2024-07-01 DIAGNOSIS — J44.89 ASTHMA-COPD OVERLAP SYNDROME: Primary | Chronic | ICD-10-CM

## 2024-07-01 DIAGNOSIS — J43.9 PULMONARY EMPHYSEMA, UNSPECIFIED EMPHYSEMA TYPE: ICD-10-CM

## 2024-07-01 PROCEDURE — 1160F RVW MEDS BY RX/DR IN RCRD: CPT | Performed by: NURSE PRACTITIONER

## 2024-07-01 PROCEDURE — 1159F MED LIST DOCD IN RCRD: CPT | Performed by: NURSE PRACTITIONER

## 2024-07-01 PROCEDURE — 94729 DIFFUSING CAPACITY: CPT | Performed by: NURSE PRACTITIONER

## 2024-07-01 PROCEDURE — 94375 RESPIRATORY FLOW VOLUME LOOP: CPT | Performed by: NURSE PRACTITIONER

## 2024-07-01 PROCEDURE — 3074F SYST BP LT 130 MM HG: CPT | Performed by: NURSE PRACTITIONER

## 2024-07-01 PROCEDURE — 99214 OFFICE O/P EST MOD 30 MIN: CPT | Performed by: NURSE PRACTITIONER

## 2024-07-01 PROCEDURE — 71271 CT THORAX LUNG CANCER SCR C-: CPT

## 2024-07-01 PROCEDURE — 3078F DIAST BP <80 MM HG: CPT | Performed by: NURSE PRACTITIONER

## 2024-07-01 RX ORDER — ALBUTEROL SULFATE 90 UG/1
2 AEROSOL, METERED RESPIRATORY (INHALATION) EVERY 4 HOURS PRN
Qty: 18 G | Refills: 3 | Status: SHIPPED | OUTPATIENT
Start: 2024-07-01

## 2024-07-01 NOTE — PROCEDURES
Spirometry with Diffusion Capacity    Performed by: Anette Nolan, RRT  Authorized by: Sheree Lopez APRN     Pre Drug % Predicted    FVC: 60%   FEV1: 58%   FEF 25-75%: 54%   FEV1/FVC: 75%   DLCO: 94%   D/VAsb: 122%    Interpretation   Spirometry   Spirometry shows moderate obstruction.   Review of FVL curve   Patient's effort is normal.   Diffusion Capacity  The patient's diffusion capacity is normal.  Diffusion capacity is normal when corrected for alveolar volume.   Overall comments: Compared to prior PFT she has moved from moderate obstruction to moderate restriction on spirometry and diffusion remains normal.

## 2024-08-12 ENCOUNTER — TELEPHONE (OUTPATIENT)
Dept: PULMONOLOGY | Facility: CLINIC | Age: 74
End: 2024-08-12
Payer: MEDICARE

## 2024-08-12 NOTE — TELEPHONE ENCOUNTER
----- Message from Sheree Lopez sent at 8/9/2024  4:23 PM CDT -----  Please let patient know their alpha 1 results are normal with a MM result.

## 2024-10-23 ENCOUNTER — TELEPHONE (OUTPATIENT)
Age: 74
End: 2024-10-23

## 2024-10-23 NOTE — TELEPHONE ENCOUNTER
Pt is calling and saying that the shot that she got last time only lasted only 2 or 3 days she wants to know if she can see about maybe a gel shot

## 2024-10-25 ENCOUNTER — OFFICE VISIT (OUTPATIENT)
Dept: OBSTETRICS AND GYNECOLOGY | Age: 74
End: 2024-10-25
Payer: MEDICARE

## 2024-10-25 VITALS
WEIGHT: 140 LBS | BODY MASS INDEX: 24.8 KG/M2 | HEIGHT: 63 IN | SYSTOLIC BLOOD PRESSURE: 122 MMHG | DIASTOLIC BLOOD PRESSURE: 70 MMHG

## 2024-10-25 DIAGNOSIS — N99.3 VAGINAL VAULT PROLAPSE AFTER HYSTERECTOMY: ICD-10-CM

## 2024-10-25 DIAGNOSIS — Z90.710 S/P HYSTERECTOMY: Primary | ICD-10-CM

## 2024-10-25 DIAGNOSIS — N81.6 RECTOCELE: ICD-10-CM

## 2024-10-25 DIAGNOSIS — Z46.89 ENCOUNTER FOR FITTING AND ADJUSTMENT OF PESSARY: ICD-10-CM

## 2024-10-25 DIAGNOSIS — R15.9 INCONTINENCE OF FECES, UNSPECIFIED FECAL INCONTINENCE TYPE: ICD-10-CM

## 2024-10-25 RX ORDER — ALENDRONATE SODIUM 70 MG/1
TABLET ORAL
COMMUNITY
Start: 2024-10-07

## 2024-10-25 NOTE — PROGRESS NOTES
"Subjective   Chief Complaint   Patient presents with    Bladder Prolapse     Pt here today with c/o prolapse. Pt wanting to discuss pessary. Pt has had pessary in the past before her hyst and she voiced it worked well. Pt voices having bowel incontinence that doesn't happen all the time. Pt also voices having a possible hemorrhoid.  Pt voices no other concerns.      Liudmila Ga is a 73 y.o. year old .  No LMP recorded. Patient has had a hysterectomy.  She presents to be seen because she wants to be fitted with a pessary.  She reports using a pessary before her hysterectomy in , but has not needed one since that time.  Liudmila reports that she had to try several different ones, but eventually there was one that worked well for her.  At present, patient reports a rectocele and says that this is bothersome for her on days that bowel movements are difficult.  She is also having fecal incontinence of liquid stool.  Patient denies any difficulty emptying bladder.  She is having no urinary incontinence.     The following portions of the patient's history were reviewed and updated as appropriate:current medications and allergies    Social History    Tobacco Use      Smoking status: Former        Packs/day: 0.00        Years: 1 pack/day for 46.0 years (46.0 ttl pk-yrs)        Types: Cigarettes        Start date:         Quit date: 2012        Years since quittin.8      Smokeless tobacco: Never    Review of Systems   Constitutional:  Negative for activity change and unexpected weight change.   Respiratory:  Negative for shortness of breath.    Cardiovascular:  Negative for chest pain.   Genitourinary:  Positive for vaginal pain (pressure). Negative for dyspareunia, enuresis, frequency, pelvic pain, urgency, vaginal bleeding and vaginal discharge.        + stool trapping         Objective   /70   Ht 160 cm (63\")   Wt 63.5 kg (140 lb)   BMI 24.80 kg/m²     Physical Exam  Vitals and " nursing note reviewed.   Constitutional:       General: She is not in acute distress.     Appearance: Normal appearance. She is well-developed.   HENT:      Head: Normocephalic and atraumatic.   Neck:      Thyroid: No thyromegaly.   Pulmonary:      Effort: Pulmonary effort is normal.   Abdominal:      General: There is no distension.      Palpations: Abdomen is soft.      Tenderness: There is no abdominal tenderness.   Genitourinary:     General: Normal vulva.      Comments: Status post hysterectomy with poor vault support.  Grade 4 rectocele.  Grade 2 cystocele.  Vaginal mucosa pale with loss of rugae.  No urethral prolapse.  Patient fitted with a #4 cube pessary and reported it to be comfortable.  She had a mild awareness of its presence, but no discomfort.  The patient reported pessary to stay in place well with multiple different maneuvers in the office  Musculoskeletal:         General: Normal range of motion.      Cervical back: Normal range of motion.   Skin:     General: Skin is warm and dry.   Neurological:      Mental Status: She is alert and oriented to person, place, and time.   Psychiatric:         Mood and Affect: Mood normal.         Behavior: Behavior normal.         Thought Content: Thought content normal.         Judgment: Judgment normal.         Lab Review   No data reviewed    Imaging   No data reviewed     Assessment & Plan    Diagnoses and all orders for this visit:    1. S/P hysterectomy (Primary)    2. Rectocele  Comments:  grade IV    3. Encounter for fitting and adjustment of pessary: Patient fitted with a #4 cube pessary and reported to be comfortable.  She will follow-up in the office in 1 week    4. Vaginal vault prolapse after hysterectomy    5. Incontinence of feces, unspecified fecal incontinence type       This note was electronically signed.    Chloe Waggoner MD  October 25, 2024  09:12 CDT    Total time spent today with Liudmila  was 30 minutes (level 3).  Greater than 50% of the  time was spent coordinating care, answering her questions and counseling regarding pathophysiology of her presenting problem along with plans for any diagnositc work-up and treatment.

## 2024-10-30 ENCOUNTER — OFFICE VISIT (OUTPATIENT)
Dept: OBSTETRICS AND GYNECOLOGY | Age: 74
End: 2024-10-30
Payer: MEDICARE

## 2024-10-30 VITALS
BODY MASS INDEX: 24.8 KG/M2 | HEIGHT: 63 IN | SYSTOLIC BLOOD PRESSURE: 142 MMHG | DIASTOLIC BLOOD PRESSURE: 70 MMHG | WEIGHT: 140 LBS

## 2024-10-30 DIAGNOSIS — N99.3 VAGINAL VAULT PROLAPSE AFTER HYSTERECTOMY: ICD-10-CM

## 2024-10-30 DIAGNOSIS — Z46.89 ENCOUNTER FOR FITTING AND ADJUSTMENT OF PESSARY: ICD-10-CM

## 2024-10-30 DIAGNOSIS — Z90.710 S/P HYSTERECTOMY: Primary | ICD-10-CM

## 2024-10-30 DIAGNOSIS — N81.6 RECTOCELE: ICD-10-CM

## 2024-10-30 NOTE — PROGRESS NOTES
"Subjective   Chief Complaint   Patient presents with    Pessary Check     Pt here today for pessary check. Pt voices that pessary fell down after a bowel movement. Pt wanting to try another size.      Liudmila Ga is a 73 y.o. year old .  No LMP recorded. Patient has had a hysterectomy.  She presents to be seen for one week follow-up of a new #4 cube pessary that was placed last week.  Liudmila reports that it fell out after a bowel movement and is wanting to try a different size.  The cube pessary was not uncomfortable, but only stayed in for several hours    The following portions of the patient's history were reviewed and updated as appropriate:current medications and allergies    Social History    Tobacco Use      Smoking status: Former        Packs/day: 0.00        Years: 1 pack/day for 46.0 years (46.0 ttl pk-yrs)        Types: Cigarettes        Start date:         Quit date: 2012        Years since quittin.8      Smokeless tobacco: Never    Review of Systems   Constitutional:  Negative for activity change and unexpected weight change.   Respiratory:  Negative for shortness of breath.    Cardiovascular:  Negative for chest pain.   Gastrointestinal:  Negative for abdominal pain.   Genitourinary:  Positive for vaginal pain (pressure). Negative for dyspareunia, enuresis, frequency, pelvic pain, urgency, vaginal bleeding and vaginal discharge.        + stool trapping         Objective   /70   Ht 160 cm (63\")   Wt 63.5 kg (140 lb)   BMI 24.80 kg/m²     Physical Exam  Vitals and nursing note reviewed.   Constitutional:       General: She is not in acute distress.     Appearance: Normal appearance. She is well-developed.   HENT:      Head: Normocephalic and atraumatic.   Neck:      Thyroid: No thyromegaly.   Pulmonary:      Effort: Pulmonary effort is normal.   Abdominal:      General: There is no distension.      Palpations: Abdomen is soft.      Tenderness: There is no abdominal " tenderness.   Genitourinary:     General: Normal vulva.      Comments: Status post hysterectomy with poor vault support.  Grade 4 rectocele.  Grade 2 cystocele.  Vaginal mucosa pale with loss of rugae.  No urethral prolapse.  Patient fitted with a #5 ring with support pessary and reported it to be comfortable.  She had a mild awareness of its presence, but no discomfort.  The patient reported pessary to stay in place well with multiple different maneuvers in the office  Musculoskeletal:         General: Normal range of motion.      Cervical back: Normal range of motion.   Skin:     General: Skin is warm and dry.   Neurological:      Mental Status: She is alert and oriented to person, place, and time.   Psychiatric:         Mood and Affect: Mood normal.         Behavior: Behavior normal.         Thought Content: Thought content normal.         Judgment: Judgment normal.         Lab Review   No data reviewed    Imaging   No data reviewed     Assessment & Plan    Diagnoses and all orders for this visit:    1. S/P hysterectomy (Primary)    2. Rectocele    3. Encounter for fitting and adjustment of pessary: #4 cube pessary that was placed last week fell out after only several hours.  Today the patient was fitted with a #5 ring with support.  I would really like for the patient to have a #5 cube, but there are no #5 or #6 cube pessary is available right now (they are on backorder).  Patient Yolanda #5 ring with support was fitting well in relieving her pressure.  Patient will return to the office in 1 week    4. Vaginal vault prolapse after hysterectomy       This note was electronically signed.    Chloe Waggoner MD  October 30, 2024  13:16 CDT    Total time spent today with Liudmila  was 20 minutes (level 3).  Greater than 50% of the time was spent coordinating care, answering her questions and counseling regarding pathophysiology of her presenting problem along with plans for any diagnositc work-up and treatment.

## 2024-11-01 NOTE — TELEPHONE ENCOUNTER
Called only number in chart and this is not the Patients number. If she calls back please update!

## 2024-11-04 ENCOUNTER — TELEPHONE (OUTPATIENT)
Dept: PULMONOLOGY | Facility: CLINIC | Age: 74
End: 2024-11-04
Payer: MEDICARE

## 2024-11-04 RX ORDER — PREDNISONE 20 MG/1
40 TABLET ORAL DAILY
Qty: 10 TABLET | Refills: 0 | Status: SHIPPED | OUTPATIENT
Start: 2024-11-04 | End: 2024-11-07

## 2024-11-04 NOTE — TELEPHONE ENCOUNTER
Hub staff attempted to follow warm transfer process and was unsuccessful     Caller: Liudmila Ga    Relationship to patient: Self    Best call back number: [phone] (home) [phone]      Patient is needing: PT RETURNED CALL, UNABLE TO WT

## 2024-11-04 NOTE — TELEPHONE ENCOUNTER
So it looks like a Jacqueline Flower at the hub added this patient on for my schedule for Thursday and it just says follow-up acute.  Follow-up acute what?  Can we please call the patient and triage her.

## 2024-11-04 NOTE — TELEPHONE ENCOUNTER
When did your symptoms start? 1-2 weeks ago.       What are your symptoms? SOB, feels like she has to take to take deep breaths and having to use albuterol inhaler more frequently in last week. Having some clear drainage.     Have you been treated recently by another provider for this problem? No     Have you had any recent testing (ex. COVID or x-ray)? Tested for covid on 11/3/24 negative results.     Have you had any exposure to anyone sick? No     Are you taking your medication for your breathing as prescribed? Yes, albuterol inhaler.   asthma/ COPD

## 2024-11-04 NOTE — PROGRESS NOTES
" ALTHEA Fernandez  [facility]   Pulmonary and Critical Care  [address] [place] [address]  Phone: [phone]  Fax: [phone]           Chief Complaint  Asthma-COPD overlap Syndrome    Subjective        Liudmila Juan Ga presents to [facility] PULMONARY & CRITICAL CARE MEDICINE     History of Present Illness  Ms. Ga is a pleasant 73 year old female patient with known asthma/ copd overlap, emphysema, GERD, Depression, arthritis, Hypothyroid, iron deficiency anemia, Raynaud disease, seasonal allergies and vitamin D deficiency.     She experienced a worsening of her shortness of breath 1 to 2 weeks ago, necessitating more frequent use of her rescue inhaler. She also had a cough but reports no sick contacts, fever, chills, or night sweats. A home COVID-19 test was negative. Her symptoms have improved, but she is not yet back to her baseline. She has a mild cough and feels the need to take deep breaths but does not experience gasping for air. Her oxygen saturation levels have generally been above 90%, with a few exceptions for which she notes her pulse ox was giving her an error message.  She has been using her inhaler 2 to 3 times a day, which provides some relief. She has not required antibiotics, steroids, or emergency care in the past year.    She has been exposed to leaves and open doors at her 's office but reports no other triggers. She has postnasal drip and uses Flonase, but no oral antihistamines. She has not tried montelukast. She takes Flonase once a day due to nosebleeds with higher doses. She has tried Trelegy without benefit and disliked the taste.     She has two more doses of prednisone to take.        Objective   Vital Signs:   /78   Pulse 57   Ht 160 cm (63\")   Wt 66.2 kg (146 lb)   SpO2 98% Comment: RA  BMI 25.86 kg/m²     Physical Exam  Vitals reviewed.   Constitutional:       Appearance: Normal appearance. "   Cardiovascular:      Rate and Rhythm: Normal rate and regular rhythm.   Pulmonary:      Effort: Pulmonary effort is normal.      Breath sounds: Normal breath sounds.   Neurological:      General: No focal deficit present.      Mental Status: She is alert and oriented to person, place, and time.   Psychiatric:         Mood and Affect: Mood normal.         Behavior: Behavior normal.            Result Review :  The following data was reviewed by: ALTHEA Fernandez on 11/07/2024:    My interpretation of imaging: No new  My interpretation of labs: No new    PFT Values          7/1/2024    13:30   Pre Drug PFT Results   FVC 60   FEV1 58   FEF 25-75% 54   FEV1/FVC 75   Other Tests PFT Results   DLCO 94   D/VAsb 122     My interpretation of the PFT : No new    Results for orders placed in visit on 07/01/24    Spirometry with Diffusion Capacity    Narrative  Spirometry with Diffusion Capacity    Performed by: Anette Nolan, RRT  Authorized by: Sheree Lopez APRN  Pre Drug % Predicted  FVC: 60%  FEV1: 58%  FEF 25-75%: 54%  FEV1/FVC: 75%  DLCO: 94%  D/VAsb: 122%    Interpretation  Spirometry  Spirometry shows moderate obstruction.  Review of FVL curve  Patient's effort is normal.  Diffusion Capacity  The patient's diffusion capacity is normal.  Diffusion capacity is normal when corrected for alveolar volume.  Overall comments: Compared to prior PFT she has moved from moderate obstruction to moderate restriction on spirometry and diffusion remains normal.      Results for orders placed during the hospital encounter of 04/26/23    Full Pulmonary Function Test With Bronchodilator    Narrative  [facility] - Pulmonary Function Test    [address] [place] [address]  [address]  [phone]    Patient : Liudmila Ga  MRN : [id number]  CSN : [id number]  Pulmonologist : Dany Ferreira MD  Date :  4/26/2023    ______________________________________________________________________    Interpretation :  1.  Spirometry is consistent with a moderate obstructive ventilatory defect.  2.  There is significant improvement in spirometry postbronchodilator but a moderate obstructive ventilatory defect is still present.  3.  Lung volumes reveal a decrease in vital capacity secondary obstruction and hyperinflation is also present.  There is also a decrease in inspiratory capacity.  4.  There is a mild diffusion impairment which when corrected for alveolar volume is normalized.      Dany Ferreira MD        Assessment and Plan   Diagnoses and all orders for this visit:    1. Asthma-COPD overlap syndrome (Primary)  Overview:  Moderate COPD with mild intermittent asthma       2. Stage 2 moderate COPD by GOLD classification    3. Personal history of nicotine dependence  Overview:  Former, quit Jan 2012, 45 pack year history       4. Pulmonary emphysema, unspecified emphysema type      Continue Flonase and refill provided.  Continue as needed albuterol HFA.  Begin Singulair.  Prescription provided.  Side effects discussed with patient and handout provided via [employer].  Begin low-dose Symbicort.  Prescription provided.  Handout provided via [employer].  Patient is advised to rinse out mouth after use.  She is advised to take 2 puffs twice a day.  I have asked her to return in 2 months to follow-up on how she is doing with the Singulair and Symbicort.        Alpha 1: Normal, MM  LDCT: Due 7/2/2025   Smoking Cessation: Former, quit Jan 2012   Vaccinations: Flu: Due fall    Follow Up   No follow-ups on file.  Patient was given instructions and counseling regarding her condition or for health maintenance advice. Please see specific information pulled into the AVS if appropriate.     Sheree Lopez, APRN  11/7/2024  08:26 CST    Please note that portions of this note were completed with a voice recognition  program.    Patient or patient representative verbalized consent for the use of Ambient Listening during the visit with  ALTHEA Fernandez for chart documentation. 11/7/2024  09:17 CST

## 2024-11-04 NOTE — TELEPHONE ENCOUNTER
Tried to call patient regarding upcoming appointment 11/7/24 triage to get more information on visit. Left voicemail to call our office back

## 2024-11-07 ENCOUNTER — OFFICE VISIT (OUTPATIENT)
Dept: PULMONOLOGY | Facility: CLINIC | Age: 74
End: 2024-11-07
Payer: MEDICARE

## 2024-11-07 VITALS
DIASTOLIC BLOOD PRESSURE: 78 MMHG | HEIGHT: 63 IN | BODY MASS INDEX: 25.87 KG/M2 | OXYGEN SATURATION: 98 % | WEIGHT: 146 LBS | SYSTOLIC BLOOD PRESSURE: 122 MMHG | HEART RATE: 57 BPM

## 2024-11-07 DIAGNOSIS — J44.89 ASTHMA-COPD OVERLAP SYNDROME: Primary | Chronic | ICD-10-CM

## 2024-11-07 DIAGNOSIS — Z87.891 PERSONAL HISTORY OF NICOTINE DEPENDENCE: ICD-10-CM

## 2024-11-07 DIAGNOSIS — J43.9 PULMONARY EMPHYSEMA, UNSPECIFIED EMPHYSEMA TYPE: ICD-10-CM

## 2024-11-07 DIAGNOSIS — J44.9 STAGE 2 MODERATE COPD BY GOLD CLASSIFICATION: Chronic | ICD-10-CM

## 2024-11-07 RX ORDER — FLUTICASONE PROPIONATE 50 MCG
2 SPRAY, SUSPENSION (ML) NASAL DAILY
Qty: 16 G | Refills: 3 | Status: SHIPPED | OUTPATIENT
Start: 2024-11-07

## 2024-11-07 RX ORDER — LOSARTAN POTASSIUM 25 MG/1
25 TABLET ORAL DAILY
COMMUNITY

## 2024-11-07 RX ORDER — MONTELUKAST SODIUM 10 MG/1
10 TABLET ORAL NIGHTLY
Qty: 30 TABLET | Refills: 5 | Status: SHIPPED | OUTPATIENT
Start: 2024-11-07

## 2024-11-07 RX ORDER — BUDESONIDE AND FORMOTEROL FUMARATE DIHYDRATE 80; 4.5 UG/1; UG/1
2 AEROSOL RESPIRATORY (INHALATION) 2 TIMES DAILY
Qty: 10.2 G | Refills: 3 | Status: SHIPPED | OUTPATIENT
Start: 2024-11-07

## 2024-11-07 RX ORDER — DEXTROMETHORPHAN POLISTIREX 30 MG/5ML
SUSPENSION ORAL
COMMUNITY

## 2024-12-04 ENCOUNTER — OFFICE VISIT (OUTPATIENT)
Age: 74
End: 2024-12-04

## 2024-12-04 VITALS — WEIGHT: 145 LBS | HEIGHT: 64 IN | BODY MASS INDEX: 24.75 KG/M2

## 2024-12-04 DIAGNOSIS — M17.0 PRIMARY OSTEOARTHRITIS OF BOTH KNEES: Primary | ICD-10-CM

## 2024-12-04 RX ORDER — ZOLPIDEM TARTRATE 10 MG/1
1 TABLET ORAL NIGHTLY PRN
COMMUNITY

## 2024-12-04 RX ORDER — ATENOLOL 25 MG/1
1 TABLET ORAL
COMMUNITY

## 2024-12-04 RX ORDER — METOCLOPRAMIDE 5 MG/1
5 TABLET ORAL 2 TIMES DAILY
COMMUNITY

## 2024-12-04 RX ORDER — MONTELUKAST SODIUM 10 MG/1
10 TABLET ORAL NIGHTLY
COMMUNITY
Start: 2024-11-07

## 2024-12-04 RX ORDER — BISACODYL 5 MG/1
5 TABLET, DELAYED RELEASE ORAL DAILY PRN
COMMUNITY

## 2024-12-04 RX ORDER — FLUTICASONE PROPIONATE 50 MCG
2 SPRAY, SUSPENSION (ML) NASAL DAILY
COMMUNITY
Start: 2024-11-07

## 2024-12-04 RX ORDER — ALENDRONATE SODIUM 70 MG/1
TABLET ORAL
COMMUNITY
Start: 2024-10-07

## 2024-12-04 RX ORDER — ATENOLOL 25 MG/1
25 TABLET ORAL DAILY
COMMUNITY

## 2024-12-04 RX ORDER — GABAPENTIN 100 MG/1
100 CAPSULE ORAL 3 TIMES DAILY
COMMUNITY

## 2024-12-04 RX ORDER — ERYTHROMYCIN 5 MG/G
OINTMENT OPHTHALMIC
COMMUNITY

## 2024-12-04 RX ORDER — BIOTIN 10 MG
TABLET ORAL
COMMUNITY

## 2024-12-04 RX ORDER — ALBUTEROL SULFATE 90 UG/1
INHALANT RESPIRATORY (INHALATION)
COMMUNITY

## 2024-12-04 RX ORDER — GINSENG 100 MG
1 CAPSULE ORAL
COMMUNITY

## 2024-12-04 RX ORDER — ACETAMINOPHEN 160 MG
1 TABLET,DISINTEGRATING ORAL
COMMUNITY

## 2024-12-04 RX ORDER — ASCORBIC ACID 500 MG
1000 TABLET ORAL DAILY
COMMUNITY

## 2024-12-04 RX ORDER — LOSARTAN POTASSIUM 25 MG/1
25 TABLET ORAL DAILY
COMMUNITY

## 2024-12-04 RX ORDER — ALBUTEROL SULFATE 90 UG/1
2 INHALANT RESPIRATORY (INHALATION) EVERY 4 HOURS PRN
COMMUNITY
Start: 2024-07-01

## 2024-12-04 RX ORDER — PANTOPRAZOLE SODIUM 40 MG/1
40 TABLET, DELAYED RELEASE ORAL DAILY
COMMUNITY

## 2024-12-04 RX ORDER — LEVOTHYROXINE SODIUM 50 MCG
TABLET ORAL
COMMUNITY

## 2024-12-04 RX ORDER — AMOXICILLIN 500 MG
1 CAPSULE ORAL
COMMUNITY

## 2024-12-04 RX ORDER — ROSUVASTATIN CALCIUM 5 MG/1
1 TABLET, COATED ORAL
COMMUNITY

## 2024-12-13 DIAGNOSIS — M17.0 PRIMARY OSTEOARTHRITIS OF BOTH KNEES: Primary | ICD-10-CM

## 2025-01-03 ENCOUNTER — TELEPHONE (OUTPATIENT)
Age: 75
End: 2025-01-03

## 2025-01-09 ENCOUNTER — OFFICE VISIT (OUTPATIENT)
Dept: PULMONOLOGY | Facility: CLINIC | Age: 75
End: 2025-01-09
Payer: MEDICARE

## 2025-01-09 VITALS
BODY MASS INDEX: 26.58 KG/M2 | HEART RATE: 62 BPM | WEIGHT: 150 LBS | HEIGHT: 63 IN | SYSTOLIC BLOOD PRESSURE: 122 MMHG | DIASTOLIC BLOOD PRESSURE: 63 MMHG | OXYGEN SATURATION: 97 %

## 2025-01-09 DIAGNOSIS — J44.89 ASTHMA-COPD OVERLAP SYNDROME: Primary | Chronic | ICD-10-CM

## 2025-01-09 DIAGNOSIS — J43.9 PULMONARY EMPHYSEMA, UNSPECIFIED EMPHYSEMA TYPE: ICD-10-CM

## 2025-01-09 DIAGNOSIS — J30.2 SEASONAL ALLERGIES: Chronic | ICD-10-CM

## 2025-01-09 DIAGNOSIS — Z87.891 PERSONAL HISTORY OF NICOTINE DEPENDENCE: ICD-10-CM

## 2025-01-09 PROCEDURE — 3078F DIAST BP <80 MM HG: CPT | Performed by: NURSE PRACTITIONER

## 2025-01-09 PROCEDURE — 99214 OFFICE O/P EST MOD 30 MIN: CPT | Performed by: NURSE PRACTITIONER

## 2025-01-09 PROCEDURE — 1160F RVW MEDS BY RX/DR IN RCRD: CPT | Performed by: NURSE PRACTITIONER

## 2025-01-09 PROCEDURE — 1159F MED LIST DOCD IN RCRD: CPT | Performed by: NURSE PRACTITIONER

## 2025-01-09 PROCEDURE — 3074F SYST BP LT 130 MM HG: CPT | Performed by: NURSE PRACTITIONER

## 2025-01-09 RX ORDER — ACETAMINOPHEN 160 MG
2000 TABLET,DISINTEGRATING ORAL
COMMUNITY

## 2025-01-09 RX ORDER — PREDNISONE 20 MG/1
40 TABLET ORAL DAILY
Qty: 10 TABLET | Refills: 0 | Status: SHIPPED | OUTPATIENT
Start: 2025-01-09 | End: 2025-01-14

## 2025-01-09 NOTE — PROGRESS NOTES
" ALTHEA Fernandez  [facility]   Pulmonary and Critical Care  [address] [place] [address]  Phone: [phone]  Fax: [phone]           Chief Complaint  Asthma-COPD overlap syndrome    Subjective         Liudmila Juan Ga presents to [facility] PULMONARY & CRITICAL CARE MEDICINE     History of Present Illness  Ms. Ga is a pleasant 74 year old female patient with known asthma/ copd overlap, emphysema, GERD, Depression, arthritis, Hypothyroid, iron deficiency anemia, Raynaud disease, seasonal allergies and vitamin D deficiency.     She reports a persistent cough, which she does not consider severe, accompanied by postnasal drainage and nasal congestion. She has been experiencing these symptoms for the past week.  She did not seek medical attention initially, opting for over-the-counter remedies instead. She has not conducted a home COVID-19 test. Her condition has improved compared to the initial days of her illness when she was largely bedridden, only getting up to use the bathroom. She has been using generic NyQuil, which she finds helpful in alleviating her congestion. She has been using Flonase as part of her treatment regimen.    She has been on Breyana, administered as 2 puffs twice daily, but often forgets the second dose. She has dental issues, including receding gums and cold sensitivity, which make it difficult for her to rinse her mouth as well as concern about rinsing off the toothpaste. She has been experiencing a sore throat, which has not shown signs of improvement. She has started taking Singulair but has not noticed any significant changes.        Objective   Vital Signs:   /63   Pulse 62   Ht 160 cm (63\")   Wt 68 kg (150 lb)   SpO2 97% Comment: RA  BMI 26.57 kg/m²     Physical Exam  Vitals reviewed.   Constitutional:       Appearance: Normal appearance.   Cardiovascular:      Rate and Rhythm: Normal rate and regular rhythm. "   Pulmonary:      Effort: Pulmonary effort is normal.      Breath sounds: Normal breath sounds.   Neurological:      General: No focal deficit present.      Mental Status: She is alert and oriented to person, place, and time.   Psychiatric:         Mood and Affect: Mood normal.         Behavior: Behavior normal.            Result Review :  The following data was reviewed by: ALTHEA Fernandez on 01/09/2025:    My interpretation of imaging: No new  My interpretation of labs: New    PFT Values          7/1/2024    13:30   Pre Drug PFT Results   FVC 60   FEV1 58   FEF 25-75% 54   FEV1/FVC 75   Other Tests PFT Results   DLCO 94   D/VAsb 122     My interpretation of the PFT : No new    Results for orders placed in visit on 07/01/24    Spirometry with Diffusion Capacity    Narrative  Spirometry with Diffusion Capacity    Performed by: Anette Nolan, RRT  Authorized by: Sheree Lopez APRN  Pre Drug % Predicted  FVC: 60%  FEV1: 58%  FEF 25-75%: 54%  FEV1/FVC: 75%  DLCO: 94%  D/VAsb: 122%    Interpretation  Spirometry  Spirometry shows moderate obstruction.  Review of FVL curve  Patient's effort is normal.  Diffusion Capacity  The patient's diffusion capacity is normal.  Diffusion capacity is normal when corrected for alveolar volume.  Overall comments: Compared to prior PFT she has moved from moderate obstruction to moderate restriction on spirometry and diffusion remains normal.      Results for orders placed during the hospital encounter of 04/26/23    Full Pulmonary Function Test With Bronchodilator    Narrative  [facility] - Pulmonary Function Test    [address] [place] [address]  [address]  [phone]    Patient : Liudmila Ga  MRN : [id number]  CSN : [id number]  Pulmonologist : Dany Ferreira MD  Date : 4/26/2023    ______________________________________________________________________    Interpretation :  1.  Spirometry is consistent with a moderate obstructive  ventilatory defect.  2.  There is significant improvement in spirometry postbronchodilator but a moderate obstructive ventilatory defect is still present.  3.  Lung volumes reveal a decrease in vital capacity secondary obstruction and hyperinflation is also present.  There is also a decrease in inspiratory capacity.  4.  There is a mild diffusion impairment which when corrected for alveolar volume is normalized.      Dany Ferreira MD          Assessment and Plan   Diagnoses and all orders for this visit:    1. Asthma-COPD overlap syndrome (Primary)  Overview:  Moderate COPD with mild intermittent asthma     Assessment & Plan:  Continue Singulair, Symbicort, as needed albuterol HFA            2. Personal history of nicotine dependence  Overview:  Former, quit Jan 2012, 45 pack year history     Assessment & Plan:  Low-dose CT due in July 2025      3. Pulmonary emphysema, unspecified emphysema type  Comments:  Continue bronchodilators    4. Seasonal allergies  Assessment & Plan:  Continue Singulair  Continue Flonase        At last visit in November she was having exacerbations and was started on Breyana however she tends to forget to take it twice daily.  She also struggles with her mouth sensitivity to get her mouth rinsed well.  She is currently been sick for the last week taking over-the-counter NyQuil.  She is encouraged to continue the the maintenance inhaler at this time and do her best and rinsing her mouth out.  She will continue Singulair and Flonase.  She will continue her over-the-counter nasal congestion medications.  She does report today that she is slowly starting to feel better.  I will go ahead and give her a short course of prednisone.  She is advised that if she gets any worse then I would recommend she present to urgent care or the ER depending on severity of her symptoms.  COVID, flu, RSV, rhinovirus and human metapneumovirus have all been prevalent at this time.  She likely has a viral  infection.  If she develops any signs or symptoms of a bacterial infection we can add in an antibiotic.      Alpha 1: Normal, MM  LDCT: Due 7/2/2025   Smoking Cessation: Former, quit Jan 2012   Vaccinations: Flu: Due fall PNA:-Completed    Follow Up   No follow-ups on file.  Patient was given instructions and counseling regarding her condition or for health maintenance advice. Please see specific information pulled into the AVS if appropriate.     ALTHEA Fernandez  1/9/2025  10:39 CST    Please note that portions of this note were completed with a voice recognition program.    Patient or patient representative verbalized consent for the use of Ambient Listening during the visit with  ALTHEA Fernandez for chart documentation. 1/9/2025  11:03 CST

## 2025-01-24 ENCOUNTER — TELEPHONE (OUTPATIENT)
Age: 75
End: 2025-01-24

## 2025-01-24 NOTE — TELEPHONE ENCOUNTER
Attempted to reach patient to set up appointment to get precerted Gel Injections. Left a voicemail to return my call. Patient needs to be setup on [facility], if he returns the call.

## 2025-02-28 ENCOUNTER — TELEPHONE (OUTPATIENT)
Dept: PULMONOLOGY | Facility: CLINIC | Age: 75
End: 2025-02-28
Payer: MEDICARE

## 2025-02-28 NOTE — TELEPHONE ENCOUNTER
Patient will contact her insurance company to see which inhalers would be more affordable.   [employer] message sent to patient with similar inhalers.

## 2025-02-28 NOTE — TELEPHONE ENCOUNTER
Caller: Liudmila Ga    Relationship to patient: Self    Best call back number:     [phone] (Mobile)       Patient is needing: PT SAID HER SYMBICORT RX HAS GOTTEN VERY EXPENSIVE, WANTS TO KNOW IF WE HAVE ANY ALTERNATIVES   [place], [place] - [address] [facility] - [phone]  - [phone]  [phone]

## 2025-03-04 DIAGNOSIS — J44.89 ASTHMA-COPD OVERLAP SYNDROME: Primary | ICD-10-CM

## 2025-03-04 RX ORDER — FLUTICASONE PROPIONATE AND SALMETEROL 250; 50 UG/1; UG/1
1 POWDER RESPIRATORY (INHALATION)
Qty: 180 EACH | Refills: 3 | Status: SHIPPED | OUTPATIENT
Start: 2025-03-04

## 2025-03-04 NOTE — TELEPHONE ENCOUNTER
Caller: Liudmila Ga    Relationship: Self    Best call back number: [phone]     Requested Prescriptions:   [facility]    [facility] [place] [facility] [address] [facility] [phone] [facility] [phone] FX     Last office visit with prescribing clinician: 1/9/2025   Next office visit with prescribing clinician: 7/2/2025     Additional details provided by patient: PATIENT CONFIRMED WITH HER INSURANCE THIS ALTERNATIVE MED WILL BE COVERED.     Does the patient have less than a 3 day supply:  [x] Yes  [] No    Would you like a call back once the refill request has been completed: [] Yes [x] No    If the office needs to give you a call back, can they leave a voicemail: [] Yes [x] No
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
## 2025-04-17 ENCOUNTER — OFFICE VISIT (OUTPATIENT)
Age: 75
End: 2025-04-17
Payer: MEDICARE

## 2025-04-17 VITALS — BODY MASS INDEX: 24.75 KG/M2 | WEIGHT: 145 LBS | HEIGHT: 64 IN

## 2025-04-17 DIAGNOSIS — M19.012 PRIMARY OSTEOARTHRITIS OF LEFT SHOULDER: ICD-10-CM

## 2025-04-17 DIAGNOSIS — M19.019 AC JOINT ARTHROPATHY: ICD-10-CM

## 2025-04-17 DIAGNOSIS — M25.512 CHRONIC LEFT SHOULDER PAIN: Primary | ICD-10-CM

## 2025-04-17 DIAGNOSIS — G89.29 CHRONIC LEFT SHOULDER PAIN: Primary | ICD-10-CM

## 2025-04-17 DIAGNOSIS — M67.912 TENDINOPATHY OF LEFT ROTATOR CUFF: ICD-10-CM

## 2025-04-17 DIAGNOSIS — M25.812 SHOULDER IMPINGEMENT, LEFT: ICD-10-CM

## 2025-04-17 PROCEDURE — 99203 OFFICE O/P NEW LOW 30 MIN: CPT

## 2025-04-17 PROCEDURE — 1159F MED LIST DOCD IN RCRD: CPT

## 2025-04-17 PROCEDURE — 1123F ACP DISCUSS/DSCN MKR DOCD: CPT

## 2025-04-17 RX ORDER — FLUTICASONE PROPIONATE AND SALMETEROL 250; 50 UG/1; UG/1
POWDER RESPIRATORY (INHALATION)
COMMUNITY

## 2025-04-24 ENCOUNTER — HOSPITAL ENCOUNTER (OUTPATIENT)
Dept: MRI IMAGING | Age: 75
Discharge: HOME | End: 2025-04-24
Payer: MEDICARE

## 2025-04-24 DIAGNOSIS — M19.019 AC JOINT ARTHROPATHY: ICD-10-CM

## 2025-04-24 DIAGNOSIS — M25.512 CHRONIC LEFT SHOULDER PAIN: ICD-10-CM

## 2025-04-24 DIAGNOSIS — G89.29 CHRONIC LEFT SHOULDER PAIN: ICD-10-CM

## 2025-04-24 DIAGNOSIS — M25.812 SHOULDER IMPINGEMENT, LEFT: ICD-10-CM

## 2025-04-24 DIAGNOSIS — M19.012 PRIMARY OSTEOARTHRITIS OF LEFT SHOULDER: ICD-10-CM

## 2025-04-24 DIAGNOSIS — M67.912 TENDINOPATHY OF LEFT ROTATOR CUFF: ICD-10-CM

## 2025-04-24 PROCEDURE — 73221 MRI JOINT UPR EXTREM W/O DYE: CPT

## 2025-05-15 DIAGNOSIS — J30.2 SEASONAL ALLERGIES: Primary | ICD-10-CM

## 2025-05-15 RX ORDER — MONTELUKAST SODIUM 10 MG/1
10 TABLET ORAL NIGHTLY
Qty: 30 TABLET | Refills: 5 | Status: SHIPPED | OUTPATIENT
Start: 2025-05-15

## 2025-05-15 NOTE — TELEPHONE ENCOUNTER
Rx Refill Note  Requested Prescriptions     Pending Prescriptions Disp Refills    montelukast (SINGULAIR) 10 MG tablet [Pharmacy Med Name: MONTELUKAST SODIUM 10MG TABLET] 30 tablet 5     Sig: TAKE 1 TABLET BY MOUTH EVERY NIGHT.      Last office visit with prescribing clinician: 1/9/2025   Last telemedicine visit with prescribing clinician: Visit date not found   Next office visit with prescribing clinician: 7/2/2025                         Would you like a call back once the refill request has been completed: [] Yes [] No    If the office needs to give you a call back, can they leave a voicemail: [] Yes [] No    Anette Hanson MA  05/15/25, 14:39 CDT

## 2025-05-16 ENCOUNTER — OFFICE VISIT (OUTPATIENT)
Age: 75
End: 2025-05-16
Payer: MEDICARE

## 2025-05-16 VITALS — WEIGHT: 145 LBS | HEIGHT: 64 IN | BODY MASS INDEX: 24.75 KG/M2

## 2025-05-16 DIAGNOSIS — M54.12 CERVICAL RADICULOPATHY: ICD-10-CM

## 2025-05-16 DIAGNOSIS — M19.012 PRIMARY OSTEOARTHRITIS OF LEFT SHOULDER: ICD-10-CM

## 2025-05-16 DIAGNOSIS — M25.512 CHRONIC LEFT SHOULDER PAIN: Primary | ICD-10-CM

## 2025-05-16 DIAGNOSIS — M67.922 TENDINOPATHY OF LEFT BICEPS TENDON: ICD-10-CM

## 2025-05-16 DIAGNOSIS — G89.29 CHRONIC LEFT SHOULDER PAIN: Primary | ICD-10-CM

## 2025-05-16 DIAGNOSIS — M19.019 AC JOINT ARTHROPATHY: ICD-10-CM

## 2025-05-16 DIAGNOSIS — M67.912 TENDINOPATHY OF LEFT ROTATOR CUFF: ICD-10-CM

## 2025-05-16 DIAGNOSIS — M54.2 CERVICALGIA: ICD-10-CM

## 2025-05-16 PROCEDURE — 99214 OFFICE O/P EST MOD 30 MIN: CPT

## 2025-05-16 PROCEDURE — 1123F ACP DISCUSS/DSCN MKR DOCD: CPT

## 2025-05-16 PROCEDURE — 1159F MED LIST DOCD IN RCRD: CPT

## 2025-05-16 RX ORDER — CEFDINIR 300 MG/1
CAPSULE ORAL
COMMUNITY
Start: 2025-02-11
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
## 2025-06-02 DIAGNOSIS — Z87.891 PERSONAL HISTORY OF NICOTINE DEPENDENCE: Primary | ICD-10-CM

## 2025-06-02 DIAGNOSIS — J43.9 PULMONARY EMPHYSEMA, UNSPECIFIED EMPHYSEMA TYPE: ICD-10-CM

## 2025-06-09 ENCOUNTER — TELEPHONE (OUTPATIENT)
Age: 75
End: 2025-06-09

## 2025-06-09 NOTE — TELEPHONE ENCOUNTER
Pt states no one ever reached out to her about scheduling the NCS on [address].   Pt cx'd upcoming appt for NCS Results  Please call pt back regarding this

## 2025-07-07 ENCOUNTER — TELEPHONE (OUTPATIENT)
Dept: PULMONOLOGY | Facility: CLINIC | Age: 75
End: 2025-07-07
Payer: MEDICARE

## 2025-07-07 NOTE — TELEPHONE ENCOUNTER
Called and reminded patient of her CT, PFT, and follow up Wednesday with Lou. Patient voiced understanding.

## 2025-07-08 NOTE — PROGRESS NOTES
" ALTHEA Fernandez  [facility]   Pulmonary and Critical Care  [address] [place] [address]  Phone: [phone]  Fax: [phone]           Chief Complaint  Asthma-COPD overlap syndrome    Subjective        Liudmila Juan Ga presents to [facility] PULMONARY & CRITICAL CARE MEDICINE     History of Present Illness  Ms. Ga is a pleasant 74 year old female patient with known asthma/ copd overlap, emphysema, GERD, Depression, arthritis, Hypothyroid, iron deficiency anemia, Raynaud disease, seasonal allergies and vitamin D deficiency.     She has been using a Breo inhaler but is uncertain about its effectiveness. She has not needed to use her rescue inhaler recently. She continues to use Flonase nasal spray and recently had it refilled. She recalls an episode during her COVID-19 infection where she experienced shortness of breath while showering at her daughter's house, a symptom she had not encountered before or since. She thought she had pneumonia at that time.      Objective   Vital Signs:   /68   Pulse 60   Ht 158.8 cm (62.5\")   Wt 65.8 kg (145 lb)   SpO2 97% Comment: RA  BMI 26.10 kg/m²     Physical Exam  Vitals reviewed.   Constitutional:       Appearance: Normal appearance.   Cardiovascular:      Rate and Rhythm: Normal rate and regular rhythm.   Pulmonary:      Effort: Pulmonary effort is normal.      Breath sounds: Normal breath sounds.   Neurological:      General: No focal deficit present.      Mental Status: She is alert and oriented to person, place, and time.   Psychiatric:         Mood and Affect: Mood normal.         Behavior: Behavior normal.          Result Review :  The following data was reviewed by: ALTHEA Fernandez on 07/09/2025:    Data reviewed : Radiologic studies CT of the chest    My interpretation of imaging: Mild centrilobular emphysema, no suspicious nodules, linear parenchymal scarring.  Numerous " calcifications.  My interpretation of labs: no new   CT Chest Low Dose Cancer Screening WO (07/09/2025 12:25)     PFT Values          7/1/2024    13:30 7/9/2025    13:15   Pre Drug PFT Results   FVC 60 68   FEV1 58 63   FEF 25-75% 54 48   FEV1/FVC 75 71   Other Tests PFT Results   DLCO 94 98   D/VAsb 122 119     My interpretation of the PFT : As below    Results for orders placed in visit on 07/09/25    Spirometry with Diffusion Capacity    Narrative  Spirometry with Diffusion Capacity    Performed by: Anette Nolan, RRT  Authorized by: Sheree Lopez APRN  Pre Drug % Predicted  FVC: 68%  FEV1: 63%  FEF 25-75%: 48%  FEV1/FVC: 71%  DLCO: 98%  D/VAsb: 119%    Interpretation  Spirometry  Spirometry shows moderate restriction.  Review of FVL curve  Patient's effort is normal.  Diffusion Capacity  The patient's diffusion capacity is normal.  Diffusion capacity is normal when corrected for alveolar volume.      Results for orders placed in visit on 07/01/24    Spirometry with Diffusion Capacity    Narrative  Spirometry with Diffusion Capacity    Performed by: Anette Nolan, RRT  Authorized by: Sheree Lopez APRN  Pre Drug % Predicted  FVC: 60%  FEV1: 58%  FEF 25-75%: 54%  FEV1/FVC: 75%  DLCO: 94%  D/VAsb: 122%    Interpretation  Spirometry  Spirometry shows moderate obstruction.  Review of FVL curve  Patient's effort is normal.  Diffusion Capacity  The patient's diffusion capacity is normal.  Diffusion capacity is normal when corrected for alveolar volume.  Overall comments: Compared to prior PFT she has moved from moderate obstruction to moderate restriction on spirometry and diffusion remains normal.      Results for orders placed during the hospital encounter of 04/26/23    Full Pulmonary Function Test With Bronchodilator    Narrative  [facility] - Pulmonary Function Test    [address] [place] [address]  [id number]  [phone]    Patient : Liudmila Ga  MRN :  [id number]  [facility] : [id number]  Pulmonologist : Dany Ferreira MD  Date : 4/26/2023    ______________________________________________________________________    Interpretation :  1.  Spirometry is consistent with a moderate obstructive ventilatory defect.  2.  There is significant improvement in spirometry postbronchodilator but a moderate obstructive ventilatory defect is still present.  3.  Lung volumes reveal a decrease in vital capacity secondary obstruction and hyperinflation is also present.  There is also a decrease in inspiratory capacity.  4.  There is a mild diffusion impairment which when corrected for alveolar volume is normalized.      Dany Ferreira MD          Assessment and Plan   Diagnoses and all orders for this visit:    1. Asthma-COPD overlap syndrome (Primary)  Overview:  Moderate COPD with mild intermittent asthma     Orders:  -     Spirometry with Diffusion Capacity    2. Stage 2 moderate COPD by GOLD classification    3. Pulmonary emphysema, unspecified emphysema type    4. Seasonal allergies    5. Personal history of nicotine dependence  Overview:  Former, quit Jan 2012, 45 pack year history           Scan reviewed with the patient and stable with no suspicious nodules and mild emphysematous changes.  Pulmonary function study showed moderate restriction and stable.  Normal diffusion capacity.  New Flonase, [place] and as needed albuterol.  Continue Singulair.  In 1 year with a breathing study and a CT scan.      Liudmila Ga  reports that she quit smoking about 13 years ago. Her smoking use included cigarettes. She started smoking about 59 years ago. She has a 46 pack-year smoking history. She has been exposed to tobacco smoke. She has never used smokeless tobacco.       Alpha 1: Normal, MM  LDCT: Due July 2026  Smoking Cessation: Former, quit Jan 2012   Vaccinations: Flu: Due fall PNA:-Completed    Follow Up   Return for CT, FVL w DIF.  Patient was given instructions and  counseling regarding her condition or for health maintenance advice. Please see specific information pulled into the AVS if appropriate.     ALTHEA Fernandez  7/11/2025  18:11 CDT    Please note that portions of this note were completed with a voice recognition program.    Patient or patient representative verbalized consent for the use of Ambient Listening during the visit with  ALTHEA Fernandez for chart documentation. 7/11/2025  18:14 CDT

## 2025-07-09 ENCOUNTER — HOSPITAL ENCOUNTER (OUTPATIENT)
Dept: CT IMAGING | Facility: HOSPITAL | Age: 75
Discharge: HOME | End: 2025-07-09
Admitting: NURSE PRACTITIONER
Payer: MEDICARE

## 2025-07-09 ENCOUNTER — OFFICE VISIT (OUTPATIENT)
Dept: PULMONOLOGY | Facility: CLINIC | Age: 75
End: 2025-07-09
Payer: MEDICARE

## 2025-07-09 ENCOUNTER — PROCEDURE VISIT (OUTPATIENT)
Dept: PULMONOLOGY | Facility: CLINIC | Age: 75
End: 2025-07-09
Payer: MEDICARE

## 2025-07-09 VITALS
HEART RATE: 60 BPM | WEIGHT: 145 LBS | DIASTOLIC BLOOD PRESSURE: 68 MMHG | HEIGHT: 63 IN | SYSTOLIC BLOOD PRESSURE: 110 MMHG | OXYGEN SATURATION: 97 % | BODY MASS INDEX: 25.69 KG/M2

## 2025-07-09 DIAGNOSIS — J44.9 STAGE 2 MODERATE COPD BY GOLD CLASSIFICATION: Primary | ICD-10-CM

## 2025-07-09 DIAGNOSIS — J44.9 STAGE 2 MODERATE COPD BY GOLD CLASSIFICATION: Chronic | ICD-10-CM

## 2025-07-09 DIAGNOSIS — J43.9 PULMONARY EMPHYSEMA, UNSPECIFIED EMPHYSEMA TYPE: ICD-10-CM

## 2025-07-09 DIAGNOSIS — J44.89 ASTHMA-COPD OVERLAP SYNDROME: Primary | Chronic | ICD-10-CM

## 2025-07-09 DIAGNOSIS — Z87.891 PERSONAL HISTORY OF NICOTINE DEPENDENCE: ICD-10-CM

## 2025-07-09 DIAGNOSIS — J30.2 SEASONAL ALLERGIES: Chronic | ICD-10-CM

## 2025-07-09 PROCEDURE — 71271 CT THORAX LUNG CANCER SCR C-: CPT

## 2025-07-09 RX ORDER — PREDNISONE 20 MG/1
20 TABLET ORAL DAILY
COMMUNITY
End: 2025-07-09

## 2025-07-09 RX ORDER — HYDROMORPHONE HYDROCHLORIDE 2 MG/1
2 TABLET ORAL EVERY 6 HOURS PRN
COMMUNITY

## 2025-07-09 RX ORDER — TRAMADOL HYDROCHLORIDE 50 MG/1
50 TABLET ORAL EVERY 12 HOURS PRN
COMMUNITY
End: 2025-07-09 | Stop reason: ALTCHOICE

## 2025-07-09 RX ORDER — BETAMETHASONE SODIUM PHOSPHATE AND BETAMETHASONE ACETATE 3; 3 MG/ML; MG/ML
6 INJECTION, SUSPENSION INTRA-ARTICULAR; INTRALESIONAL; INTRAMUSCULAR; SOFT TISSUE ONCE
COMMUNITY
Start: 2025-06-05 | End: 2025-07-09

## 2025-07-09 NOTE — PROCEDURES
Spirometry with Diffusion Capacity    Performed by: Anette Nolan, RRT  Authorized by: Sheree Lopez APRN     Pre Drug % Predicted    FVC: 68%   FEV1: 63%   FEF 25-75%: 48%   FEV1/FVC: 71%   DLCO: 98%   D/VAsb: 119%    Interpretation   Spirometry   Spirometry shows moderate restriction.   Review of FVL curve   Patient's effort is normal.   Diffusion Capacity  The patient's diffusion capacity is normal.  Diffusion capacity is normal when corrected for alveolar volume.

## 2025-08-18 ENCOUNTER — TRANSCRIBE ORDERS (OUTPATIENT)
Dept: ADMINISTRATIVE | Facility: HOSPITAL | Age: 75
End: 2025-08-18
Payer: MEDICARE

## 2025-08-18 DIAGNOSIS — N18.31 STAGE 3A CHRONIC KIDNEY DISEASE: Primary | ICD-10-CM

## 2025-08-25 ENCOUNTER — HOSPITAL ENCOUNTER (OUTPATIENT)
Dept: ULTRASOUND IMAGING | Facility: HOSPITAL | Age: 75
Discharge: HOME | End: 2025-08-25
Admitting: NURSE PRACTITIONER
Payer: MEDICARE

## 2025-08-25 DIAGNOSIS — N18.31 STAGE 3A CHRONIC KIDNEY DISEASE: ICD-10-CM

## 2025-08-25 PROCEDURE — 76775 US EXAM ABDO BACK WALL LIM: CPT

## (undated) DEVICE — SENSR O2 OXIMAX FNGR A/ 18IN NONSTR

## (undated) DEVICE — Device: Brand: DEFENDO AIR/WATER/SUCTION AND BIOPSY VALVE

## (undated) DEVICE — SNAR POLYP SENSATION MICRO OVL 13 240X40

## (undated) DEVICE — CONMED SCOPE SAVER BITE BLOCK, 20X27 MM: Brand: SCOPE SAVER

## (undated) DEVICE — DEV INFL ALLIANCE2 SYS

## (undated) DEVICE — SOL IRR NACL 0.9PCT BT 1000ML

## (undated) DEVICE — YANKAUER,BULB TIP WITH VENT: Brand: ARGYLE

## (undated) DEVICE — MASK,OXYGEN,MED CONC,ADLT,7' TUB, UC: Brand: PENDING

## (undated) DEVICE — MODEL AT P65, P/N 701554-001KIT CONTENTS: HAND CONTROLLER, 3-WAY HIGH-PRESSURE STOPCOCK WITH ROTATING END AND PREMIUM HIGH-PRESSURE TUBING: Brand: ANGIOTOUCH® KIT

## (undated) DEVICE — THE CHANNEL CLEANING BRUSH IS A NYLON FLEXI BRUSH ATTACHED TO A FLEXIBLE PLASTIC SHEATH DESIGNED TO SAFELY REMOVE DEBRIS FROM FLEXIBLE ENDOSCOPES.

## (undated) DEVICE — PK CATH CARD 30 CA/4

## (undated) DEVICE — RADIFOCUS OPTITORQUE ANGIOGRAPHIC CATHETER: Brand: OPTITORQUE

## (undated) DEVICE — CUFF,BP,DISP,1 TUBE,ADULT,HP: Brand: MEDLINE

## (undated) DEVICE — DRAPE,ANGIO,BRACH,STERILE,38X44: Brand: MEDLINE

## (undated) DEVICE — FRCP BIOP COLD ENDOJAW ALLGTR W/NDL 2.8X2300MM BLU

## (undated) DEVICE — TBG SMPL FLTR LINE NASL 02/C02 A/ BX/100

## (undated) DEVICE — ENDOGATOR AUXILIARY WATER JET CONNECTOR: Brand: ENDOGATOR

## (undated) DEVICE — THE SINGLE USE ETRAP – POLYP TRAP IS USED FOR SUCTION RETRIEVAL OF ENDOSCOPICALLY REMOVED POLYPS.: Brand: ETRAP

## (undated) DEVICE — PAD, DEFIB, ADULT, RADIOTRANS, PHYSIO: Brand: MEDLINE

## (undated) DEVICE — CATH DIAG IMPULSE PIG145 5F 110CM

## (undated) DEVICE — GLIDESHEATH SLENDER STAINLESS STEEL KIT: Brand: GLIDESHEATH SLENDER

## (undated) DEVICE — MODEL BT2000 P/N 700287-012KIT CONTENTS: MANIFOLD WITH SALINE AND CONTRAST PORTS, SALINE TUBING WITH SPIKE AND HAND SYRINGE, TRANSDUCER: Brand: BT2000 AUTOMATED MANIFOLD KIT

## (undated) DEVICE — SNAR POLYP CAPTIVATOR RND STFF 2.4 240CM 10MM 1P/U

## (undated) DEVICE — FRCP BX RADJAW4 NDL 2.8 240 STD OG

## (undated) DEVICE — SUP ARMBRD ART/LINE BLU

## (undated) DEVICE — SOLIDIFIER LIQUI LOC PLUS 2000CC

## (undated) DEVICE — GW STARTER FXD CORE J .035 3X260CM 3MM

## (undated) DEVICE — TR BAND RADIAL ARTERY COMPRESSION DEVICE: Brand: TR BAND

## (undated) DEVICE — ESOPHAGEAL BALLOON DILATATION CATHETER: Brand: CRE FIXED WIRE

## (undated) DEVICE — CANN NASL ETCO2 LO/FLO A/

## (undated) DEVICE — DRSNG SURESITE WNDW 4X4.5

## (undated) DEVICE — MSK O2 MD CONCENTR A/ LF 7FT 1P/U